# Patient Record
Sex: FEMALE | Race: WHITE | Employment: FULL TIME | URBAN - METROPOLITAN AREA
[De-identification: names, ages, dates, MRNs, and addresses within clinical notes are randomized per-mention and may not be internally consistent; named-entity substitution may affect disease eponyms.]

---

## 2017-02-07 ENCOUNTER — OFFICE VISIT (OUTPATIENT)
Dept: INTERNAL MEDICINE CLINIC | Age: 47
End: 2017-02-07

## 2017-02-07 VITALS
OXYGEN SATURATION: 98 % | DIASTOLIC BLOOD PRESSURE: 90 MMHG | SYSTOLIC BLOOD PRESSURE: 124 MMHG | RESPIRATION RATE: 16 BRPM | HEIGHT: 70 IN | HEART RATE: 76 BPM | WEIGHT: 252.6 LBS | BODY MASS INDEX: 36.16 KG/M2 | TEMPERATURE: 99.4 F

## 2017-02-07 DIAGNOSIS — J01.11 ACUTE RECURRENT FRONTAL SINUSITIS: Primary | ICD-10-CM

## 2017-02-07 PROCEDURE — 99214 OFFICE O/P EST MOD 30 MIN: CPT | Performed by: NURSE PRACTITIONER

## 2017-02-07 RX ORDER — AMOXICILLIN AND CLAVULANATE POTASSIUM 875; 125 MG/1; MG/1
1 TABLET, FILM COATED ORAL 2 TIMES DAILY
Qty: 20 TABLET | Refills: 0 | Status: SHIPPED | OUTPATIENT
Start: 2017-02-07 | End: 2017-02-17

## 2017-02-07 ASSESSMENT — ENCOUNTER SYMPTOMS
SHORTNESS OF BREATH: 0
COUGH: 1
SORE THROAT: 0
SINUS PRESSURE: 1

## 2017-02-10 ENCOUNTER — TELEPHONE (OUTPATIENT)
Dept: INTERNAL MEDICINE CLINIC | Age: 47
End: 2017-02-10

## 2017-02-10 RX ORDER — AMOXICILLIN AND CLAVULANATE POTASSIUM 875; 125 MG/1; MG/1
1 TABLET, FILM COATED ORAL 2 TIMES DAILY
Qty: 20 TABLET | Refills: 0 | Status: CANCELLED | OUTPATIENT
Start: 2017-02-10 | End: 2017-02-20

## 2017-02-10 RX ORDER — BENZONATATE 100 MG/1
100 CAPSULE ORAL 3 TIMES DAILY PRN
Qty: 30 CAPSULE | Refills: 1 | Status: SHIPPED | OUTPATIENT
Start: 2017-02-10 | End: 2017-02-17

## 2017-09-19 ENCOUNTER — OFFICE VISIT (OUTPATIENT)
Dept: INTERNAL MEDICINE CLINIC | Age: 47
End: 2017-09-19

## 2017-09-19 VITALS
HEIGHT: 70 IN | OXYGEN SATURATION: 97 % | TEMPERATURE: 99.5 F | WEIGHT: 249.2 LBS | SYSTOLIC BLOOD PRESSURE: 112 MMHG | HEART RATE: 86 BPM | DIASTOLIC BLOOD PRESSURE: 82 MMHG | BODY MASS INDEX: 35.68 KG/M2

## 2017-09-19 DIAGNOSIS — M79.604 RIGHT LEG PAIN: Primary | ICD-10-CM

## 2017-09-19 LAB — D DIMER: 265 NG/ML DDU (ref 0–229)

## 2017-09-19 PROCEDURE — 99213 OFFICE O/P EST LOW 20 MIN: CPT | Performed by: INTERNAL MEDICINE

## 2017-09-20 ENCOUNTER — HOSPITAL ENCOUNTER (OUTPATIENT)
Dept: VASCULAR LAB | Age: 47
Discharge: HOME | End: 2017-09-20
Attending: INTERNAL MEDICINE | Admitting: INTERNAL MEDICINE

## 2017-09-20 DIAGNOSIS — M79.604 PAIN OF RIGHT LEG: ICD-10-CM

## 2017-09-22 ENCOUNTER — TELEPHONE (OUTPATIENT)
Dept: INTERNAL MEDICINE CLINIC | Age: 47
End: 2017-09-22

## 2017-12-18 ENCOUNTER — OFFICE VISIT (OUTPATIENT)
Dept: INTERNAL MEDICINE CLINIC | Age: 47
End: 2017-12-18

## 2017-12-18 VITALS
BODY MASS INDEX: 36.22 KG/M2 | DIASTOLIC BLOOD PRESSURE: 82 MMHG | OXYGEN SATURATION: 98 % | SYSTOLIC BLOOD PRESSURE: 132 MMHG | HEIGHT: 70 IN | WEIGHT: 253 LBS | TEMPERATURE: 98 F | HEART RATE: 89 BPM

## 2017-12-18 DIAGNOSIS — M17.11 ARTHRITIS OF KNEE, RIGHT: ICD-10-CM

## 2017-12-18 DIAGNOSIS — Z00.00 ANNUAL PHYSICAL EXAM: Primary | ICD-10-CM

## 2017-12-18 DIAGNOSIS — Z23 NEED FOR PNEUMOCOCCAL VACCINATION: ICD-10-CM

## 2017-12-18 DIAGNOSIS — M17.12 ARTHRITIS OF KNEE, LEFT: ICD-10-CM

## 2017-12-18 PROCEDURE — 90732 PPSV23 VACC 2 YRS+ SUBQ/IM: CPT | Performed by: INTERNAL MEDICINE

## 2017-12-18 PROCEDURE — 99396 PREV VISIT EST AGE 40-64: CPT | Performed by: INTERNAL MEDICINE

## 2017-12-18 PROCEDURE — 90471 IMMUNIZATION ADMIN: CPT | Performed by: INTERNAL MEDICINE

## 2017-12-18 NOTE — PROGRESS NOTES
Subjective:      Patient ID: Suzanna Tafoya is a 50 y.o. female. HPIwith a history of OA, HTN and breast ca who is here for an annual visit. She walks a few times per week. She also has to get up and down 4 flights of steps several x per day. Has gained a little. No Known Allergies  Current Outpatient Prescriptions   Medication Sig Dispense Refill    lisinopril-hydrochlorothiazide (PRINZIDE;ZESTORETIC) 10-12.5 MG per tablet TAKE 1 TABLET BY MOUTH DAILY 90 tablet 0    atorvastatin (LIPITOR) 20 MG tablet Take 1 tablet by mouth daily Take at bedtime 30 tablet 3    levonorgestrel (MIRENA) 20 MCG/24HR IUD 1 each by Intrauterine route once. No current facility-administered medications for this visit. Past Medical History:   Diagnosis Date    Arthritis of knee, left     followed by Magali Lebron    Arthritis of knee, right     Benign essential HTN     Combined hyperlipidemia     History of left breast cancer 2010    radiation and tamoxifen : [facility]      Past Surgical History:   Procedure Laterality Date    BREAST LUMPECTOMY Right Nov 09    KNEE SURGERY      reconstruction and 1 knee acl tear    SHANNAN AND BSO  07/07/2017    for fibroids      Social History   Substance Use Topics    Smoking status: Current Every Day Smoker     Packs/day: 0.30    Smokeless tobacco: Never Used    Alcohol use Yes      Comment: socially     Family History   Problem Relation Age of Onset    Diabetes Other     Hypertension Other           Review of Systems   Musculoskeletal: Positive for joint swelling. All other systems reviewed and are negative. Objective:   Physical Exam   Constitutional: She is oriented to person, place, and time. She appears well-developed and well-nourished. No distress. HENT:   Head: Normocephalic and atraumatic.    Right Ear: External ear normal.   Left Ear: External ear normal.   Nose: Nose normal.   Mouth/Throat: Oropharynx is clear and moist.   Eyes: EOM are normal. Pupils are equal, round, and reactive to light. Right eye exhibits no discharge. Left eye exhibits no discharge. Neck: Normal range of motion. Neck supple. No JVD present. No tracheal deviation present. No thyromegaly present. Cardiovascular: Normal rate, regular rhythm and normal heart sounds. Exam reveals no gallop. No murmur heard. Pulmonary/Chest: Effort normal and breath sounds normal. No stridor. No respiratory distress. Abdominal: Soft. Bowel sounds are normal. She exhibits no distension. There is no tenderness. Musculoskeletal: Normal range of motion. She exhibits no edema. Lymphadenopathy:     She has no cervical adenopathy. Neurological: She is alert and oriented to person, place, and time. She has normal reflexes. Skin: Skin is warm and dry. She is not diaphoretic. Psychiatric: She has a normal mood and affect. Her behavior is normal. Judgment and thought content normal.   Nursing note and vitals reviewed. Assessment:      1. HTN controlled,  Controlled cont same  2. MO encourage exercise, [employer]  3. Breast cancer in remission  4.  Smoker discussed hoping for resolution      Plan:

## 2018-01-29 RX ORDER — NALTREXONE HYDROCHLORIDE AND BUPROPION HYDROCHLORIDE 8; 90 MG/1; MG/1
TABLET, EXTENDED RELEASE ORAL
Qty: 70 TABLET | Refills: 0 | Status: SHIPPED | OUTPATIENT
Start: 2018-01-29 | End: 2018-03-22 | Stop reason: SDUPTHER

## 2018-02-08 RX ORDER — LISINOPRIL AND HYDROCHLOROTHIAZIDE 12.5; 1 MG/1; MG/1
TABLET ORAL
Qty: 90 TABLET | Refills: 0 | Status: SHIPPED | OUTPATIENT
Start: 2018-02-08 | End: 2018-12-13 | Stop reason: SDUPTHER

## 2018-03-22 RX ORDER — NALTREXONE HYDROCHLORIDE AND BUPROPION HYDROCHLORIDE 8; 90 MG/1; MG/1
TABLET, EXTENDED RELEASE ORAL
Qty: 70 TABLET | Refills: 0 | Status: SHIPPED | OUTPATIENT
Start: 2018-03-22 | End: 2018-04-03 | Stop reason: SDUPTHER

## 2018-04-02 ENCOUNTER — TELEPHONE (OUTPATIENT)
Dept: INTERNAL MEDICINE CLINIC | Age: 48
End: 2018-04-02

## 2018-04-03 ENCOUNTER — TELEPHONE (OUTPATIENT)
Dept: INTERNAL MEDICINE CLINIC | Age: 48
End: 2018-04-03

## 2019-09-17 PROBLEM — Z87.891 FORMER SMOKER: Status: ACTIVE | Noted: 2019-03-01

## 2019-10-25 ENCOUNTER — HOSPITAL ENCOUNTER (OUTPATIENT)
Dept: WOMENS IMAGING | Age: 49
Discharge: HOME | End: 2019-10-25
Payer: COMMERCIAL

## 2019-10-25 DIAGNOSIS — Z12.31 VISIT FOR SCREENING MAMMOGRAM: ICD-10-CM

## 2019-10-25 PROCEDURE — 77063 BREAST TOMOSYNTHESIS BI: CPT

## 2020-09-21 ENCOUNTER — OFFICE VISIT (OUTPATIENT)
Dept: ORTHOPEDIC SURGERY | Age: 50
End: 2020-09-21
Payer: COMMERCIAL

## 2020-09-21 VITALS — TEMPERATURE: 98 F | BODY MASS INDEX: 37.03 KG/M2 | HEIGHT: 69 IN | WEIGHT: 250 LBS | RESPIRATION RATE: 16 BRPM

## 2020-09-21 PROBLEM — G56.22 CUBITAL TUNNEL SYNDROME, LEFT: Status: ACTIVE | Noted: 2020-09-21

## 2020-09-21 PROCEDURE — 99203 OFFICE O/P NEW LOW 30 MIN: CPT | Performed by: ORTHOPAEDIC SURGERY

## 2020-09-21 NOTE — PROGRESS NOTES
This 44 y.o. right hand dominant  is seen today with a chief complaint of numbness and tingling of the ring and little fingers of the left hand. Symptoms have been present for 2-3 weeks and will frequently radiate proximally to the ulnar aspect of the hand, but not to the forearm or elbow. The patient will occasionally notice weakness and loss of dexterity. Symptoms have persisted but not becoming worse   There is no history of significant injury or history of cervical disc disease. There is not a history of similar symptoms in the opposite upper extremity. There is a positive family history of diabetes(father) and carpal tunnel syndrome(mother)The pain assessment has been reviewed and is correct as stated. The patient's social history, past medical history, family history, medications, allergies and review of systems, entered 9/21/20, have been reviewed, and dated and are recorded in the chart. On physical examination the patient is Height: 5' 9\" (175.3 cm) tall and weighs Weight: 250 lb (113.4 kg). Respirations are 18 per minute. The patient is well nourished, is oriented to time and place, demonstrates appropriate mood and affect as well as normal gait and station. There is absent bilateral intrinsic muscle atrophy . There is no swelling or discoloration. There is no clawing of the little finger. Tinel's sign is positive over the ulnar nerve at the left elbow. The elbow flexion test is positive. There is decreased sensation in the ulnar nerve distribution of the left hand. There is moderate weakness of the abductor digiti minimi and 1st dorsal interosseous muscles on the left only. Range of motion of the fingers, thumbs, wrists, forearms and elbows is full and painless. Skin is intact as is distal circulation bilaterally. All joints are stable. Deep tendon reflexes are present bilaterally. There are no enlarged epitrochlear lymph nodes.     Xrays:  AP and lateral Xrays of the left elbow done in the office today, demonstrate no significant underlying bone or joint disease. Impression: Compression neuropathy ulnar nerve left elbow. The nature of this medical problem is fully discussed with the patient, including all treatment options. All questions are answered. It is too soon to order an EMG. She is instructed to avoid all postures and activities that might increase pressure on the nerve. The patient is given a return appointment for 3 weeks for follow up examination and is instructed to call sooner if there are any questions or problems. That appointment may be cancelled only if the patient has regained full resolution of symptoms and return of normal function of their hand by that time.

## 2020-10-12 ENCOUNTER — TELEPHONE (OUTPATIENT)
Dept: ORTHOPEDIC SURGERY | Age: 50
End: 2020-10-12

## 2020-10-12 ENCOUNTER — OFFICE VISIT (OUTPATIENT)
Dept: ORTHOPEDIC SURGERY | Age: 50
End: 2020-10-12
Payer: COMMERCIAL

## 2020-10-12 VITALS — WEIGHT: 250 LBS | TEMPERATURE: 97.8 F | HEIGHT: 69 IN | BODY MASS INDEX: 37.03 KG/M2

## 2020-10-12 PROCEDURE — 99212 OFFICE O/P EST SF 10 MIN: CPT | Performed by: ORTHOPAEDIC SURGERY

## 2020-10-12 NOTE — PROGRESS NOTES
Patient returns to the office for evaluation of   Chief Complaint   Patient presents with    Follow-up     Compression neuropathy ulnar nerve left elbow. The patient relates no improvement in her symptoms. The patient's social history, past medical history, family history, medications, allergies and review of systems have been reviewed, dated 9/21/20 and are recorded in the chart. On physical examination the patient is Height: 5' 9\" (175.3 cm) tall and weighs Weight: 250 lb (113.4 kg). Respirations are 18 per minute. The patient is well nourished, is oriented to time and place, demonstrates appropriate mood and affect as well as normal gait and station. There is mild left intrinsic muscle atrophy . There is no swelling or discoloration. There is no clawing of the little finger. Tinel's sign is positive over the ulnar nerve at the left elbow. The elbow flexion test is positive. There is decreased sensation and dysesthesia in the ulnar nerve distribution of the left hand. There is moderate weakness of the abductor digiti minimi and 1st dorsal interosseous muscles. Range of motion of the fingers, thumbs, wrists, forearms and elbows is full and painless. Skin is intact as is distal circulation bilaterally. All joints are stable. Deep tendon reflexes are present bilaterally. There are no enlarged epitrochlear lymph nodes. Impression: Compression neuropathy ulnar nerve left elbow, without improvement. .    An EMG/NCS will be ordered and I will call the patient to discuss the results, when available.

## 2020-10-22 ENCOUNTER — TELEPHONE (OUTPATIENT)
Dept: ORTHOPEDIC SURGERY | Age: 50
End: 2020-10-22

## 2020-10-22 NOTE — LETTER
[facility]  Surgery Scheduling Form:      DEMOGRAPHICS:                                                                                                                Patient Name:  Ilene Olivares  Patient :  1970   Patient SS#:  [id number]    Patient Phone:  [phone] (home) [phone] (work)   Patient Address:  [address] [place] [address]    PCP:  Maylin Chilel MD  Insurance:    Payor/Plan Subscr  Sex Relation Sub. Ins. ID Effective Group Num   1. [id number] [facility] 1970 Female  [id number]                                   [address]     DIAGNOSIS & PROCEDURE:                                                                                              Diagnosis:   left Cubital Tunnel Syndrome / Ulnar Nerve Entrapment at Elbow (354.2) and  left Carpal Tunnel Syndrome    G56.02  Operation:  left  Ulnar Nerve Decompression  (at Elbow) and  left Carpal Tunnel Release  63004 and [id number]  Location:  [facility] [place]  Surgeon:  Juni Mcqueen    SCHEDULING INFORMATION:                                                                                         .  Surgeon's Scheduling Instruction:  elective    Requested Date:    OR Time:   11:45 Patient Arrival Time:  10:15    OR Time Required:  30  Minutes  Anesthesia:  General  Equipment:  None                                       Covid:   11-13   Mini C-Arm:  No   Standard C-Arm:  No  Status:  outpatient  PAT Required:  Yes  Comments:                      Geoff Foster.  Tara Mancuso MD  10/22/20     BILLING INFORMATION:                                                                                                     Procedure:       CPT Code Modifier

## 2020-10-22 NOTE — TELEPHONE ENCOUNTER
The patient is advised regarding the nature of their medical problem and the results of the EMG test.  All treatment options are fully discussed, including surgery, risks, complications, prognosis and postoperative care. All questions are answered. Surgery will be scheduled at a time convenient to the patient. They are asked to call me if they have any additional questions. The patient understands that the surgery will be done by Dr. Lynder Mcburney.

## 2020-10-28 ENCOUNTER — TELEPHONE (OUTPATIENT)
Dept: ORTHOPEDIC SURGERY | Age: 50
End: 2020-10-28

## 2020-11-13 ENCOUNTER — OFFICE VISIT (OUTPATIENT)
Dept: PRIMARY CARE CLINIC | Age: 50
End: 2020-11-13
Payer: COMMERCIAL

## 2020-11-13 PROCEDURE — 99211 OFF/OP EST MAY X REQ PHY/QHP: CPT | Performed by: NURSE PRACTITIONER

## 2020-11-16 LAB — SARS-COV-2: NOT DETECTED

## 2020-11-17 RX ORDER — NAPROXEN 250 MG/1
250 TABLET ORAL DAILY PRN
COMMUNITY

## 2020-11-17 NOTE — PROGRESS NOTES

## 2020-11-17 NOTE — PROGRESS NOTES
Phone message left to call [facility] dept at [phone]  for history review and surgery instructions on 11/17/20 @ 5676

## 2020-11-17 NOTE — PROGRESS NOTES
C-Difficile admission screening and protocol:     * Admitted with diarrhea? YES____    NO_X____     *Prior history of C-Diff. In last 3 months? YES____   NO_X____     *Antibiotic use in the past 6-8 weeks? NO___X___YES______                 If yes which  ANTIBIOTIC AND REASON______     *Prior hospitalization or nursing home in the last month?  YES____   NO__X__

## 2020-11-18 ENCOUNTER — HOSPITAL ENCOUNTER (OUTPATIENT)
Dept: WOMENS IMAGING | Age: 50
Discharge: HOME | End: 2020-11-18
Payer: COMMERCIAL

## 2020-11-18 ENCOUNTER — ANESTHESIA EVENT (OUTPATIENT)
Dept: OPERATING ROOM | Age: 50
End: 2020-11-18
Payer: COMMERCIAL

## 2020-11-18 PROCEDURE — 77063 BREAST TOMOSYNTHESIS BI: CPT

## 2020-11-19 ENCOUNTER — ANESTHESIA (OUTPATIENT)
Dept: OPERATING ROOM | Age: 50
End: 2020-11-19
Payer: COMMERCIAL

## 2020-11-19 ENCOUNTER — HOSPITAL ENCOUNTER (OUTPATIENT)
Age: 50
Setting detail: OUTPATIENT SURGERY
Discharge: HOME | End: 2020-11-19
Attending: ORTHOPAEDIC SURGERY | Admitting: ORTHOPAEDIC SURGERY
Payer: COMMERCIAL

## 2020-11-19 VITALS
TEMPERATURE: 96.9 F | HEART RATE: 81 BPM | HEIGHT: 70 IN | WEIGHT: 284 LBS | SYSTOLIC BLOOD PRESSURE: 127 MMHG | OXYGEN SATURATION: 95 % | BODY MASS INDEX: 40.66 KG/M2 | RESPIRATION RATE: 16 BRPM | DIASTOLIC BLOOD PRESSURE: 91 MMHG

## 2020-11-19 VITALS
TEMPERATURE: 96.8 F | SYSTOLIC BLOOD PRESSURE: 107 MMHG | OXYGEN SATURATION: 94 % | DIASTOLIC BLOOD PRESSURE: 61 MMHG | RESPIRATION RATE: 13 BRPM

## 2020-11-19 PROCEDURE — 2500000003 HC RX 250 WO HCPCS: Performed by: NURSE ANESTHETIST, CERTIFIED REGISTERED

## 2020-11-19 PROCEDURE — 6360000002 HC RX W HCPCS: Performed by: NURSE ANESTHETIST, CERTIFIED REGISTERED

## 2020-11-19 PROCEDURE — 2580000003 HC RX 258: Performed by: ANESTHESIOLOGY

## 2020-11-19 PROCEDURE — 2500000003 HC RX 250 WO HCPCS: Performed by: ORTHOPAEDIC SURGERY

## 2020-11-19 PROCEDURE — 7100000000 HC PACU RECOVERY - FIRST 15 MIN: Performed by: ORTHOPAEDIC SURGERY

## 2020-11-19 PROCEDURE — 2709999900 HC NON-CHARGEABLE SUPPLY: Performed by: ORTHOPAEDIC SURGERY

## 2020-11-19 PROCEDURE — 3700000000 HC ANESTHESIA ATTENDED CARE: Performed by: ORTHOPAEDIC SURGERY

## 2020-11-19 PROCEDURE — 7100000010 HC PHASE II RECOVERY - FIRST 15 MIN: Performed by: ORTHOPAEDIC SURGERY

## 2020-11-19 PROCEDURE — 7100000011 HC PHASE II RECOVERY - ADDTL 15 MIN: Performed by: ORTHOPAEDIC SURGERY

## 2020-11-19 PROCEDURE — 3600000005 HC SURGERY LEVEL 5 BASE: Performed by: ORTHOPAEDIC SURGERY

## 2020-11-19 PROCEDURE — 3700000001 HC ADD 15 MINUTES (ANESTHESIA): Performed by: ORTHOPAEDIC SURGERY

## 2020-11-19 PROCEDURE — 7100000001 HC PACU RECOVERY - ADDTL 15 MIN: Performed by: ORTHOPAEDIC SURGERY

## 2020-11-19 PROCEDURE — 3600000015 HC SURGERY LEVEL 5 ADDTL 15MIN: Performed by: ORTHOPAEDIC SURGERY

## 2020-11-19 RX ORDER — SODIUM CHLORIDE 0.9 % (FLUSH) 0.9 %
10 SYRINGE (ML) INJECTION EVERY 12 HOURS SCHEDULED
Status: DISCONTINUED | OUTPATIENT
Start: 2020-11-19 | End: 2020-11-19 | Stop reason: HOSPADM

## 2020-11-19 RX ORDER — FENTANYL CITRATE 50 UG/ML
25 INJECTION, SOLUTION INTRAMUSCULAR; INTRAVENOUS EVERY 5 MIN PRN
Status: DISCONTINUED | OUTPATIENT
Start: 2020-11-19 | End: 2020-11-19 | Stop reason: HOSPADM

## 2020-11-19 RX ORDER — SODIUM CHLORIDE 0.9 % (FLUSH) 0.9 %
10 SYRINGE (ML) INJECTION PRN
Status: DISCONTINUED | OUTPATIENT
Start: 2020-11-19 | End: 2020-11-19 | Stop reason: HOSPADM

## 2020-11-19 RX ORDER — PROMETHAZINE HYDROCHLORIDE 25 MG/ML
6.25 INJECTION, SOLUTION INTRAMUSCULAR; INTRAVENOUS
Status: DISCONTINUED | OUTPATIENT
Start: 2020-11-19 | End: 2020-11-19 | Stop reason: HOSPADM

## 2020-11-19 RX ORDER — FENTANYL CITRATE 50 UG/ML
INJECTION, SOLUTION INTRAMUSCULAR; INTRAVENOUS PRN
Status: DISCONTINUED | OUTPATIENT
Start: 2020-11-19 | End: 2020-11-19 | Stop reason: SDUPTHER

## 2020-11-19 RX ORDER — LIDOCAINE HYDROCHLORIDE 20 MG/ML
INJECTION, SOLUTION EPIDURAL; INFILTRATION; INTRACAUDAL; PERINEURAL PRN
Status: DISCONTINUED | OUTPATIENT
Start: 2020-11-19 | End: 2020-11-19 | Stop reason: SDUPTHER

## 2020-11-19 RX ORDER — PROPOFOL 10 MG/ML
INJECTION, EMULSION INTRAVENOUS PRN
Status: DISCONTINUED | OUTPATIENT
Start: 2020-11-19 | End: 2020-11-19 | Stop reason: SDUPTHER

## 2020-11-19 RX ORDER — SODIUM CHLORIDE 9 MG/ML
INJECTION, SOLUTION INTRAVENOUS CONTINUOUS
Status: DISCONTINUED | OUTPATIENT
Start: 2020-11-19 | End: 2020-11-19 | Stop reason: HOSPADM

## 2020-11-19 RX ORDER — LABETALOL HYDROCHLORIDE 5 MG/ML
5 INJECTION, SOLUTION INTRAVENOUS EVERY 10 MIN PRN
Status: DISCONTINUED | OUTPATIENT
Start: 2020-11-19 | End: 2020-11-19 | Stop reason: HOSPADM

## 2020-11-19 RX ORDER — MIDAZOLAM HYDROCHLORIDE 1 MG/ML
INJECTION INTRAMUSCULAR; INTRAVENOUS PRN
Status: DISCONTINUED | OUTPATIENT
Start: 2020-11-19 | End: 2020-11-19 | Stop reason: SDUPTHER

## 2020-11-19 RX ORDER — BUPIVACAINE HYDROCHLORIDE 5 MG/ML
INJECTION, SOLUTION EPIDURAL; INTRACAUDAL
Status: COMPLETED | OUTPATIENT
Start: 2020-11-19 | End: 2020-11-19

## 2020-11-19 RX ORDER — DEXAMETHASONE SODIUM PHOSPHATE 4 MG/ML
INJECTION, SOLUTION INTRA-ARTICULAR; INTRALESIONAL; INTRAMUSCULAR; INTRAVENOUS; SOFT TISSUE PRN
Status: DISCONTINUED | OUTPATIENT
Start: 2020-11-19 | End: 2020-11-19 | Stop reason: SDUPTHER

## 2020-11-19 RX ORDER — ONDANSETRON 2 MG/ML
INJECTION INTRAMUSCULAR; INTRAVENOUS PRN
Status: DISCONTINUED | OUTPATIENT
Start: 2020-11-19 | End: 2020-11-19 | Stop reason: SDUPTHER

## 2020-11-19 RX ADMIN — ONDANSETRON 4 MG: 2 INJECTION INTRAMUSCULAR; INTRAVENOUS at 11:09

## 2020-11-19 RX ADMIN — SODIUM CHLORIDE: 9 INJECTION, SOLUTION INTRAVENOUS at 10:12

## 2020-11-19 RX ADMIN — MIDAZOLAM 2 MG: 1 INJECTION INTRAMUSCULAR; INTRAVENOUS at 11:00

## 2020-11-19 RX ADMIN — DEXAMETHASONE SODIUM PHOSPHATE 8 MG: 4 INJECTION, SOLUTION INTRAMUSCULAR; INTRAVENOUS at 11:09

## 2020-11-19 RX ADMIN — FENTANYL CITRATE 50 MCG: 50 INJECTION INTRAMUSCULAR; INTRAVENOUS at 11:03

## 2020-11-19 RX ADMIN — PROPOFOL 250 MG: 10 INJECTION, EMULSION INTRAVENOUS at 11:03

## 2020-11-19 RX ADMIN — LIDOCAINE HYDROCHLORIDE 80 MG: 20 INJECTION, SOLUTION EPIDURAL; INFILTRATION; INTRACAUDAL; PERINEURAL at 11:03

## 2020-11-19 RX ADMIN — FENTANYL CITRATE 50 MCG: 50 INJECTION INTRAMUSCULAR; INTRAVENOUS at 11:08

## 2020-11-19 RX ADMIN — FENTANYL CITRATE 50 MCG: 50 INJECTION INTRAMUSCULAR; INTRAVENOUS at 11:15

## 2020-11-19 ASSESSMENT — PULMONARY FUNCTION TESTS
PIF_VALUE: 5
PIF_VALUE: 15
PIF_VALUE: 1
PIF_VALUE: 6
PIF_VALUE: 0
PIF_VALUE: 2
PIF_VALUE: 13
PIF_VALUE: 16
PIF_VALUE: 14
PIF_VALUE: 3
PIF_VALUE: 8
PIF_VALUE: 5
PIF_VALUE: 6
PIF_VALUE: 5
PIF_VALUE: 4
PIF_VALUE: 0
PIF_VALUE: 8
PIF_VALUE: 18
PIF_VALUE: 4
PIF_VALUE: 5
PIF_VALUE: 5
PIF_VALUE: 6
PIF_VALUE: 5
PIF_VALUE: 16
PIF_VALUE: 15

## 2020-11-19 ASSESSMENT — PAIN DESCRIPTION - LOCATION
LOCATION: HAND

## 2020-11-19 ASSESSMENT — PAIN DESCRIPTION - PAIN TYPE
TYPE: SURGICAL PAIN

## 2020-11-19 ASSESSMENT — PAIN DESCRIPTION - ONSET
ONSET: ON-GOING

## 2020-11-19 ASSESSMENT — PAIN DESCRIPTION - FREQUENCY
FREQUENCY: CONTINUOUS

## 2020-11-19 ASSESSMENT — PAIN DESCRIPTION - ORIENTATION
ORIENTATION: LEFT

## 2020-11-19 ASSESSMENT — PAIN DESCRIPTION - PROGRESSION
CLINICAL_PROGRESSION: NOT CHANGED

## 2020-11-19 ASSESSMENT — PAIN SCALES - GENERAL
PAINLEVEL_OUTOF10: 5
PAINLEVEL_OUTOF10: 4
PAINLEVEL_OUTOF10: 5

## 2020-11-19 ASSESSMENT — PAIN - FUNCTIONAL ASSESSMENT
PAIN_FUNCTIONAL_ASSESSMENT: PREVENTS OR INTERFERES SOME ACTIVE ACTIVITIES AND ADLS
PAIN_FUNCTIONAL_ASSESSMENT: PREVENTS OR INTERFERES WITH ALL ACTIVE AND SOME PASSIVE ACTIVITIES
PAIN_FUNCTIONAL_ASSESSMENT: PREVENTS OR INTERFERES SOME ACTIVE ACTIVITIES AND ADLS
PAIN_FUNCTIONAL_ASSESSMENT: 0-10

## 2020-11-19 ASSESSMENT — PAIN DESCRIPTION - DESCRIPTORS
DESCRIPTORS: DISCOMFORT

## 2020-11-19 NOTE — ANESTHESIA PRE PROCEDURE
[facility] Department of Anesthesiology  Pre-Anesthesia Evaluation/Consultation       Name:  Luz Cadte  : 1970  Age:  44 y.o. MRN:  [id number]  Date: 2020           Surgeon: Surgeon(s):  Maynor Flores MD    Procedure: Procedure(s):  LEFT ULNAR NERVE DECOMPRESSION (AT ELBOW) AND LEFT CARPAL TUNNEL RELEASE     No Known Allergies  Patient Active Problem List   Diagnosis    Benign essential HTN    Combined hyperlipidemia    Arthritis of knee, right    Former smoker    Morbid obesity with BMI of 40.0-44.9, adult ([address]    Cubital tunnel syndrome on left     Past Medical History:   Diagnosis Date    Arthritis of knee, right     Benign essential HTN     [facility] CA [address]     right--breast---radiation and tamoxifen    Combined hyperlipidemia     Morbid obesity with BMI of 40.0-44.9, adult ([facility]     Wears glasses      Past Surgical History:   Procedure Laterality Date    BREAST LUMPECTOMY Right     HYSTERECTOMY  2017    SHANNAN AND BSO (CERVIX REMOVED)    JOINT REPLACEMENT Left 2019    total knee replacement    KNEE SURGERY Left     reconstruction and 1 knee acl tear x 6     Social History     Tobacco Use    Smoking status: Former Smoker     Packs/day: 0.30     Years: 30.00     Pack years: 9.00     Types: Cigarettes     Start date: 1982     Last attempt to quit: 3/27/2019     Years since quittin.6    Smokeless tobacco: Never Used    Tobacco comment: was up to 2ppd for 4 years slowly down   Substance Use Topics    Alcohol use: Yes     Comment: socially    Drug use: Never     Medications  No current facility-administered medications on file prior to encounter.       Current Outpatient Medications on File Prior to Encounter   Medication Sig Dispense Refill    naproxen (NAPROSYN) 250 MG tablet Take 250 mg by mouth daily as needed for Pain      atorvastatin (LIPITOR) 20 MG tablet Take 1 tablet by mouth daily 90 tablet 1 Current Facility-Administered Medications   Medication Dose Route Frequency Provider Last Rate Last Dose    0.9 % sodium chloride infusion   Intravenous Continuous Nallely Randall  mL/hr at 20 1012      sodium chloride flush 0.9 % injection 10 mL  10 mL Intravenous 2 times per day Nallely Randall MD        sodium chloride flush 0.9 % injection 10 mL  10 mL Intravenous PRN Nallely Randall MD         Vital Signs (Current)   Vitals:    20 1001   BP: (!) 142/85   Pulse: 93   Resp: 17   Temp: 96.9 °F (36.1 °C)   SpO2: 96%     Vital Signs Statistics (for past 48 hrs)     Temp  Av.9 °F (36.1 °C)  Min: 96.9 °F (36.1 °C)   Min taken time: 20 1001  Max: 96.9 °F (36.1 °C)   Max taken time: 20 1001  Pulse  Av  Min: 93   Min taken time: 20 1001  Max: 80   Max taken time: 20 1001  Resp  Av  Min: 16   Min taken time: 20 1001  Max: 16   Max taken time: 20 1001  BP  Min: 142/85   Min taken time: 20 1001  Max: 142/85   Max taken time: 20 1001  SpO2  Av %  Min: 96 %   Min taken time: 20 1001  Max: 96 %   Max taken time: 20 1001    BP Readings from Last 3 Encounters:   20 (!) 142/85   20 114/80   20 124/84     BMI  Body mass index is 41.34 kg/m². Estimated body mass index is 41.34 kg/m² as calculated from the following:    Height as of this encounter: 5' 9.5\" (1.765 m). Weight as of this encounter: 284 lb (128.8 kg).     CBC   Lab Results   Component Value Date    WBC 9.3 2020    RBC 4.91 2020    HGB 14.4 2020    HCT 43.6 2020    MCV 88.6 2020    RDW 14.7 2020     2020     CMP    Lab Results   Component Value Date     2020    K 4.6 2020    CL 96 2020    CO2 25 2020    BUN 10 2020    CREATININE 0.7 2020    GFRAA >60 2020    GFRAA >60 2010    AGRATIO 1.7 2020    LABGLOM >60 2020    GLUCOSE 98 2020 PROT 6.9 07/28/2020    PROT 6.9 09/08/2010    CALCIUM 9.6 07/28/2020    BILITOT 0.5 07/28/2020    ALKPHOS 98 07/28/2020    AST 21 07/28/2020    ALT 26 07/28/2020     BMP    Lab Results   Component Value Date     07/28/2020    K 4.6 07/28/2020    CL 96 07/28/2020    CO2 25 07/28/2020    BUN 10 07/28/2020    CREATININE 0.7 07/28/2020    CALCIUM 9.6 07/28/2020    GFRAA >60 07/28/2020    GFRAA >60 11/12/2010    LABGLOM >60 07/28/2020    GLUCOSE 98 07/28/2020     POCGlucose  No results for input(s): GLUCOSE in the last 72 hours. Coags    Lab Results   Component Value Date    PROTIME 12.7 03/14/2019    INR 0.9 43/09/5402     HCG (If Applicable) No results found for: PREGTESTUR, PREGSERUM, HCG, HCGQUANT   ABGs No results found for: PHART, PO2ART, DBV5NFR, CVL7VVI, BEART, H8EBRBUX   Type & Screen (If Applicable)  Lab Results   Component Value Date    LABABO B  POSITIVE   03/14/2019                            BMI: Wt Readings from Last 3 Encounters:       NPO Status:   Date of last liquid consumption: 11/18/20   Time of last liquid consumption: 2300   Date of last solid food consumption: 11/18/20      Time of last solid consumption: 2300       Anesthesia Evaluation  Patient summary reviewed no history of anesthetic complications:   Airway: Mallampati: III  TM distance: >3 FB   Neck ROM: full   Dental:          Pulmonary:Negative Pulmonary ROS and normal exam                               Cardiovascular:  Exercise tolerance: good (>4 METS),   (+) hypertension:,         Rhythm: regular  Rate: normal           Beta Blocker:  Not on Beta Blocker         Neuro/Psych:   (+) neuromuscular disease:,             GI/Hepatic/Renal:   (+) morbid obesity          Endo/Other:    (+) malignancy/cancer (Brst CA). Abdominal:   (+) obese,         Vascular: negative vascular ROS. Anesthesia Plan      general     ASA 3       Induction: intravenous.     MIPS: Postoperative opioids intended and Prophylactic antiemetics administered. Anesthetic plan and risks discussed with spouse and patient. Plan discussed with CRNA. This pre-anesthesia assessment may be used as a history and physical.    DOS STAFF ADDENDUM:    Pt seen and examined, chart reviewed (including anesthesia, drug and allergy history). No interval changes to history and physical examination. Anesthetic plan, risks, benefits, alternatives, and personnel involved discussed with patient. Questions and concerns addressed. Patient(family) verbalized an understanding and agrees to proceed.       Gavi Burrows MD  November 19, 2020  10:37 AM

## 2020-11-19 NOTE — PROGRESS NOTES
Pt arrived to PACU from OR. Pt sleeping on 2l/nc. Left arm with ace wrap C/D/I. Fingers warm. Cap refill WNL.

## 2020-11-19 NOTE — OP NOTE
allowed exposure of the ulnar nerve. The nerve was traced proximally and circumferential control of the nerve was obtained. It was dissected proximally to the level of the connection between the biceps and triceps muscles, approximately 3 cm proximal to the medial epicondyle. It was carefully mobilized from the medial intermuscular septum. It was traced distally, being completely freed along the course of the cubital tunnel, and was dissected distally to the level of the second motor branch as it split the heads of the FCU muscle. There was a tight fibrous band at the confluence of the heads of the FCU which was carefully divided. Digital palpation revealed that there were no further proximal or distal constriction about the nerve. The Ulnar Nerve was found to be stable in it's groove without tendency toward subluxation or snapping. Attention was turned to the palm. A 2 cm longitudinal incision was fashioned at the base of the palm, paralleling the longitudinal thenar crease. Dissection was carried carefully through the subcutaneous tissue identifying and protecting the neurovascular structures. The palmar fascia was incised longitudinally, exposing the transverse carpal ligament. The transverse carpal ligament was incised from its proximal to distal most extent, under direct visualization. The terminal 2 cm of antebrachial fascia was similarly incised under direct visualization. The contents of the carpal tunnel were inspected and found to be free of mass, lesion or other abnormality. Digital palpation revealed no further constriction about the median nerve. The incisions were all irrigated copiously with sterile saline for irrigation. The pneumo-tourniquet was deflated after a period of 6 minutes elevation & the fingers were immediately pink and well perfused. Hemostasis was easily obtained with direct pressure and electrocautery. The incisions were closed in layers with interrupted sutures.   The wounds were dressed with Adaptic & dry sterile dressings after local anesthetic was instilled for postoperative analgesia. Ms. Ilene Olivares was awakened from anesthesia having tolerated the procedure without apparent complication. She was returned to the recovery room in stable condition. At the conclusion of the procedure, all needle, instrument and sponge counts were correct. Dre Mancuso MD   11/19/2020, 11:21 AM

## 2020-11-19 NOTE — ANESTHESIA POSTPROCEDURE EVALUATION
[facility] Department of Anesthesiology  Post-Anesthesia Note       Name:  Mukul Tapia                                  Age:  44 y.o. MRN:  [id number]     Last Vitals & Oxygen Saturation: BP (!) 127/91   Pulse 81   Temp 96.9 °F (36.1 °C) (Oral)   Resp 16   Ht 5' 9.5\" (1.765 m)   Wt 284 lb (128.8 kg)   SpO2 95%   BMI 41.34 kg/m²   Patient Vitals for the past 4 hrs:   BP Temp Temp src Pulse Resp SpO2 Height Weight   11/19/20 1213 (!) 127/91 -- -- 81 16 95 % -- --   11/19/20 1154 123/76 96.9 °F (36.1 °C) Oral 84 12 94 % -- --   11/19/20 1146 123/88 -- -- 87 9 94 % -- --   11/19/20 1138 -- -- -- 91 13 95 % -- --   11/19/20 1136 119/86 -- -- 90 13 94 % -- --   11/19/20 1134 -- -- -- 91 12 96 % -- --   11/19/20 1129 (!) 137/90 99 °F (37.2 °C) Temporal 93 13 98 % -- --   11/19/20 1001 (!) 142/85 96.9 °F (36.1 °C) Temporal 93 17 96 % 5' 9.5\" (1.765 m) 284 lb (128.8 kg)       Level of consciousness:  Awake, alert    Respiratory: Respirations easy, no distress. Stable. Cardiovascular: Hemodynamically stable. Hydration: Adequate. PONV: Adequately managed. Post-op pain: Adequately controlled. Post-op assessment: Tolerated anesthetic well without complication. Complications:  None.     Yordy Alcaraz MD  November 19, 2020   12:18 PM

## 2020-11-23 ENCOUNTER — TELEPHONE (OUTPATIENT)
Dept: ORTHOPEDIC SURGERY | Age: 50
End: 2020-11-23

## 2020-11-23 NOTE — TELEPHONE ENCOUNTER
General Question     Subject: Patient requested a call advising she Is still having a lot of swelling  Patient : Polo Russo  Contact Number: [phone]

## 2020-11-30 ENCOUNTER — OFFICE VISIT (OUTPATIENT)
Dept: ORTHOPEDIC SURGERY | Age: 50
End: 2020-11-30

## 2020-11-30 VITALS — WEIGHT: 284 LBS | BODY MASS INDEX: 40.66 KG/M2 | RESPIRATION RATE: 16 BRPM | HEIGHT: 70 IN | TEMPERATURE: 97.4 F

## 2020-11-30 PROBLEM — G56.22 CUBITAL TUNNEL SYNDROME ON LEFT: Status: RESOLVED | Noted: 2020-09-21 | Resolved: 2020-11-30

## 2020-11-30 PROCEDURE — 99024 POSTOP FOLLOW-UP VISIT: CPT | Performed by: ORTHOPAEDIC SURGERY

## 2020-11-30 NOTE — Clinical Note
Dear  Loree Purcell MD,    Thank you very much for your referral or Ms. Gaines to me for evaluation and treatment of her Hand & Wrist condition. I appreciate your confidence in me and thank you for allowing me the opportunity to care for your patients. If I can be of any further assistance to you on this or any other patient, please do not hesitate to contact me. Sincerely,    Lalito Almonte.  Jeffy Benitez MD

## 2020-11-30 NOTE — PATIENT INSTRUCTIONS
Postoperative Instructions After Ulnar Nerve Decompression and Carpal Tunnel Release    Dr. Gita Rodriguez. Kiko        1. After bandages are removed one week from surgery, you may chose to wear a small bandage over the incision if you wish, though you do not need to. 2. Keep incision dry until sutures have fully dissolved  or it has been 14 days since your surgery. Thereafter, you may wash with mild soap and water and shower normally. 3. Once your stiches have fully disappeared & skin appears normal, you should begin gently massaging the incision with Vitamin E (may use Vitamin E lotion or contents of Vitamin E capsule). 4. Work hard on motion of the fingers and wrist, straightening each finger fully and bending each finger fully, bending wrist forward and bending wrist backwards. Do not be concerned if you experience discomfort. This will not damage the surgery. 5. You may begin using the hand as it feels comfortable beginning 12-14 days from the day of surgery. You may not feel entirely comfortable gripping or lifting heavy objects for several weeks. 6. You may expect to see some skin peel off around the incision. You may be left with a small area of pink baby skin. This is quite normal.    Thank you for choosing [facility]) Physicians for your Hand and Upper Extremity needs. If we can be of any further assistance to you, please do not hesitate to contact us.     Office Phone Number:  [phone]  or  [phone]

## 2020-12-09 PROBLEM — U07.1 COVID-19 VIRUS INFECTION: Status: ACTIVE | Noted: 2020-12-09

## 2021-03-05 ENCOUNTER — OFFICE VISIT (OUTPATIENT)
Dept: BARIATRICS/WEIGHT MGMT | Age: 51
End: 2021-03-05

## 2021-03-05 VITALS
DIASTOLIC BLOOD PRESSURE: 75 MMHG | HEIGHT: 69 IN | TEMPERATURE: 97.7 F | SYSTOLIC BLOOD PRESSURE: 130 MMHG | HEART RATE: 80 BPM | BODY MASS INDEX: 43.4 KG/M2 | WEIGHT: 293 LBS

## 2021-03-05 DIAGNOSIS — E66.01 MORBID OBESITY WITH BMI OF 40.0-44.9, ADULT (HCC): Primary | ICD-10-CM

## 2021-03-05 PROCEDURE — 99999 PR OFFICE/OUTPT VISIT,PROCEDURE ONLY: CPT

## 2021-03-05 NOTE — PROGRESS NOTES
Tru Link is a 44 y.o. female with a date of birth of 1970. Vitals:    03/05/21 0931   BP: 130/75   Pulse: 80   Temp: 97.7 °F (36.5 °C)   Weight: 296 lb (134.3 kg)   Height: 5' 8.5\" (1.74 m)    BMI: Body mass index is 44.35 kg/m². Obesity Classification: Class III    Weight History: Wt Readings from Last 3 Encounters:   01/04/21 292 lb 12.8 oz (132.8 kg)   11/30/20 284 lb (128.8 kg)   11/19/20 284 lb (128.8 kg)       Patient's lowest adult weight was 150 lb at age 22. Patient's highest adult weight was 296 lb at age 44. Patient has participated in the following weight loss programs: Atkins Diet, Weight Watcher Anonymous, and [place] [facility]. Patient has not participated in meal replacement/liquid diets. Patient has participated in weight loss medications - Adipex in summer of 2020 reports wt loss of 6# over 3 months and discontinued. Patient is not lactose intolerant. Patient does not have Baptist/cultural food concerns. Patient does not have food allergies. Pt reports regular sleep routine. Typically eating 3-4x day. 24 hour recall/food frequency chart:  Breakfast: rarely - Goetta with 1 over easy eggs  Snack: sometimes - mixed fresh fruit  Lunch: yes. - Thomas beef soup / salad with soup  Snack: sometimes - Small bag cheetos  Dinner: yes. - Beef bowls with ro rice (Every Plate Meal box 3 nights a week) / Dick Robe with meatballs / meatloaf with veggies / grilled protein with veggies and noodles or potatoes  Snack: occasionally - 1 scoop Graeter's cherry chip or something sweet  Drinks throughout the day: - coffee with creamer, water   Do you drink alcohol? yes. How often/how much alcohol do you drink: 3 beers a couple times a month. Exercise: weights to strengthen knee 2x week    Patient does not meet the criteria for binge eating disorder. Patient does have grazing. Patient does not have night eating.  Patient does have a history of emotional eating or eating out of boredom. Goals  Weight: 190-200#  Health Improvement: increased mobility and increased energy, decreased knee pain, improve HTN and HLD    Assessment  Nutritional Needs: RMR=(9.99 x 134.3) + (6.25 x 174) - (4.92 x 50 y.o.) -161  = 2012 kcal x 1.4 (light/sedentary activity factor)= 2817 kcal - 1000 (for 2 lb weight loss/week)= 1817 kcal.    Plan  Plan/Recommendations: General weight loss/lifestyle modification strategies discussed (elicit support from others; identify saboteurs; non-food rewards, etc). Optifast:  Is interested  Diet Medications:  Is interested    Handouts: 1500kcal LCMP, 9\" Plate, Protein Bars & Shakes    PES Statement:  Overweight/Obesity related to increased caloric intake and decreased physical activity as evidenced by BMI. Body mass index is 44.35 kg/m². Will follow up as necessary.     [address]

## 2021-03-13 ENCOUNTER — TELEMEDICINE (OUTPATIENT)
Dept: BARIATRICS/WEIGHT MGMT | Age: 51
End: 2021-03-13
Payer: COMMERCIAL

## 2021-03-13 DIAGNOSIS — E66.01 MORBID OBESITY WITH BMI OF 40.0-44.9, ADULT (HCC): Primary | ICD-10-CM

## 2021-03-13 DIAGNOSIS — Z71.3 DIETARY COUNSELING AND SURVEILLANCE: ICD-10-CM

## 2021-03-13 PROCEDURE — 99214 OFFICE O/P EST MOD 30 MIN: CPT | Performed by: FAMILY MEDICINE

## 2021-03-13 ASSESSMENT — ENCOUNTER SYMPTOMS
SHORTNESS OF BREATH: 0
BLOOD IN STOOL: 0
PHOTOPHOBIA: 0
VOMITING: 0
NAUSEA: 0
EYE PAIN: 0
ABDOMINAL DISTENTION: 0
COUGH: 0
CHEST TIGHTNESS: 0
DIARRHEA: 0
WHEEZING: 0
APNEA: 0
CHOKING: 0
CONSTIPATION: 0
ABDOMINAL PAIN: 0

## 2021-03-13 NOTE — PROGRESS NOTES
Patient: Andres Dry     Encounter Date: 3/13/2021    YOB: 1970               Age: 46 y.o. Patient identification was verified at the start of the visit. Patient-Reported Vitals 12/9/2020   Patient-Reported Weight 284.0lbs   Patient-Reported Height 5 9.5   Patient-Reported Temperature 97.8         BP Readings from Last 1 Encounters:   03/05/21 130/75       BMI Readings from Last 1 Encounters:   03/05/21 44.35 kg/m²       Pulse Readings from Last 1 Encounters:   03/05/21 80                                               Chief Complaint   Patient presents with    Bariatric, Initial Visit     [facility]       HPI:    46 y.o. female presents to [facility] care via video visit. The patient's medical history is significant for class III obesity. The patient has a long-standing history of obesity which started gradually. The problem is severe. The patient has been gaining weight. Risk factors include annual weight gain of >2 lbs (1 kg)/ year and sedentary lifestyle. Aggravating factors include poor diet and lack of physical activity. The patient has tried various diet/exercise plans which have been ineffective in the long-run. She is motivated to start losing weight to help imrpve her overall health. When did you become overweight? [] Childhood   [] Teens   [x] Adulthood   [] Pregnancy   [] Menopause    Highest adult weight: Current     Triggers for weight gain? [] Stress   [] Illness   [] Medications   [] Travel  []Injury     [] Nightshift work   [] Insomnia  [x] No specific triggers   [] Other    Food triggers:   [x] Stress   [x] Boredom   [x] Fast food   [x] Eating out   [] Seeking reward   [] Social     Have you ever taken medications to help you lose weight? [x] Yes- Adipex (poor response)   [] No    Have you ever been on a meal replacement program?  [] Yes  [x] No    The patient denies any significant cardiac or psychiatric disease. The patient denies a history thyroid disease.   The patient denies a history of glaucoma. The patient denies a history of seizure disorders/epiliepsy. Dietitian's assessment reviewed and addressed with patient. Reviewed:  [x] Nutrition and the importance of regular protein intake  [x] Hidden CHO/carbohydrate sources  [x] Alcohol use  [x] Tobacco use  [x] Drug use- Denies   [x] Importance of exercise and reducing sedentary time        Controlled Substance Monitoring:     No flowsheet data found.      No Known Allergies      Current Outpatient Medications:     naproxen (NAPROSYN) 250 MG tablet, Take 250 mg by mouth daily as needed for Pain, Disp: , Rfl:     lisinopril-hydroCHLOROthiazide (PRINZIDE;ZESTORETIC) 20-25 MG per tablet, TAKE 1 TABLET BY MOUTH ONE TIME A DAY, Disp: 90 tablet, Rfl: 1    Patient Active Problem List   Diagnosis    Benign essential HTN    Combined hyperlipidemia    Arthritis of knee, right    Former smoker    Morbid obesity with BMI of 40.0-44.9, adult ([facility]    COVID-19 virus infection       Past Medical History:   Diagnosis Date    Arthritis of knee, right     Benign essential HTN     Breast CA ([facility] 2009    right--breast---radiation and tamoxifen    Combined hyperlipidemia     Morbid obesity with BMI of 40.0-44.9, adult ([facility]     Morbid obesity with BMI of 40.0-44.9, adult ([facility]     Wears glasses        Past Surgical History:   Procedure Laterality Date    ARM SURGERY Left 11/19/2020    LEFT ULNAR NERVE DECOMPRESSION (AT ELBOW) AND LEFT CARPAL TUNNEL RELEASE performed by Qing Loyd MD at [place] [id number] LUMPECTOMY Right Nov 09    HYSTERECTOMY  07/07/2017    SHANNAN AND BSO (CERVIX REMOVED)    JOINT REPLACEMENT Left 03/2019    total knee replacement    KNEE SURGERY Left     reconstruction and 1 knee acl tear x 6    ULNAR TUNNEL RELEASE         Family History   Problem Relation Age of Onset    Diabetes Sister     Hypertension Sister     Arthritis Sister     Pacemaker Mother     High Blood Pressure Mother  Hypertension Mother     Elevated Lipids Mother     Arthritis Mother     Heart Disease Father     High Blood Pressure Father     Diabetes Father     Hypertension Father     Elevated Lipids Father     Coronary Art Dis Father     Arthritis Father     Hypertension Brother        Review of Systems   Constitutional: Negative for fatigue. Eyes: Negative for photophobia, pain and visual disturbance. Respiratory: Negative for apnea, cough, choking, chest tightness, shortness of breath and wheezing. Cardiovascular: Negative for chest pain, palpitations and leg swelling. Gastrointestinal: Negative for abdominal distention, abdominal pain, blood in stool, constipation, diarrhea, nausea and vomiting. Endocrine: Negative for cold intolerance and heat intolerance. Musculoskeletal: Negative for arthralgias and myalgias. Skin: Negative for rash. Neurological: Negative for dizziness, tremors, syncope, weakness, numbness and headaches. Psychiatric/Behavioral: Negative for agitation, confusion, decreased concentration, dysphoric mood, hallucinations, sleep disturbance and suicidal ideas. The patient is not nervous/anxious and is not hyperactive. Physical Exam  Constitutional:       Appearance: She is well-developed. HENT:      Head: Normocephalic. Eyes:      Conjunctiva/sclera: Conjunctivae normal.   Abdominal:      General: Abdomen is protuberant. Musculoskeletal:         General: No swelling. Neurological:      Mental Status: She is alert and oriented to person, place, and time. Psychiatric:         Mood and Affect: Mood normal.         Behavior: Behavior normal.         Thought Content: Thought content normal.         Judgment: Judgment normal.         Office Visit on 11/13/2020   Component Date Value Ref Range Status    SARS-CoV-2 11/13/2020 Not Detected  Not detected Final    Comment: This test has been authorized by FDA under an  Emergency Use Authorization (EUA).   This test is only authorized for the duration of the  time of declaration that circumstances exist justifying the  authorization of the emergency use of in vitro diagnostic  testing for detection of the SARS-CoV-2 virus  and/or diagnosis of COVID-19 infection under  section 564 (b)(1) of the Act, 21 U. S.C. 181OVU-2 (b) (1),  unless the authorization is terminated or revoked sooner. Patient Fact [employer]  Provider Fact [employer]    METHODOLOGY: RT PCR           Assessment and Plan:    1. Morbid obesity with BMI of 40.0-44.9, adult ([facility]    Heavily counseled on the importance of therapeutic lifestyle changes through diet and exercise. The patient understands that the goal of treatment is to reach and stay at a healthy weight. The initial treatment goal is to lose at least 5-10% of her body weight in 12 weeks. This will require changes in eating habits, increased physical activity, and behavior changes. Counseled on low carb/vernell diet. Patient handouts and education material  reviewed in detail with the patient. All questions answered. Encouraged patient to keep a food journal and to bring it to her next visit. Discussed available treatment options in addition to lifestyle changes including medications or OPTIFAST. She is interested in either option. May be a good candidate for Contrave or OPTIFAST depending on labs/EKG. she understands that medications/meal replacements are most effective as part of a comprehensive treatment plan that includes nutrition, physical activity, and behavior modification. The patient also understands that she will need close follow-ups every 2-4 weeks if she starts treatment. Depending on the patient's success with these changes, she may also be a good candidate for bariatric surgery down the line. Follow-up in 2 weeks to discuss lab results and treatment plan.  Patient advised that it is her responsibility to follow up on any ordered tests/lab results. Patient understands and agrees with the plan. - TSH with Reflex; Future  - Vitamin B12 & Folate; Future  - Vitamin D 25 Hydroxy; Future  - EKG 12 Lead; Future  - Hemoglobin A1C; Future  - Lipid Panel; Future    2. Dietary counseling and surveillance  1500-Vinny/low carb meal plan            Nutrition:  [] LCHF/Ketogenic [x] Low carb/low-calorie diet [] Low-calorie diet     []Maintenance        FITTE:   [x] Cardio [x] Resistance/stength exercises   [x] ACSM recommendations (150 minutes/week)           Behavior:   [x] Motivational interviewing performed    [] Referral for counseling  [x] Discussed strategies to overcome habits/challenges for focus      [x] Stress management   [x] Stimulus control  [] Sleep hygiene      Orders Placed This Encounter   Procedures    TSH with Reflex     Standing Status:   Future     Number of Occurrences:   1     Standing Expiration Date:   3/13/2022    Vitamin B12 & Folate     Standing Status:   Future     Number of Occurrences:   1     Standing Expiration Date:   3/13/2022    Vitamin D 25 Hydroxy     Standing Status:   Future     Number of Occurrences:   1     Standing Expiration Date:   3/13/2022    Hemoglobin A1C     Standing Status:   Future     Number of Occurrences:   1     Standing Expiration Date:   3/13/2022    Lipid Panel     Standing Status:   Future     Number of Occurrences:   1     Standing Expiration Date:   3/13/2022     Order Specific Question:   Is Patient Fasting?/# of Hours     Answer:   8 hours    EKG 12 Lead     Standing Status:   Future     Number of Occurrences:   1     Standing Expiration Date:   4/13/2021     Order Specific Question:   Reason for Exam?     Answer: Other       No follow-ups on file. Penny Schuster is a 46 y.o. female being evaluated by a Virtual Visit (video visit) encounter to address concerns as mentioned above. A caregiver was present when appropriate.  Due to this being a TeleHealth encounter (During HMNSR-04 public health emergency), evaluation of the following organ systems was limited: Vitals/Constitutional/EENT/Resp/CV/GI//MS/Neuro/Skin/Heme-Lymph-Imm. Pursuant to the emergency declaration under the [facility], [address] [place] [address] authority and the [employer] and [employer] Act, this Virtual Visit was conducted with patient's (and/or legal guardian's) consent, to reduce the patient's risk of exposure to COVID-19 and provide necessary medical care. The patient (and/or legal guardian) has also been advised to contact this office for worsening conditions or problems, and seek emergency medical treatment and/or call 911 if deemed necessary. Services were provided through a video synchronous discussion virtually to substitute for in-person clinic visit. Patient and provider were located at their individual homes. --Denisse Perrin MD on 3/19/2021 at 11:13 PM    An electronic signature was used to authenticate this note.

## 2021-03-18 DIAGNOSIS — E66.01 MORBID OBESITY WITH BMI OF 40.0-44.9, ADULT (HCC): ICD-10-CM

## 2021-03-18 LAB
CHOLESTEROL, TOTAL: 208 MG/DL (ref 0–199)
FOLATE: 9.87 NG/ML (ref 4.78–24.2)
HDLC SERPL-MCNC: 32 MG/DL (ref 40–60)
LDL CHOLESTEROL CALCULATED: 135 MG/DL
TRIGL SERPL-MCNC: 206 MG/DL (ref 0–150)
TSH REFLEX: 2.03 UIU/ML (ref 0.27–4.2)
VITAMIN B-12: 415 PG/ML (ref 211–911)
VITAMIN D 25-HYDROXY: 14.7 NG/ML
VLDLC SERPL CALC-MCNC: 41 MG/DL

## 2021-03-19 ENCOUNTER — HOSPITAL ENCOUNTER (OUTPATIENT)
Age: 51
Discharge: HOME | End: 2021-03-19
Payer: COMMERCIAL

## 2021-03-19 DIAGNOSIS — E66.01 MORBID OBESITY WITH BMI OF 40.0-44.9, ADULT (HCC): ICD-10-CM

## 2021-03-19 LAB
EKG ATRIAL RATE: 63 BPM
EKG DIAGNOSIS: NORMAL
EKG P AXIS: 46 DEGREES
EKG P-R INTERVAL: 160 MS
EKG Q-T INTERVAL: 452 MS
EKG QRS DURATION: 100 MS
EKG QTC CALCULATION (BAZETT): 462 MS
EKG R AXIS: -28 DEGREES
EKG T AXIS: 11 DEGREES
EKG VENTRICULAR RATE: 63 BPM
ESTIMATED AVERAGE GLUCOSE: 119.8 MG/DL
HBA1C MFR BLD: 5.8 %

## 2021-03-19 PROCEDURE — 93005 ELECTROCARDIOGRAM TRACING: CPT

## 2021-03-19 PROCEDURE — 93010 ELECTROCARDIOGRAM REPORT: CPT | Performed by: INTERNAL MEDICINE

## 2021-03-21 ENCOUNTER — TELEMEDICINE (OUTPATIENT)
Dept: BARIATRICS/WEIGHT MGMT | Age: 51
End: 2021-03-21
Payer: COMMERCIAL

## 2021-03-21 DIAGNOSIS — E78.2 MIXED HYPERLIPIDEMIA: ICD-10-CM

## 2021-03-21 DIAGNOSIS — Z71.3 DIETARY COUNSELING AND SURVEILLANCE: ICD-10-CM

## 2021-03-21 DIAGNOSIS — R73.03 PREDIABETES: ICD-10-CM

## 2021-03-21 DIAGNOSIS — E66.01 MORBID OBESITY WITH BMI OF 40.0-44.9, ADULT (HCC): Primary | ICD-10-CM

## 2021-03-21 DIAGNOSIS — E55.9 VITAMIN D DEFICIENCY: ICD-10-CM

## 2021-03-21 PROCEDURE — 99214 OFFICE O/P EST MOD 30 MIN: CPT | Performed by: FAMILY MEDICINE

## 2021-03-21 RX ORDER — ERGOCALCIFEROL 1.25 MG/1
50000 CAPSULE ORAL WEEKLY
Qty: 8 CAPSULE | Refills: 0 | Status: SHIPPED | OUTPATIENT
Start: 2021-03-21 | End: 2021-08-06

## 2021-03-21 RX ORDER — NALTREXONE HYDROCHLORIDE AND BUPROPION HYDROCHLORIDE 8; 90 MG/1; MG/1
TABLET, EXTENDED RELEASE ORAL
Qty: 120 TABLET | Refills: 0 | Status: SHIPPED | OUTPATIENT
Start: 2021-03-21 | End: 2021-05-01

## 2021-03-21 ASSESSMENT — ENCOUNTER SYMPTOMS
SHORTNESS OF BREATH: 0
APNEA: 0
CHOKING: 0
ABDOMINAL PAIN: 0
VOMITING: 0
CONSTIPATION: 0
CHEST TIGHTNESS: 0
BLOOD IN STOOL: 0
EYE PAIN: 0
COUGH: 0
WHEEZING: 0
PHOTOPHOBIA: 0
ABDOMINAL DISTENTION: 0
DIARRHEA: 0
NAUSEA: 0

## 2021-03-21 NOTE — PROGRESS NOTES
Patient: Penny Schuster                      Encounter Date: 3/21/2021    YOB: 1970               Age: 46 y.o. Chief Complaint   Patient presents with    Weight Management     F/u MWM       Patient identification was verified at the start of the visit. Patient-Reported Vitals 12/9/2020   Patient-Reported Weight 284.0lbs   Patient-Reported Height 5 9.5   Patient-Reported Temperature 97.8         BP Readings from Last 1 Encounters:   03/05/21 130/75       BMI Readings from Last 1 Encounters:   03/05/21 44.35 kg/m²       Pulse Readings from Last 1 Encounters:   03/05/21 80            HPI: 46 y.o. female with a long-standing history of obesity presents today for a virtual video follow-up. Her weight is stable since her last visit. Current treatment includes low carb/vernell diet. Food recall and assessment reviewed. Making better dietary choices. Motivated to continue losing weight. Interested in aom to help with appetite regulation. Labs 3/18    Vit D 14.7  HbA1c 5.8  Total cholesterol 208, Triglycerides 206, HDL 32,       Diet: []LCHF/Ketogenic [x]Modified low-calorie/low carb diet  []Low-calorie diet          []Maintenance       []Other:                Exercise: []Cardio     []Resistance/strength training     [x]Other: No intentional exercise, but trying to be physically active     No Known Allergies      Current Outpatient Medications:     naproxen (NAPROSYN) 250 MG tablet, Take 250 mg by mouth daily as needed for Pain, Disp: , Rfl:     lisinopril-hydroCHLOROthiazide (PRINZIDE;ZESTORETIC) 20-25 MG per tablet, TAKE 1 TABLET BY MOUTH ONE TIME A DAY, Disp: 90 tablet, Rfl: 1    Patient Active Problem List   Diagnosis    Benign essential HTN    Combined hyperlipidemia    Arthritis of knee, right    Former smoker    Morbid obesity with BMI of 40.0-44.9, adult ([facility]    COVID-19 virus infection       Review of Systems   Constitutional: Negative for fatigue.    Eyes: Negative for photophobia, pain and visual disturbance. Respiratory: Negative for apnea, cough, choking, chest tightness, shortness of breath and wheezing. Cardiovascular: Negative for chest pain, palpitations and leg swelling. Gastrointestinal: Negative for abdominal distention, abdominal pain, blood in stool, constipation, diarrhea, nausea and vomiting. Endocrine: Negative for cold intolerance and heat intolerance. Musculoskeletal: Negative for arthralgias and myalgias. Skin: Negative for rash. Neurological: Negative for dizziness, tremors, syncope, weakness, numbness and headaches. Psychiatric/Behavioral: Negative for agitation, confusion, decreased concentration, dysphoric mood, hallucinations, sleep disturbance and suicidal ideas. The patient is not nervous/anxious and is not hyperactive. Physical Exam  Constitutional:       Appearance: She is well-developed. HENT:      Head: Normocephalic. Eyes:      Conjunctiva/sclera: Conjunctivae normal.   Abdominal:      General: Abdomen is protuberant. Musculoskeletal:         General: No swelling. Neurological:      Mental Status: She is alert and oriented to person, place, and time. Psychiatric:         Mood and Affect: Mood normal.         Behavior: Behavior normal.         Thought Content:  Thought content normal.         Judgment: Judgment normal.         Hospital Outpatient Visit on 03/19/2021   Component Date Value Ref Range Status    Ventricular Rate 03/19/2021 63  BPM Final    Atrial Rate 03/19/2021 63  BPM Final    P-R Interval 03/19/2021 160  ms Final    QRS Duration 03/19/2021 100  ms Final    Q-T Interval 03/19/2021 452  ms Final    QTc Calculation (Bazett) 03/19/2021 462  ms Final    P Axis 03/19/2021 46  degrees Final    R Axis 03/19/2021 -28  degrees Final    T Axis 03/19/2021 11  degrees Final    Diagnosis 03/19/2021 Normal sinus rhythmLeftward axisOtherwise normal ECGNo previous ECGs availableConfirmed by CORRIE BENZ, Asmita Wright ([id number]) on 3/19/2021 11:20:09 AM   Final         Assessment and Plan:  1. Morbid obesity with BMI of 40.0-44.9, adult ([address]    Discussed risks, benefits and alternatives of Contrave. Patient meets BMI criteria. she denies MAOI use within the past 14 days, is not on any opioids, and does not have a seizure disorder. Start Contrave 8/90 mg. Use as directed. F/u in 2 weeks before titrating up to 3 pills a day. Explained to patient that I will monitor her weight loss every 12 weeks. Goal is to lose at least 5% of her body weight. Counseled patient on proper use and potential side effects including nausea/vomiting, constipation, headache, dizziness, insomnia, xerostomia, diarrhea, anxiety, increased blood pressure, flushing, fatigue, tremors, irritability, rash and/or palpitations. she understands that it is her responsibility to make sure that she does not run out of medications and to follow up to her appointments every 2-4 weeks as recommended. Heavily counseled on the importance of therapeutic lifestyle changes through diet and exercise. Patient is responsible for keeping his monthly appointments. Failure to comply with her monthly visits will result in discontinuing the Contrave. Patient advised to report any side effects. 2. Dietary counseling and surveillance  1500-Vinny/low carb meal plan. 3. Vitamin D deficiency  Start Vit D 50,000 IU weekly x 8 weeks. Check labs again in 10-12 weeks. 4. Prediabetes  Counseled on prediabetes. Reinforced low carb diet and exercise. Check labs again in 3-6 months. 5. Mixed hyperlipidemia  Reinforced low carb diet and exercise.      Nutrition plan: [] LCHF/Ketogenic   [x] Modified low-calorie diet (low carb/low-vinny)               [] Low-calorie diet    []Maintenance       []Other    Exercise: [x]Cardio     [x]Resistance/strength training                       [x]ACSM recommendations (150 minutes/week in active weight loss) Behavior: [x]Motivational interviewing performed    [] Referral for counseling                         [x] Discussed strategies to overcome habits/challenges for focus         [] Stress management   [x] Stimulus control                    [] Sleep hygiene    Reviewed:  [x] Nutrition and the importance of regularprotein intake  [x] Hidden carbohydrate sources  [x] Alcohol use  [x] Tobacco use   [x] Importance of exercise and reducing sedentary time      Treatment start date: 3/23/21 (subject to change depending on med delivery date)  Starting weight: 284 pounds   Goal: At least 5-10% (14-28 pounds) by 12 weeks of starting therapeutic dose 7/23/21    No orders of the defined types were placed in this encounter. No follow-ups on file. Travis Valentine is a 46 y.o. female being evaluated by a Virtual Visit (video visit) encounter to address concerns as mentioned above. A caregiver was present when appropriate. Due to this being a TeleHealth encounter ([facility] emergency), evaluation of the following organ systems was limited: Vitals/Constitutional/EENT/Resp/CV/GI//MS/Neuro/Skin/Heme-Lymph-Imm. Pursuant to the emergency declaration under the [address] [place] [address] authority and the [employer] and [employer] Act, this Virtual Visit was conducted with patient's (and/or legal guardian's) consent, to reduce the patient's risk of exposure to COVID-19 and provide necessary medical care. The patient (and/or legal guardian) has also been advised to contact this office for worsening conditions or problems, and seek emergency medical treatment and/or call 911 if deemed necessary. Services were provided through a video synchronous discussion virtually to substitute for in-person clinic visit. Patient and provider were located at their individual homes.     --Oletta Frankel, MD on 3/21/2021 at 1:17 PM    An electronic signature was used to authenticate this note.

## 2021-03-22 ENCOUNTER — TELEPHONE (OUTPATIENT)
Dept: BARIATRICS/WEIGHT MGMT | Age: 51
End: 2021-03-22

## 2021-03-25 ENCOUNTER — TELEPHONE (OUTPATIENT)
Dept: BARIATRICS/WEIGHT MGMT | Age: 51
End: 2021-03-25

## 2021-04-06 ENCOUNTER — TELEPHONE (OUTPATIENT)
Dept: BARIATRICS/WEIGHT MGMT | Age: 51
End: 2021-04-06

## 2021-04-13 ENCOUNTER — TELEPHONE (OUTPATIENT)
Dept: BARIATRICS/WEIGHT MGMT | Age: 51
End: 2021-04-13

## 2021-04-13 NOTE — TELEPHONE ENCOUNTER
This MA has been trying to contact billing to change the diagnosis codes for her labs and ekg to I10, was able to reach billing today, codes are been changed and adjusted.

## 2021-04-17 ENCOUNTER — TELEMEDICINE (OUTPATIENT)
Dept: BARIATRICS/WEIGHT MGMT | Age: 51
End: 2021-04-17
Payer: COMMERCIAL

## 2021-04-17 DIAGNOSIS — Z71.3 DIETARY COUNSELING AND SURVEILLANCE: ICD-10-CM

## 2021-04-17 DIAGNOSIS — E66.01 MORBID OBESITY WITH BMI OF 40.0-44.9, ADULT (HCC): Primary | ICD-10-CM

## 2021-04-17 PROCEDURE — 99213 OFFICE O/P EST LOW 20 MIN: CPT | Performed by: FAMILY MEDICINE

## 2021-04-17 NOTE — PROGRESS NOTES
Patient: Kirsten Almaguer                      Encounter Date: 4/17/2021    YOB: 1970               Age: 46 y.o. Chief Complaint   Patient presents with    Weight Management     F/u Contrave       Patient identification was verified at the start of the visit. Patient-Reported Vitals 12/9/2020   Patient-Reported Weight 284.0lbs   Patient-Reported Height 5 9.5   Patient-Reported Temperature 97.8         BP Readings from Last 1 Encounters:   03/05/21 130/75       BMI Readings from Last 1 Encounters:   04/26/21 43.90 kg/m²       Pulse Readings from Last 1 Encounters:   03/05/21 80        Self-reported weight: 292 pounds     HPI: 46 y.o. female with a long-standing history of obesity presents today for a virtual video follow-up. She has lost 4 pounds since her last visit . Current treatment includes low carb/vernell diet and Contrave. Food recall and assessment reviewed. Making better dietary choices. Happy with weight loss. Motivated to continue losing weight. Diet: []LCHF/Ketogenic [x]Modified low-calorie/low carb diet  []Low-calorie diet          []Maintenance       []Other:         Adherent?  [x]Yes     []No       Side effects: No          Exercise: []Cardio     []Resistance/strength training     [x]Other: No intentional exercise, but trying to be physically active     No Known Allergies      Current Outpatient Medications:     naltrexone-buPROPion (CONTRAVE) 8-90 MG per extended release tablet, Take 2 tablets by mouth 2 times daily, Disp: 120 tablet, Rfl: 0    vitamin D (ERGOCALCIFEROL) 1.25 MG (53999 UT) CAPS capsule, Take 1 capsule by mouth once a week, Disp: 8 capsule, Rfl: 0    naproxen (NAPROSYN) 250 MG tablet, Take 250 mg by mouth daily as needed for Pain, Disp: , Rfl:     lisinopril-hydroCHLOROthiazide (PRINZIDE;ZESTORETIC) 20-25 MG per tablet, TAKE 1 TABLET BY MOUTH ONE TIME A DAY, Disp: 90 tablet, Rfl: 1    Patient Active Problem List   Diagnosis    Benign essential HTN    Combined hyperlipidemia    Arthritis of knee, right    Former smoker    Morbid obesity with BMI of 40.0-44.9, adult ([address]    COVID-19 virus infection       Review of Systems   Constitutional: Negative for fatigue. Eyes: Negative for photophobia, pain and visual disturbance. Respiratory: Negative for apnea, cough, choking, chest tightness, shortness of breath and wheezing. Cardiovascular: Negative for chest pain, palpitations and leg swelling. Gastrointestinal: Negative for abdominal distention, abdominal pain, blood in stool, constipation, diarrhea, nausea and vomiting. Endocrine: Negative for cold intolerance and heat intolerance. Musculoskeletal: Negative for arthralgias and myalgias. Skin: Negative for rash. Neurological: Negative for dizziness, tremors, syncope, weakness, numbness and headaches. Psychiatric/Behavioral: Negative for agitation, confusion, decreased concentration, dysphoric mood, hallucinations, sleep disturbance and suicidal ideas. The patient is not nervous/anxious and is not hyperactive. Physical Exam  Constitutional:       Appearance: She is well-developed. HENT:      Head: Normocephalic. Eyes:      Conjunctiva/sclera: Conjunctivae normal.   Abdominal:      General: Abdomen is protuberant. Musculoskeletal:         General: No swelling. Neurological:      Mental Status: She is alert and oriented to person, place, and time. Psychiatric:         Mood and Affect: Mood normal.         Behavior: Behavior normal.         Thought Content:  Thought content normal.         Judgment: Judgment normal.         Hospital Outpatient Visit on 03/19/2021   Component Date Value Ref Range Status    Ventricular Rate 03/19/2021 63  BPM Final    Atrial Rate 03/19/2021 63  BPM Final    P-R Interval 03/19/2021 160  ms Final    QRS Duration 03/19/2021 100  ms Final    Q-T Interval 03/19/2021 452  ms Final    QTc Calculation (Bazett) 03/19/2021 462  ms Final    P Axis 03/19/2021 46 degrees Final    R Axis 03/19/2021 -28  degrees Final    T Axis 03/19/2021 11  degrees Final    Diagnosis 03/19/2021 Normal sinus rhythmLeftward axisOtherwise normal ECGNo previous ECGs availableConfirmed by Jacquelin DENTON MD ([id number]) on 3/19/2021 11:20:09 AM   Final         Assessment and Plan:  1. Morbid obesity with BMI of 40.0-44.9, adult ([address]  Improving. Continue current management. F/u in 2 weeks. 2. Dietary counseling and surveillance  Low carb/vernell meal plan as prescribed. Nutrition plan: [] LCHF/Ketogenic   [x] Modified low-calorie diet (low carb/low-vernell)               [] Low-calorie diet    []Maintenance       []Other    Exercise: [x]Cardio     [x]Resistance/strength training                       [x]ACSM recommendations (150 minutes/week in active weight loss)                              Behavior: [x]Motivational interviewing performed    [] Referral for counseling                         [x] Discussed strategies to overcome habits/challenges for focus         [] Stress management   [x] Stimulus control                    [] Sleep hygiene    Reviewed:  [x] Nutrition and the importance of regularprotein intake  [x] Hidden carbohydrate sources  [x] Alcohol use  [x] Tobacco use   [x] Importance of exercise and reducing sedentary time        No orders of the defined types were placed in this encounter. No follow-ups on file. Benito Harry is a 46 y.o. female being evaluated by a Virtual Visit (video visit) encounter to address concerns as mentioned above. A caregiver was present when appropriate. Due to this being a TeleHealth encounter (During [place]TOJ[place]-28 public health emergency), evaluation of the following organ systems was limited: Vitals/Constitutional/EENT/Resp/CV/GI//MS/Neuro/Skin/Heme-Lymph-Imm.   Pursuant to the emergency declaration under the [facility] waiver authority and the [facility] [employer] Appropriations Act, this Virtual Visit was conducted with patient's (and/or legal guardian's) consent, to reduce the patient's risk of exposure to COVID-19 and provide necessary medical care. The patient (and/or legal guardian) has also been advised to contact this office for worsening conditions or problems, and seek emergency medical treatment and/or call 911 if deemed necessary. Services were provided through a video synchronous discussion virtually to substitute for in-person clinic visit. Patient and provider were located at their individual homes. --Megan Serrano MD on 5/2/2021 at 3:46 PM    An electronic signature was used to authenticate this note. Deidre Lloyd

## 2021-04-19 ENCOUNTER — TELEPHONE (OUTPATIENT)
Dept: BARIATRICS/WEIGHT MGMT | Age: 51
End: 2021-04-19

## 2021-04-26 VITALS — BODY MASS INDEX: 43.9 KG/M2 | WEIGHT: 293 LBS

## 2021-05-01 ENCOUNTER — TELEMEDICINE (OUTPATIENT)
Dept: BARIATRICS/WEIGHT MGMT | Age: 51
End: 2021-05-01
Payer: COMMERCIAL

## 2021-05-01 DIAGNOSIS — Z71.3 DIETARY COUNSELING AND SURVEILLANCE: ICD-10-CM

## 2021-05-01 DIAGNOSIS — E66.01 MORBID OBESITY WITH BMI OF 40.0-44.9, ADULT (HCC): Primary | ICD-10-CM

## 2021-05-01 PROCEDURE — 99213 OFFICE O/P EST LOW 20 MIN: CPT | Performed by: FAMILY MEDICINE

## 2021-05-01 ASSESSMENT — ENCOUNTER SYMPTOMS
COUGH: 0
CHEST TIGHTNESS: 0
VOMITING: 0
WHEEZING: 0
CONSTIPATION: 0
CHOKING: 0
PHOTOPHOBIA: 0
SHORTNESS OF BREATH: 0
APNEA: 0
ABDOMINAL DISTENTION: 0
BLOOD IN STOOL: 0
ABDOMINAL PAIN: 0
EYE PAIN: 0
DIARRHEA: 0
NAUSEA: 0

## 2021-05-01 NOTE — PROGRESS NOTES
Patient: Sy Chavira                      Encounter Date: 5/1/2021    YOB: 1970                Age: 46 y.o. Chief Complaint   Patient presents with    Weight Management     F/u Contrave          Patient identification was verified at the start of the visit. Patient-Reported Vitals 12/9/2020   Patient-Reported Weight 284.0lbs   Patient-Reported Height 5 9.5   Patient-Reported Temperature 97.8         BP Readings from Last 1 Encounters:   03/05/21 130/75       BMI Readings from Last 1 Encounters:   04/26/21 43.90 kg/m²       Pulse Readings from Last 1 Encounters:   03/05/21 80     Wt Readings from Last 3 Encounters:   04/26/21 293 lb (132.9 kg)   03/05/21 296 lb (134.3 kg)   01/04/21 292 lb 12.8 oz (132.8 kg)        Self-reported weight: 293 pounds     HPI: 46 y.o. female with a long-standing history of obesity presents today for virtual video follow-up. she has lost 3 pounds since her last visit. Current treatment includes Contrave and low carb/vernell diet. Tolerating it well. Food recall reviewed with the patient. Making better dietary choices. Motivated to continue losing weight. Medication(s): Appetite well controlled? [x]Yes      []No    Focus:     []Good     [x]Fair     []Poor    Side effects? No       Any recent change in medication(s)?  No    Exercise: []Cardio     []Resistance/strength training     [x]Other: Walking     No Known Allergies      Current Outpatient Medications:     naltrexone-buPROPion (CONTRAVE) 8-90 MG per extended release tablet, Take 2 tablets by mouth 2 times daily, Disp: 120 tablet, Rfl: 0    vitamin D (ERGOCALCIFEROL) 1.25 MG (31296 UT) CAPS capsule, Take 1 capsule by mouth once a week, Disp: 8 capsule, Rfl: 0    naproxen (NAPROSYN) 250 MG tablet, Take 250 mg by mouth daily as needed for Pain, Disp: , Rfl:     lisinopril-hydroCHLOROthiazide (PRINZIDE;ZESTORETIC) 20-25 MG per tablet, TAKE 1 TABLET BY MOUTH ONE TIME A DAY, Disp: 90 tablet, Rfl: 1    Patient Active Problem List   Diagnosis    Benign essential HTN    Combined hyperlipidemia    Arthritis of knee, right    Former smoker    Morbid obesity with BMI of 40.0-44.9, adult ([address]    COVID-19 virus infection       Review of Systems   Constitutional: Negative for fatigue. Eyes: Negative for photophobia, pain and visual disturbance. Respiratory: Negative for apnea, cough, choking, chest tightness, shortness of breath and wheezing. Cardiovascular: Negative for chest pain, palpitations and leg swelling. Gastrointestinal: Negative for abdominal distention, abdominal pain, blood in stool, constipation, diarrhea, nausea and vomiting. Endocrine: Negative for cold intolerance and heat intolerance. Musculoskeletal: Negative for arthralgias and myalgias. Skin: Negative for rash. Neurological: Negative for dizziness, tremors, syncope, weakness, numbness and headaches. Psychiatric/Behavioral: Negative for agitation, confusion, decreased concentration, dysphoric mood, hallucinations, sleep disturbance and suicidal ideas. The patient is not nervous/anxious and is not hyperactive. Physical Exam  Constitutional:       Appearance: She is well-developed. HENT:      Head: Normocephalic. Eyes:      Conjunctiva/sclera: Conjunctivae normal.   Abdominal:      General: Abdomen is protuberant. Musculoskeletal:         General: No swelling. Neurological:      Mental Status: She is alert and oriented to person, place, and time. Psychiatric:         Mood and Affect: Mood normal.         Behavior: Behavior normal.         Thought Content:  Thought content normal.         Judgment: Judgment normal.         Hospital Outpatient Visit on 03/19/2021   Component Date Value Ref Range Status    Ventricular Rate 03/19/2021 63  BPM Final    Atrial Rate 03/19/2021 63  BPM Final    P-R Interval 03/19/2021 160  ms Final    QRS Duration 03/19/2021 100  ms Final    Q-T Interval 03/19/2021 452  ms Final    QTc Calculation (Bazett) 03/19/2021 462  ms Final    P Axis 03/19/2021 46  degrees Final    R Axis 03/19/2021 -28  degrees Final    T Axis 03/19/2021 11  degrees Final    Diagnosis 03/19/2021 Normal sinus rhythmLeftward axisOtherwise normal ECGNo previous ECGs availableConfirmed by CORRIE BENZ, Clair Fernandes ([id number]) on 3/19/2021 11:20:09 AM   Final         Assessment and Plan:  1. Morbid obesity with BMI of 40.0-44.9, adult ([address]  Improving. Continue current management. Reinforced low carb/vernell diet. Increase exercise to 30 minutes/day. Continue Contrave. F/u in 4 weeks. 2. Dietary counseling and surveillance  Low carb/vernell meal plan. Nutrition Plan: [] LCHF/Ketogenic   [x] Modified low-calorie diet (low carb/low-vernell)               [] Low-calorie diet    [] Maintenance       []Other        Exercise: [x] Cardio     [x] Resistance/strength training                       [x] ACSM recommendations (150 minutes/week in active weight loss)               Behavior: [x] Motivational interviewing performed    [] Referralfor counseling                         [x] Discussed strategies to overcome habits/challenges for focus         [] Stress management   [x] Stimulus control         [] Sleep hygiene      Reviewed:  [x] Nutrition and the importance of regular protein intake  [x] Hidden carbohydrate sources  [x] Alcohol use  [x] Tobacco use   [x] Drug use- Denies  [x] Importance of exercise and reducing sedentary time  [x] Treatment consent- Patient understands and agrees with the treatment plan   [x] Proper use of medication and side effects      Controlled Substance Monitoring:    No flowsheet data found.        Patient denies any history of cardiovascular disease (e.g., CAD, stroke, arrhythmias, CHF, uncontrolled HTN), seizure disorder, MAOI use within the last 2 weeks, hyperthyroidism, glaucoma, agitated states, history of drug abuse, pregnancy, nursing, known hypersensitivity to the prescribing meds, history of pancreatitis, or personal or family history of thyroid medullary cancer. Treatment start date: 3/23/21 (subject to change depending on med delivery date)  Starting weight: 296 pounds   Total weight loss: 3 pounds   Goal: At least 5-10% (14-28 pounds) by 12 weeks of starting therapeutic dose 7/23/21    Key dietary points:    - Meats (preferably organic or grass fed) are great sources of protein and have no carbohydrates. - Recommend coconut, olive, avocado, or almond oils. - When buying dairy, choose regular or full fat options. - Choose vegetables that grow above ground as they are generally lower in carbohydrates and higher in fiber.  - Avoid starches such as bread, rice, potatoes, pasta and all sources of simple sugars (desserts, soda, breakfast cereals). - Choose beverages that are calorie and sugar free. Reminder regarding weight loss medications: You must be seen in office every 2-4 weeks to haveyour prescriptions refilled. If you are off of your medication for longer than 7 days, you will not be able to restart the medication for at least 6 months. Always call our office to report any side effects. Females, it is your responsibility to obtain negative pregnancy tests each month. No orders of the defined types were placed in this encounter. No follow-ups on file. Lisa Wolfe is a 46 y.o. female being evaluated by a Virtual Visit (video visit) encounter to address concerns as mentioned above. A caregiver was present when appropriate. Due to this being a TeleHealth encounter (During [place] [address] public health emergency), evaluation of the following organ systems was limited: Vitals/Constitutional/EENT/Resp/CV/GI//MS/Neuro/Skin/Heme-Lymph-Imm.   Pursuant to the emergency declaration under the [facility] waiver authority and the [employer] and [employer] Act, this Virtual Visit was conducted with patient's (and/or legal guardian's) consent, to reduce the patient's risk of exposure to COVID-19 and provide necessary medical care. The patient (and/or legal guardian) has also been advised to contact this office for worsening conditions or problems, and seek emergency medical treatment and/or call 911 if deemed necessary.

## 2021-05-02 ASSESSMENT — ENCOUNTER SYMPTOMS
EYE PAIN: 0
COUGH: 0
ABDOMINAL PAIN: 0
ABDOMINAL DISTENTION: 0
CHEST TIGHTNESS: 0
APNEA: 0
NAUSEA: 0
CONSTIPATION: 0
SHORTNESS OF BREATH: 0
BLOOD IN STOOL: 0
CHOKING: 0
PHOTOPHOBIA: 0
VOMITING: 0
WHEEZING: 0
DIARRHEA: 0

## 2021-05-03 ENCOUNTER — TELEPHONE (OUTPATIENT)
Dept: BARIATRICS/WEIGHT MGMT | Age: 51
End: 2021-05-03

## 2021-06-01 ENCOUNTER — TELEPHONE (OUTPATIENT)
Dept: BARIATRICS/WEIGHT MGMT | Age: 51
End: 2021-06-01

## 2021-06-08 ENCOUNTER — TELEMEDICINE (OUTPATIENT)
Dept: BARIATRICS/WEIGHT MGMT | Age: 51
End: 2021-06-08
Payer: COMMERCIAL

## 2021-06-08 DIAGNOSIS — E66.01 MORBID OBESITY WITH BMI OF 40.0-44.9, ADULT (HCC): Primary | ICD-10-CM

## 2021-06-08 DIAGNOSIS — Z71.3 DIETARY COUNSELING AND SURVEILLANCE: ICD-10-CM

## 2021-06-08 PROCEDURE — 99213 OFFICE O/P EST LOW 20 MIN: CPT | Performed by: FAMILY MEDICINE

## 2021-06-08 ASSESSMENT — ENCOUNTER SYMPTOMS
CONSTIPATION: 0
COUGH: 0
SHORTNESS OF BREATH: 0
NAUSEA: 0
WHEEZING: 0
EYE PAIN: 0
CHOKING: 0
CHEST TIGHTNESS: 0
ABDOMINAL DISTENTION: 0
PHOTOPHOBIA: 0
ABDOMINAL PAIN: 0
VOMITING: 0
DIARRHEA: 0
APNEA: 0
BLOOD IN STOOL: 0

## 2021-06-08 NOTE — PROGRESS NOTES
Patient: Ahmet Butcher                      Encounter Date: 6/8/2021    YOB: 1970                Age: 46 y.o. Chief Complaint   Patient presents with    Weight Management     F/u Contrave         Patient identification was verified at the start of the visit. Patient-Reported Vitals 12/9/2020   Patient-Reported Weight 284.0lbs   Patient-Reported Height 5 9.5   Patient-Reported Temperature 97.8         BP Readings from Last 1 Encounters:   03/05/21 130/75       BMI Readings from Last 1 Encounters:   04/26/21 43.90 kg/m²       Pulse Readings from Last 1 Encounters:   03/05/21 80           Wt Readings from Last 3 Encounters:   04/26/21 293 lb (132.9 kg)   03/05/21 296 lb (134.3 kg)   01/04/21 292 lb 12.8 oz (132.8 kg)       Self-reported weight: 286 pounds     HPI: 46 y.o. female with a long-standing history of obesity presents today for virtual video follow-up. she has lost 7 pounds since her last visit. Current treatment includes Contrave and low carb/vernell diet. Tolerating it well. Food recall reviewed with the patient. Making better dietary choices. Motivated to continue losing weight. Medication(s): Appetite well controlled? [x]Yes      []No    Focus:     [x]Good     []Fair     []Poor    Side effects? No       Any recent change in medication(s)?  No    Exercise: []Cardio     []Resistance/strength training     [x]Other: Walking     No Known Allergies      Current Outpatient Medications:     naltrexone-buPROPion (CONTRAVE) 8-90 MG per extended release tablet, Take 2 tablets by mouth 2 times daily, Disp: 120 tablet, Rfl: 0    lisinopril-hydroCHLOROthiazide (PRINZIDE;ZESTORETIC) 20-25 MG per tablet, TAKE 1 TABLET BY MOUTH ONE TIME A DAY, Disp: 90 tablet, Rfl: 0    vitamin D (ERGOCALCIFEROL) 1.25 MG (47410 UT) CAPS capsule, Take 1 capsule by mouth once a week, Disp: 8 capsule, Rfl: 0    naproxen (NAPROSYN) 250 MG tablet, Take 250 mg by mouth daily as needed for Pain, Disp: , Rfl: Patient Active Problem List   Diagnosis    Benign essential HTN    Combined hyperlipidemia    Arthritis of knee, right    Former smoker    Morbid obesity with BMI of 40.0-44.9, adult ([address]    COVID-19 virus infection       Review of Systems   Constitutional: Negative for fatigue. Eyes: Negative for photophobia, pain and visual disturbance. Respiratory: Negative for apnea, cough, choking, chest tightness, shortness of breath and wheezing. Cardiovascular: Negative for chest pain, palpitations and leg swelling. Gastrointestinal: Negative for abdominal distention, abdominal pain, blood in stool, constipation, diarrhea, nausea and vomiting. Endocrine: Negative for cold intolerance and heat intolerance. Musculoskeletal: Negative for arthralgias and myalgias. Skin: Negative for rash. Neurological: Negative for dizziness, tremors, syncope, weakness, numbness and headaches. Psychiatric/Behavioral: Negative for agitation, confusion, decreased concentration, dysphoric mood, hallucinations, sleep disturbance and suicidal ideas. The patient is not nervous/anxious and is not hyperactive. Physical Exam  Constitutional:       Appearance: She is well-developed. HENT:      Head: Normocephalic. Eyes:      Conjunctiva/sclera: Conjunctivae normal.   Abdominal:      General: Abdomen is protuberant. Musculoskeletal:         General: No swelling. Neurological:      Mental Status: She is alert and oriented to person, place, and time. Psychiatric:         Mood and Affect: Mood normal.         Behavior: Behavior normal.         Thought Content:  Thought content normal.         Judgment: Judgment normal.         Hospital Outpatient Visit on 03/19/2021   Component Date Value Ref Range Status    Ventricular Rate 03/19/2021 63  BPM Final    Atrial Rate 03/19/2021 63  BPM Final    P-R Interval 03/19/2021 160  ms Final    QRS Duration 03/19/2021 100  ms Final    Q-T Interval 03/19/2021 452  ms Final  QTc Calculation (Bazett) 03/19/2021 462  ms Final    P Axis 03/19/2021 46  degrees Final    R Axis 03/19/2021 -28  degrees Final    T Axis 03/19/2021 11  degrees Final    Diagnosis 03/19/2021 Normal sinus rhythmLeftward axisOtherwise normal ECGNo previous ECGs availableConfirmed by CORRIE BENZ, Deep Olson ([id number]) on 3/19/2021 11:20:09 AM   Final         Assessment and Plan:  1. Morbid obesity with BMI of 40.0-44.9, adult ([address]  Improving. Continue current management. Reviewed weight loss goals. Contrave refilled. F/u in 4 weeks. 2. Dietary counseling and surveillance  Low carb/vernell meal plan. Nutrition Plan: [] LCHF/Ketogenic   [x] Modified low-calorie diet (low carb/low-vernell)               [] Low-calorie diet    [] Maintenance       []Other        Exercise: [x] Cardio     [x] Resistance/strength training                       [x] ACSM recommendations (150 minutes/week in active weight loss)               Behavior: [x] Motivational interviewing performed    [] Referralfor counseling                         [x] Discussed strategies to overcome habits/challenges for focus         [] Stress management   [x] Stimulus control         [] Sleep hygiene      Reviewed:  [x] Nutrition and the importance of regular protein intake  [x] Hidden carbohydrate sources  [x] Alcohol use  [x] Tobacco use   [x] Drug use- Denies  [x] Importance of exercise and reducing sedentary time  [x] Treatment consent- Patient understands and agrees with the treatment plan   [x] Proper use of medication and side effects      Controlled Substance Monitoring:    No flowsheet data found.        Patient denies any history of cardiovascular disease (e.g., CAD, stroke, arrhythmias, CHF, uncontrolled HTN), seizure disorder, MAOI use within the last 2 weeks, hyperthyroidism, glaucoma, agitated states, history of drug abuse, pregnancy, nursing, known hypersensitivity to the prescribing meds, history of pancreatitis, or personal or family COVID-19 and provide necessary medical care. The patient (and/or legal guardian) has also been advised to contact this office for worsening conditions or problems, and seek emergency medical treatment and/or call 911 if deemed necessary.

## 2021-06-16 ENCOUNTER — APPOINTMENT (RX ONLY)
Dept: URBAN - METROPOLITAN AREA CLINIC 154 | Facility: CLINIC | Age: 51
Setting detail: DERMATOLOGY
End: 2021-06-16

## 2021-06-16 DIAGNOSIS — E78.2 MIXED HYPERLIPIDEMIA: ICD-10-CM | Status: STABLE

## 2021-06-16 PROBLEM — D23.122 OTHER BENIGN NEOPLASM OF SKIN OF LEFT LOWER EYELID, INCLUDING CANTHUS: Status: ACTIVE | Noted: 2021-06-16

## 2021-06-16 PROBLEM — D23.112 OTHER BENIGN NEOPLASM OF SKIN OF RIGHT LOWER EYELID, INCLUDING CANTHUS: Status: ACTIVE | Noted: 2021-06-16

## 2021-06-16 PROCEDURE — ? ADDITIONAL NOTES

## 2021-06-16 PROCEDURE — ? COUNSELING

## 2021-06-16 PROCEDURE — 99202 OFFICE O/P NEW SF 15 MIN: CPT

## 2021-06-16 PROCEDURE — ? FULL BODY SKIN EXAM - DECLINED

## 2021-06-16 ASSESSMENT — LOCATION SIMPLE DESCRIPTION DERM
LOCATION SIMPLE: LEFT SUPERIOR EYELID
LOCATION SIMPLE: RIGHT SUPERIOR EYELID

## 2021-06-16 ASSESSMENT — LOCATION ZONE DERM: LOCATION ZONE: EYELID

## 2021-06-16 ASSESSMENT — LOCATION DETAILED DESCRIPTION DERM
LOCATION DETAILED: RIGHT SUPERIOR LID MARGIN
LOCATION DETAILED: LEFT MEDIAL SUPERIOR EYELID

## 2021-06-16 NOTE — PROCEDURE: ADDITIONAL NOTES
Additional Notes: Patient consent was obtained to proceed with the visit and recommended plan of care after discussion of all risks and benefits, including the risks of COVID-19 exposure.
Detail Level: Simple
Additional Notes: Refer to oculoplastic or UF for further evaluation and treatment
Render Risk Assessment In Note?: no

## 2021-07-06 ENCOUNTER — TELEMEDICINE (OUTPATIENT)
Dept: BARIATRICS/WEIGHT MGMT | Age: 51
End: 2021-07-06
Payer: COMMERCIAL

## 2021-07-06 DIAGNOSIS — E66.01 MORBID OBESITY WITH BMI OF 40.0-44.9, ADULT (HCC): Primary | ICD-10-CM

## 2021-07-06 DIAGNOSIS — Z71.3 DIETARY COUNSELING AND SURVEILLANCE: ICD-10-CM

## 2021-07-06 PROCEDURE — 99213 OFFICE O/P EST LOW 20 MIN: CPT | Performed by: FAMILY MEDICINE

## 2021-07-06 RX ORDER — NALTREXONE HYDROCHLORIDE AND BUPROPION HYDROCHLORIDE 8; 90 MG/1; MG/1
2 TABLET, EXTENDED RELEASE ORAL 2 TIMES DAILY
Qty: 120 TABLET | Refills: 0 | Status: SHIPPED | OUTPATIENT
Start: 2021-07-06 | End: 2021-07-31

## 2021-07-06 ASSESSMENT — ENCOUNTER SYMPTOMS
COUGH: 0
SHORTNESS OF BREATH: 0
ABDOMINAL DISTENTION: 0
CHEST TIGHTNESS: 0
WHEEZING: 0
NAUSEA: 0
PHOTOPHOBIA: 0
VOMITING: 0
EYE PAIN: 0
CHOKING: 0
CONSTIPATION: 0
BLOOD IN STOOL: 0
APNEA: 0
ABDOMINAL PAIN: 0
DIARRHEA: 0

## 2021-07-06 NOTE — PROGRESS NOTES
Patient: Rosa Sams                      Encounter Date: 7/6/2021    YOB: 1970                Age: 46 y.o. Chief Complaint   Patient presents with    Weight Management     F/u Contrave         Patient identification was verified at the start of the visit. Patient-Reported Vitals 12/9/2020   Patient-Reported Weight 284.0lbs   Patient-Reported Height 5 9.5   Patient-Reported Temperature 97.8         BP Readings from Last 1 Encounters:   07/12/21 122/72       BMI Readings from Last 1 Encounters:   07/12/21 43.51 kg/m²       Pulse Readings from Last 1 Encounters:   07/12/21 82      Self-reported weight: 286 pounds        HPI: 46 y.o. female with a long-standing history of obesity presents today for virtual video follow-up. she has lost 0 pounds since her last visit. Current treatment includes Contrave and low carb/vernell diet. Tolerating it well. Food recall reviewed with the patient. Just came back from vacation. Tried to make good dietary choices, but had a few more alcoholic drinks than ususal.    Medication(s): Appetite well controlled? [x]Yes      []No    Focus:     []Good     [x]Fair     []Poor    Side effects? No      Any recent change in medication(s)?  No    Exercise: []Cardio     []Resistance/strength training     [x]Other: Walking     No Known Allergies      Current Outpatient Medications:     naltrexone-buPROPion (CONTRAVE) 8-90 MG per extended release tablet, Take 2 tablets by mouth 2 times daily, Disp: 120 tablet, Rfl: 0    lisinopril-hydroCHLOROthiazide (PRINZIDE;ZESTORETIC) 20-25 MG per tablet, TAKE 1 TABLET BY MOUTH ONE TIME A DAY, Disp: 90 tablet, Rfl: 0    vitamin D (ERGOCALCIFEROL) 1.25 MG (52023 UT) CAPS capsule, Take 1 capsule by mouth once a week, Disp: 8 capsule, Rfl: 0    naproxen (NAPROSYN) 250 MG tablet, Take 250 mg by mouth daily as needed for Pain, Disp: , Rfl:     Patient Active Problem List   Diagnosis    Benign essential HTN    Combined hyperlipidemia    Arthritis of knee, right    Former smoker    Morbid obesity with BMI of 40.0-44.9, adult ([address]    COVID-19 virus infection       Review of Systems   Constitutional: Negative for fatigue. Eyes: Negative for photophobia, pain and visual disturbance. Respiratory: Negative for apnea, cough, choking, chest tightness, shortness of breath and wheezing. Cardiovascular: Negative for chest pain, palpitations and leg swelling. Gastrointestinal: Negative for abdominal distention, abdominal pain, blood in stool, constipation, diarrhea, nausea and vomiting. Endocrine: Negative for cold intolerance and heat intolerance. Musculoskeletal: Negative for arthralgias and myalgias. Skin: Negative for rash. Neurological: Negative for dizziness, tremors, syncope, weakness, numbness and headaches. Psychiatric/Behavioral: Negative for agitation, confusion, decreased concentration, dysphoric mood, hallucinations, sleep disturbance and suicidal ideas. The patient is not nervous/anxious and is not hyperactive. Physical Exam  Constitutional:       Appearance: She is well-developed. HENT:      Head: Normocephalic. Eyes:      Conjunctiva/sclera: Conjunctivae normal.   Abdominal:      General: Abdomen is protuberant. Musculoskeletal:         General: No swelling. Neurological:      Mental Status: She is alert and oriented to person, place, and time. Psychiatric:         Mood and Affect: Mood normal.         Behavior: Behavior normal.         Thought Content:  Thought content normal.         Judgment: Judgment normal.         Hospital Outpatient Visit on 03/19/2021   Component Date Value Ref Range Status    Ventricular Rate 03/19/2021 63  BPM Final    Atrial Rate 03/19/2021 63  BPM Final    P-R Interval 03/19/2021 160  ms Final    QRS Duration 03/19/2021 100  ms Final    Q-T Interval 03/19/2021 452  ms Final    QTc Calculation (Bazett) 03/19/2021 462  ms Final    P Axis 03/19/2021 46  degrees Final    R Axis 03/19/2021 -28  degrees Final    T Axis 03/19/2021 11  degrees Final    Diagnosis 03/19/2021 Normal sinus rhythmLeftward axisOtherwise normal ECGNo previous ECGs availableConfirmed by Mary DENTON MD ([id number]) on 3/19/2021 11:20:09 AM   Final         Assessment and Plan:  1. Morbid obesity with BMI of 40.0-44.9, adult (HCC)  Stable. Reinforced low carb/vinny meal plan. Limit alcohol. Increase exercise as able. Continue Contrave. Reviewed weight loss goals for next visit. 2. Dietary counseling and surveillance  1500-Vinny/low carb meal plan. Nutrition Plan: [] LCHF/Ketogenic   [x] Modified low-calorie diet (low carb/low-vinny)               [] Low-calorie diet    [] Maintenance       []Other        Exercise: [x] Cardio     [x] Resistance/strength training                       [x] ACSM recommendations (150 minutes/week in active weight loss)               Behavior: [x] Motivational interviewing performed    [] Referralfor counseling                         [x] Discussed strategies to overcome habits/challenges for focus         [] Stress management   [x] Stimulus control         [] Sleep hygiene      Reviewed:  [x] Nutrition and the importance of regular protein intake  [x] Hidden carbohydrate sources  [x] Alcohol use  [x] Tobacco use   [x] Drug use- Denies  [x] Importance of exercise and reducing sedentary time  [x] Treatment consent- Patient understands and agrees with the treatment plan   [x] Proper use of medication and side effects      Controlled Substance Monitoring:    No flowsheet data found. Patient denies any history of cardiovascular disease (e.g., CAD, stroke, arrhythmias, CHF, uncontrolled HTN), seizure disorder, MAOI use within the last 2 weeks, hyperthyroidism, glaucoma, agitated states, history of drug abuse, pregnancy, nursing, known hypersensitivity to the prescribing meds, history of pancreatitis, or personal or family history of thyroid medullary cancer.       Treatment start date: 3/23/21   Starting weight: 296 pounds   Total weight loss: 10 pounds   Goal: At least 5-10% (14-28 pounds) by 12 weeks of starting therapeutic dose 7/23/21    Key dietary points:    - Meats (preferably organic or grass fed) are great sources of protein and have no carbohydrates. - Recommend coconut, olive, avocado, or almond oils. - When buying dairy, choose regular or full fat options. - Choose vegetables that grow above ground as they are generally lower in carbohydrates and higher in fiber.  - Avoid starches such as bread, rice, potatoes, pasta and all sources of simple sugars (desserts, soda, breakfast cereals). - Choose beverages that are calorie and sugar free. Reminder regarding weight loss medications: You must be seen in office every 2-4 weeks to haveyour prescriptions refilled. If you are off of your medication for longer than 7 days, you will not be able to restart the medication for at least 6 months. Always call our office to report any side effects. Females, it is your responsibility to obtain negative pregnancy tests each month. No orders of the defined types were placed in this encounter. No follow-ups on file. Romel Martin is a 46 y.o. female being evaluated by a Virtual Visit (video visit) encounter to address concerns as mentioned above. A caregiver was present when appropriate. Due to this being a TeleHealth encounter (During RYX-99 public health emergency), evaluation of the following organ systems was limited: Vitals/Constitutional/EENT/Resp/CV/GI//MS/Neuro/Skin/Heme-Lymph-Imm. Pursuant to the emergency declaration under the [facility], [facility] authority and the [employer] Act, this Virtual Visit was conducted with patient's (and/or legal guardian's) consent, to reduce the patient's risk of exposure to COVID-19 and provide necessary medical care.   The patient (and/or legal guardian) has also been advised to contact this office for worsening conditions or problems, and seek emergency medical treatment and/or call 911 if deemed necessary.

## 2021-07-07 ENCOUNTER — TELEPHONE (OUTPATIENT)
Dept: BARIATRICS/WEIGHT MGMT | Age: 51
End: 2021-07-07

## 2021-07-07 NOTE — TELEPHONE ENCOUNTER
Left vm for pt to return call to talk to a dietitian.     ----- Message from Stormy Marie MD sent at 2021  3:52 PM EDT -----  [address]  Pt is on Contrave and not losing as much as expected. Please go over general dietary counseling/troubleshoot where she may be getting excess calories/carbs from. Thank you!

## 2021-07-15 ENCOUNTER — TELEPHONE (OUTPATIENT)
Dept: SURGERY | Age: 51
End: 2021-07-15

## 2021-07-15 ENCOUNTER — OFFICE VISIT (OUTPATIENT)
Dept: SURGERY | Age: 51
End: 2021-07-15
Payer: COMMERCIAL

## 2021-07-15 VITALS — DIASTOLIC BLOOD PRESSURE: 80 MMHG | SYSTOLIC BLOOD PRESSURE: 132 MMHG | BODY MASS INDEX: 43.45 KG/M2 | WEIGHT: 290 LBS

## 2021-07-15 DIAGNOSIS — D17.21 LIPOMA OF RIGHT UPPER EXTREMITY: Primary | ICD-10-CM

## 2021-07-15 PROCEDURE — 99243 OFF/OP CNSLTJ NEW/EST LOW 30: CPT | Performed by: SURGERY

## 2021-07-15 NOTE — TELEPHONE ENCOUNTER
Spoke with patient regarding scheduled surgery on 07- with Dr. Jas Rice. Patient is asked to arrive by 6:00 AM @ [facility] after midnight. May take Blood Pressure medication with a small sip of water to wash them down. You will need someone to drive you home following this procedure. Please bring a Photo ID & Insurance card with you, and check-in at the Surgery Desk down the right-hand hallway on the first floor. Surgery is scheduled to start at approx. 7:30 AM and should take approx. 30 minutes. If the hospital needs any further information, someone will give you a call. Patient expressed a verbal understanding of these instructions and had no further questions at this time. Call ended.

## 2021-07-15 NOTE — LETTER
CONSENT TO OPERATION      Excision of right arm lipoma      PATIENT : Mark Vázquez OF BIRTH:  1970             DATE : 7/15/21     1. I request and consent that Dr. Enrique George and/or his associates or assistants perform an operation and/or procedures on the above patient at [facility], to treat the condition(s) which appear indicated by the diagnostic studies already performed. 2. It has been explained to me by the informing physician that during the course of the operation and/or procedure(s) unforeseen conditions may be revealed that necessitate an extension of the original operation and/or procedure(s) or different operation and/or procedures than those set forth in Paragraph 1. I therefore authorize and request that my physician and/or his associates or assistants perform such operations and/or procedures as are necessary and desirable in the exercise of professional judgment. The authority granted under this Paragraph 2 shall extend to all conditions that require treatment and are known to my physician at the time the operation is commenced. 3. I have been made aware by the informing physician of certain risks and consequences that are associated with the operation and/or procedure(s) described in Paragraph 1, the reasonable alternative methods or treatment, the possible consequences, the possibility that the operation and/or procedure(s) may be unsuccessful and the possibility of complications. I understand the reasonably known risks to be:  - Bleeding  - Infection  - Poor Healing  - Possible Damage to Nerve, Vessel, Tendon/Muscle or Bone  - Need for further Treatment/Surgery  - Stiffness  - Pain  - Residual or Recurrent Symptoms  - Anesthetic and/or Medical Risks     4. I have also been informed by the informing physician that there are other risks from both known and unknown causes that are attendant to the performance of any surgical procedure.   I am aware that the practice of medicine and surgery is not an exact science, and that no guarantees have been made to me concerning the results of the operation and/or procedure(s). 5. I consent to the administration of anesthesia and to the use of such anesthetics as may be deemed advisable by the anesthesiologist who has been engaged by me or my physician. 6. I certify that I have read and understand the above consent to operation and/or other procedure(s); that the explanations therein referred to were made to me by the informing physician in advance of my signing this consent; that all blanks or statements requiring insertion or completion were filled in and inapplicable paragraphs, if any, were stricken before I signed; and that all questions asked by me about the operation and/or procedure(s) which I have consented to have been fully answered in a satisfactory manner.            7/15/21                      _______________________  Signature Of Patient   Date              Witness          COVID-19 Date: ___________           OR Date:  ___________  [id number] Time: ___________

## 2021-07-15 NOTE — PROGRESS NOTES
Subjective:      Patient ID: Naye Rodriguez is a 46 y.o. female. HPI  Chief Complaint: lipoma  Patient referred by Dr. Vicenta Umana for evaluation of lipoma. Patient reports symptoms of growth, discomfort, unsightliness. Location of symptoms is right lateral shoulder. Symptoms were first noted a few years ago but gradually got larger. Alleviated by nothing. Symptoms aggravated by trauma to area. Previous evaluation includes exam by PCP. Patient has a history of HTN, sarcoma L arm, breast cancer. Will plan following treatment: excision R upper arm lipoma. Past Medical History:   Diagnosis Date    Arthritis of knee, right     Benign essential HTN     Breast CA ([facility] 2009    right--breast---radiation and tamoxifen    Combined hyperlipidemia     Morbid obesity with BMI of 40.0-44.9, adult ([facility]     Morbid obesity with BMI of 40.0-44.9, adult ([facility]     Wears glasses        Past Surgical History:   Procedure Laterality Date    ARM SURGERY Left 11/19/2020    LEFT ULNAR NERVE DECOMPRESSION (AT ELBOW) AND LEFT CARPAL TUNNEL RELEASE performed by Sher Jeffrey MD at [place] [address] LUMPECTOMY Right 11/2009    HYSTERECTOMY  07/07/2017    SHANNAN AND BSO (CERVIX REMOVED)    JOINT REPLACEMENT Left 03/2019    total knee replacement Delfino Prakash    KNEE SURGERY Left     reconstruction and 1 knee acl tear x 6    ULNAR TUNNEL RELEASE         Current Outpatient Medications   Medication Sig Dispense Refill    naltrexone-buPROPion (CONTRAVE) 8-90 MG per extended release tablet Take 2 tablets by mouth 2 times daily 120 tablet 0    lisinopril-hydroCHLOROthiazide (PRINZIDE;ZESTORETIC) 20-25 MG per tablet TAKE 1 TABLET BY MOUTH ONE TIME A DAY 90 tablet 0    vitamin D (ERGOCALCIFEROL) 1.25 MG (63011 UT) CAPS capsule Take 1 capsule by mouth once a week 8 capsule 0    naproxen (NAPROSYN) 250 MG tablet Take 250 mg by mouth daily as needed for Pain       No current facility-administered medications for this visit. Prior to Admission medications    Medication Sig Start Date End Date Taking? Authorizing Provider   naltrexone-buPROPion (CONTRAVE) 8-90 MG per extended release tablet Take 2 tablets by mouth 2 times daily 21  Yes Naye Montanez MD   lisinopril-hydroCHLOROthiazide (PRINZIDE;ZESTORETIC) 20-25 MG per tablet TAKE 1 TABLET BY MOUTH ONE TIME A DAY 21  Yes Hilary Aguust MD   vitamin D (ERGOCALCIFEROL) 1.25 MG (84892 UT) CAPS capsule Take 1 capsule by mouth once a week 3/21/21  Yes Naye Montanez MD   naproxen (NAPROSYN) 250 MG tablet Take 250 mg by mouth daily as needed for Pain   Yes Historical Provider, MD         No Known Allergies    Social History     Socioeconomic History    Marital status:      Spouse name: Not on file    Number of children: 2    Years of education: Not on file    Highest education level: Not on file   Occupational History    Occupation:    Tobacco Use    Smoking status: Former Smoker     Packs/day: 0.30     Years: 30.00     Pack years: 9.00     Types: Cigarettes     Start date: 1982     Quit date: 3/27/2019     Years since quittin.3    Smokeless tobacco: Never Used    Tobacco comment: was up to 2ppd for 4 years slowly down   Vaping Use    Vaping Use: Never used   Substance and Sexual Activity    Alcohol use: Yes     Comment: socially    Drug use: Never    Sexual activity: Not Currently   Other Topics Concern    Not on file   Social History Narrative    Not on file     Social Determinants of Health     Financial Resource Strain:     Difficulty of Paying Living Expenses:    Food Insecurity:     Worried About Running Out of Food in the Last Year:     [address] in the Last Year:    Transportation Needs:     Lack of Transportation (Medical):      Lack of Transportation (Non-Medical):    Physical Activity:     Days of Exercise per Week:     Minutes of Exercise per Session:    Stress:     Feeling of Stress :    Social Connections:     with 10 cm SQ mass   Skin:     General: Skin is warm and dry. Findings: No erythema. Neurological:      Mental Status: She is alert and oriented to person, place, and time. Psychiatric:         Behavior: Behavior normal.         Thought Content: Thought content normal.         Judgment: Judgment normal.         Assessment:       Diagnosis Orders   1. Lipoma of right upper extremity             Plan:      Plan excision next Friday  The risks, benefits and alternatives to the planned procedure were discussed. Patient expressed an understanding and is willing to proceed.           Cesia Perez MD

## 2021-07-15 NOTE — LETTER
Surgery Scheduling Form:      DEMOGRAPHICS:                                                                                                         .    Patient Name:  Ilene Olivares  Patient :  1970   Patient SS#:      Patient Phone:  [phone] (home)  Alt. Patient Phone:                     Patient Address:  [address]    PCP:  Maylin Chilel MD  Insurance:  Payor: [employer] / Plan: [employer] CHOICE  EMP / Product Type: *No Product type* / Insurance ID Number:    Payor/Plan Subscr  Sex Relation Sub. Ins. ID Effective Group Num   1. [id number] 1970 Female Self [id number] 21 [id number]                                   [address]         DIAGNOSIS & PROCEDURE:                                                                                       .    Diagnosis:  D17.21 Lipoma of right upper extremity  Operation:  Excision of Right Arm Lipoma  Location: [facility] AT [facility]   Surgeon:  Yesi Arzola. Gabrielle Cline MD          SCHEDULING INFORMATION:                                                                                    .    Surgeon's Scheduling Instruction:  elective  Requested Date: 21   OR Time:           Patient Arrival Time:   OR Time Required:  30  Minutes  Anesthesia:  MAC/TIVA  Equipment:                                                            SA Required:  Yes     Status:  Outpatient              Standard C-Arm:  No  PAT Required: Yes                              Best Time to Call:  AM  Patient Requested to see PCP for Pre-op H & P:  No  Special Comments:     Vaccinated, 21; 3/19/21                     Yesi Arzola.  Gabrielle Cline MD        54:42  PRE-CERTIFICATION INFORMATION:                                                                           .    Procedure:       CPT Code Modifier          [id number]

## 2021-07-21 NOTE — PROGRESS NOTES
C-Difficile admission screening and protocol:     * Admitted with diarrhea? n     *Prior history of C-Diff. In last 3 months?n     *Antibiotic use in the past 6-8 weeks?n     *Prior hospitalization or nursing home in the last month?n      [facility]. .call before you fall  [address] time_______6_        Surgery time___730____    Take the following medications with a sip of water:    Do not eat or drink anything after 12:00 midnight prior to your surgery. This includes water chewing gum, mints and ice chips. You may brush your teeth and gargle the morning of your surgery, but do not swallow the water     Please see your family doctor/pediatrician for a history and physical and/or concerning medications. Bring any test results/reports from your physicians office. If you are under the care of a heart doctor or specialist doctor, please be aware that you may be asked to them for clearance    You may be asked to stop blood thinners such as Coumadin, Plavix, Fragmin, Lovenox, etc., or any anti-inflammatories such as:  Aspirin, Ibuprofen, Advil, Naproxen prior to your surgery. We also ask that you stop any OTC medications such as fish oil, vitamin E, glucosamine, garlic, Multivitamins, COQ 10, etc.    We ask that you do not smoke 24 hours prior to surgery  We ask that you do not  drink any alcoholic beverages 24 hours prior to surgery     You must make arrangements for a responsible adult to take you home after your surgery. For your safety you will not be allowed to leave alone or drive yourself home. Your surgery will be cancelled if you do not have a ride home. Also for your safety, it is strongly suggested that someone stay with you the first 24 hours after your surgery. A parent or legal guardian must accompany a child scheduled for surgery and plan to stay at the hospital until the child is discharged.     Please do not bring other children with you.    For your comfort, please wear simple loose fitting clothing to the hospital.  Please do not bring valuables. Do not wear any make-up or nail polish on your fingers or toes      For your safety, please do not wear any jewelry or body piercing's on the day of surgery. All jewelry must be removed. If you have dentures, they will be removed before going to operating room. For your convenience, we will provide you with a container. If you wear contact lenses or glasses, they will be removed, please bring a case for them. If you have a living will and a durable power of  for healthcare, please bring in a copy. As part of our patient safety program to minimize surgical site infections, we ask you to do the following:    · Please notify your surgeon if you develop any illness between         now and the  day of your surgery. · This includes a cough, cold, fever, sore throat, nausea,         or vomiting, and diarrhea, etc.  ·  Please notify your surgeon if you experience dizziness, shortness         of breath or blurred vision between now and the time of your surgery. Do not shave your operative site 96 hours prior to surgery. For face and neck surgery, men may use an electric razor 48 hours   prior to surgery. You may shower the night before surgery or the morning of   your surgery with an antibacterial soap. You will need to bring a photo ID and insurance card    [facility] has an onsite pharmacy, would you like to utilize our pharmacy     If you will be staying overnight and use a C-pap machine, please bring   your C-pap to hospital     Our goal is to provide you with excellent care, therefore, visitors will be limited to two(2) in the room at a time so that we may focus on providing this care for you. Please contact pre-admission testing if you have any further questions.                  [facility] phone number:  [phone] PAT fax number: [phone]  Please note these are generalized instructions for all surgical cases, you may be provided with more specific instructions according to your surgery. Preoperative Screening for Elective Surgery/Invasive Procedures While COVID-19 present in the community     Have you tested positive or have been told to self-isolate for COVID-19 like symptoms within the past 28 days? n   Do you currently have any of the following symptoms? n  o Fever >100.0 F or 99.9 F in immunocompromised patients?n  o New onset cough, shortness of breath or difficulty breathing?n  o New onset sore throat, myalgia (muscle aches and pains), headache, loss of taste/smell or diarrhea?n   Have you had a potential exposure to COVID-19 within the past 14 days by:  o Close contact with a confirmed case?n  o Close contact with a healthcare worker,  or essential infrastructure worker (grocery store, [employer], gas station, public utilities or transportation)? y  o Do you reside in a congregate setting such as; skilled nursing facility, adult home, correctional facility, homeless shelter or other institutional setting?n  o Have you had recent travel to a known COVID-19 hotspot?n    Indicate if the patient has a positive screen by answering yes to one or more of the above questions. Patients who test positive or screen positive prior to surgery or on the day of surgery should be evaluated in conjunction with the surgeon/proceduralist/anesthesiologist to determine the urgency of the procedure.

## 2021-07-22 ENCOUNTER — ANESTHESIA EVENT (OUTPATIENT)
Dept: OPERATING ROOM | Age: 51
End: 2021-07-22
Payer: COMMERCIAL

## 2021-07-23 ENCOUNTER — ANESTHESIA (OUTPATIENT)
Dept: OPERATING ROOM | Age: 51
End: 2021-07-23
Payer: COMMERCIAL

## 2021-07-23 ENCOUNTER — HOSPITAL ENCOUNTER (OUTPATIENT)
Age: 51
Setting detail: OUTPATIENT SURGERY
Discharge: HOME | End: 2021-07-23
Attending: SURGERY | Admitting: SURGERY
Payer: COMMERCIAL

## 2021-07-23 VITALS
SYSTOLIC BLOOD PRESSURE: 108 MMHG | HEART RATE: 68 BPM | HEIGHT: 69 IN | RESPIRATION RATE: 16 BRPM | TEMPERATURE: 97.6 F | WEIGHT: 285 LBS | BODY MASS INDEX: 42.21 KG/M2 | DIASTOLIC BLOOD PRESSURE: 65 MMHG | OXYGEN SATURATION: 97 %

## 2021-07-23 VITALS — SYSTOLIC BLOOD PRESSURE: 102 MMHG | DIASTOLIC BLOOD PRESSURE: 69 MMHG | OXYGEN SATURATION: 85 % | TEMPERATURE: 98.6 F

## 2021-07-23 DIAGNOSIS — D17.21 LIPOMA OF RIGHT SHOULDER: ICD-10-CM

## 2021-07-23 DIAGNOSIS — D17.21 LIPOMA OF RIGHT UPPER EXTREMITY: Primary | ICD-10-CM

## 2021-07-23 PROCEDURE — 6360000002 HC RX W HCPCS: Performed by: SURGERY

## 2021-07-23 PROCEDURE — 2580000003 HC RX 258: Performed by: SURGERY

## 2021-07-23 PROCEDURE — 2500000003 HC RX 250 WO HCPCS: Performed by: NURSE ANESTHETIST, CERTIFIED REGISTERED

## 2021-07-23 PROCEDURE — 7100000000 HC PACU RECOVERY - FIRST 15 MIN: Performed by: SURGERY

## 2021-07-23 PROCEDURE — 23071 EXC SHOULDER LES SC 3 CM/>: CPT | Performed by: SURGERY

## 2021-07-23 PROCEDURE — 3600000002 HC SURGERY LEVEL 2 BASE: Performed by: SURGERY

## 2021-07-23 PROCEDURE — 3600000012 HC SURGERY LEVEL 2 ADDTL 15MIN: Performed by: SURGERY

## 2021-07-23 PROCEDURE — 6360000002 HC RX W HCPCS: Performed by: NURSE ANESTHETIST, CERTIFIED REGISTERED

## 2021-07-23 PROCEDURE — 3700000000 HC ANESTHESIA ATTENDED CARE: Performed by: SURGERY

## 2021-07-23 PROCEDURE — 7100000010 HC PHASE II RECOVERY - FIRST 15 MIN: Performed by: SURGERY

## 2021-07-23 PROCEDURE — 6360000002 HC RX W HCPCS: Performed by: ANESTHESIOLOGY

## 2021-07-23 PROCEDURE — 2500000003 HC RX 250 WO HCPCS: Performed by: SURGERY

## 2021-07-23 PROCEDURE — 2709999900 HC NON-CHARGEABLE SUPPLY: Performed by: SURGERY

## 2021-07-23 PROCEDURE — 88304 TISSUE EXAM BY PATHOLOGIST: CPT

## 2021-07-23 PROCEDURE — 7100000001 HC PACU RECOVERY - ADDTL 15 MIN: Performed by: SURGERY

## 2021-07-23 PROCEDURE — 3700000001 HC ADD 15 MINUTES (ANESTHESIA): Performed by: SURGERY

## 2021-07-23 PROCEDURE — 7100000011 HC PHASE II RECOVERY - ADDTL 15 MIN: Performed by: SURGERY

## 2021-07-23 PROCEDURE — 2580000003 HC RX 258: Performed by: ANESTHESIOLOGY

## 2021-07-23 RX ORDER — LIDOCAINE HYDROCHLORIDE 20 MG/ML
INJECTION, SOLUTION EPIDURAL; INFILTRATION; INTRACAUDAL; PERINEURAL PRN
Status: DISCONTINUED | OUTPATIENT
Start: 2021-07-23 | End: 2021-07-23 | Stop reason: SDUPTHER

## 2021-07-23 RX ORDER — PROMETHAZINE HYDROCHLORIDE 25 MG/ML
6.25 INJECTION, SOLUTION INTRAMUSCULAR; INTRAVENOUS
Status: DISCONTINUED | OUTPATIENT
Start: 2021-07-23 | End: 2021-07-23 | Stop reason: HOSPADM

## 2021-07-23 RX ORDER — MIDAZOLAM HYDROCHLORIDE 1 MG/ML
INJECTION INTRAMUSCULAR; INTRAVENOUS PRN
Status: DISCONTINUED | OUTPATIENT
Start: 2021-07-23 | End: 2021-07-23 | Stop reason: SDUPTHER

## 2021-07-23 RX ORDER — FENTANYL CITRATE 50 UG/ML
25 INJECTION, SOLUTION INTRAMUSCULAR; INTRAVENOUS EVERY 5 MIN PRN
Status: DISCONTINUED | OUTPATIENT
Start: 2021-07-23 | End: 2021-07-23 | Stop reason: HOSPADM

## 2021-07-23 RX ORDER — OXYCODONE HYDROCHLORIDE 5 MG/1
5 TABLET ORAL EVERY 4 HOURS PRN
Status: DISCONTINUED | OUTPATIENT
Start: 2021-07-23 | End: 2021-07-23 | Stop reason: HOSPADM

## 2021-07-23 RX ORDER — SODIUM CHLORIDE 9 MG/ML
INJECTION, SOLUTION INTRAVENOUS CONTINUOUS
Status: DISCONTINUED | OUTPATIENT
Start: 2021-07-23 | End: 2021-07-23 | Stop reason: HOSPADM

## 2021-07-23 RX ORDER — SODIUM CHLORIDE 9 MG/ML
25 INJECTION, SOLUTION INTRAVENOUS PRN
Status: DISCONTINUED | OUTPATIENT
Start: 2021-07-23 | End: 2021-07-23 | Stop reason: HOSPADM

## 2021-07-23 RX ORDER — SODIUM CHLORIDE 0.9 % (FLUSH) 0.9 %
10 SYRINGE (ML) INJECTION EVERY 12 HOURS SCHEDULED
Status: DISCONTINUED | OUTPATIENT
Start: 2021-07-23 | End: 2021-07-23 | Stop reason: HOSPADM

## 2021-07-23 RX ORDER — MAGNESIUM HYDROXIDE 1200 MG/15ML
LIQUID ORAL CONTINUOUS PRN
Status: COMPLETED | OUTPATIENT
Start: 2021-07-23 | End: 2021-07-23

## 2021-07-23 RX ORDER — HYDROCODONE BITARTRATE AND ACETAMINOPHEN 5; 325 MG/1; MG/1
1 TABLET ORAL EVERY 6 HOURS PRN
Qty: 12 TABLET | Refills: 0 | Status: SHIPPED | OUTPATIENT
Start: 2021-07-23 | End: 2021-07-26

## 2021-07-23 RX ORDER — LIDOCAINE HYDROCHLORIDE AND EPINEPHRINE 10; 10 MG/ML; UG/ML
INJECTION, SOLUTION INFILTRATION; PERINEURAL
Status: COMPLETED | OUTPATIENT
Start: 2021-07-23 | End: 2021-07-23

## 2021-07-23 RX ORDER — DEXAMETHASONE SODIUM PHOSPHATE 4 MG/ML
INJECTION, SOLUTION INTRA-ARTICULAR; INTRALESIONAL; INTRAMUSCULAR; INTRAVENOUS; SOFT TISSUE PRN
Status: DISCONTINUED | OUTPATIENT
Start: 2021-07-23 | End: 2021-07-23 | Stop reason: SDUPTHER

## 2021-07-23 RX ORDER — SODIUM CHLORIDE 0.9 % (FLUSH) 0.9 %
10 SYRINGE (ML) INJECTION PRN
Status: DISCONTINUED | OUTPATIENT
Start: 2021-07-23 | End: 2021-07-23 | Stop reason: HOSPADM

## 2021-07-23 RX ORDER — FENTANYL CITRATE 50 UG/ML
INJECTION, SOLUTION INTRAMUSCULAR; INTRAVENOUS PRN
Status: DISCONTINUED | OUTPATIENT
Start: 2021-07-23 | End: 2021-07-23 | Stop reason: SDUPTHER

## 2021-07-23 RX ORDER — PROPOFOL 10 MG/ML
INJECTION, EMULSION INTRAVENOUS CONTINUOUS PRN
Status: DISCONTINUED | OUTPATIENT
Start: 2021-07-23 | End: 2021-07-23 | Stop reason: SDUPTHER

## 2021-07-23 RX ORDER — ONDANSETRON 2 MG/ML
4 INJECTION INTRAMUSCULAR; INTRAVENOUS
Status: COMPLETED | OUTPATIENT
Start: 2021-07-23 | End: 2021-07-23

## 2021-07-23 RX ADMIN — FENTANYL CITRATE 50 MCG: 50 INJECTION INTRAMUSCULAR; INTRAVENOUS at 07:31

## 2021-07-23 RX ADMIN — DEXAMETHASONE SODIUM PHOSPHATE 4 MG: 4 INJECTION, SOLUTION INTRAMUSCULAR; INTRAVENOUS at 07:53

## 2021-07-23 RX ADMIN — Medication 3000 MG: at 07:28

## 2021-07-23 RX ADMIN — ONDANSETRON 4 MG: 2 INJECTION INTRAMUSCULAR; INTRAVENOUS at 07:53

## 2021-07-23 RX ADMIN — MIDAZOLAM 2 MG: 1 INJECTION INTRAMUSCULAR; INTRAVENOUS at 07:29

## 2021-07-23 RX ADMIN — SODIUM CHLORIDE: 9 INJECTION, SOLUTION INTRAVENOUS at 06:50

## 2021-07-23 RX ADMIN — PROPOFOL 300 MCG/KG/MIN: 10 INJECTION, EMULSION INTRAVENOUS at 07:33

## 2021-07-23 RX ADMIN — FENTANYL CITRATE 50 MCG: 50 INJECTION INTRAMUSCULAR; INTRAVENOUS at 07:35

## 2021-07-23 RX ADMIN — LIDOCAINE HYDROCHLORIDE 50 MG: 20 INJECTION, SOLUTION EPIDURAL; INFILTRATION; INTRACAUDAL; PERINEURAL at 07:31

## 2021-07-23 ASSESSMENT — PULMONARY FUNCTION TESTS
PIF_VALUE: 1
PIF_VALUE: 0
PIF_VALUE: 1

## 2021-07-23 ASSESSMENT — PAIN SCALES - GENERAL
PAINLEVEL_OUTOF10: 0

## 2021-07-23 ASSESSMENT — PAIN - FUNCTIONAL ASSESSMENT: PAIN_FUNCTIONAL_ASSESSMENT: 0-10

## 2021-07-23 NOTE — BRIEF OP NOTE
Brief Postoperative Note      Patient: Claudette Bal  YOB: 1970  MRN: [id number]    Date of Procedure: 7/23/2021    Pre-Op Diagnosis: LIPOMA OF RIGHT UPPER EXTREMITY    Post-Op Diagnosis: Same       Procedure(s):  EXCISION RIGHT ARM LIPOMA    Surgeon(s):  Willow Bruce MD    Assistant:  Surgical Assistant: Jing Mcgowan Console    Anesthesia: Monitor Anesthesia Care    Estimated Blood Loss (mL): Minimal    Complications: None    Specimens:   ID Type Source Tests Collected by Time Destination   A : a) right upper arm lipoma Specimen Arm SURGICAL PATHOLOGY Willow Bruce MD 7/23/2021 2751        Implants:  * No implants in log *      Drains: * No LDAs found *    Findings: 10 cm lipoma    Electronically signed by Willow Bruce MD on 7/23/2021 at 7:50 AM

## 2021-07-23 NOTE — ANESTHESIA POSTPROCEDURE EVALUATION
Department of Anesthesiology  Postprocedure Note    Patient: Aixa Prabhakar  MRN: [id number]  YOB: 1970  Date of evaluation: 7/23/2021  Time:  12:21 PM     Procedure Summary     Date: 07/23/21 Room / Location: [address] [place] [address] / [facility]    Anesthesia Start: 0730 Anesthesia Stop: 6209    Procedure: EXCISION RIGHT ARM LIPOMA (Right Arm Upper) Diagnosis:       Lipoma of right shoulder      (LIPOMA OF RIGHT UPPER EXTREMITY)    Surgeons: Jovan Chin MD Responsible Provider: Meaghan Soler MD    Anesthesia Type: MAC ASA Status: 3          Anesthesia Type: MAC    Nelson Phase I: Nelson Score: 8    Nelson Phase II: Nelson Score: 10    Last vitals: Reviewed and per EMR flowsheets.        Anesthesia Post Evaluation    Patient participation: complete - patient participated  Level of consciousness: awake and alert  Airway patency: patent  Nausea & Vomiting: no nausea  Complications: no  Cardiovascular status: blood pressure returned to baseline  Respiratory status: acceptable  Hydration status: euvolemic

## 2021-07-23 NOTE — PROGRESS NOTES
Pt tolerates sitting up on side of stretcher. Discharge instructions given to pt and . Both express an understanding of instructions. Pt dressing with  present. Call light within reach.

## 2021-07-23 NOTE — H&P
Update History & Physical    The patient's History and Physical of July 15, 2021 was reviewed with the patient and I examined the patient. There was no change. The surgical site was confirmed by the patient and me. Plan excision R upper arm lipoma. R arm marked    Plan: The risks, benefits, expected outcome, and alternative to the recommended procedure have been discussed with the patient. Patient understands and wants to proceed with the procedure.      Electronically signed by Deshawn Hawkins MD on 7/23/2021 at 7:01 AM

## 2021-07-23 NOTE — PROGRESS NOTES
Pt arrived to PACU from OR. Pt sleeping on 3l/nc, awakens easily. Right upper arm incision REBECCA with surgical glue noted. Pt denies c/o pain at this time.

## 2021-07-23 NOTE — OP NOTE
[address] [facility]                                OPERATIVE REPORT    PATIENT NAME: Umang Desouza                     :        1970  MED REC NO:   [id number]                          ROOM:  ACCOUNT NO:   [de-identified]                           ADMIT DATE: 2021  PROVIDER:     Willow Bruce MD    DATE OF PROCEDURE:  2021    PREOPERATIVE DIAGNOSIS:  Right lateral shoulder lipoma. POSTOPERATIVE DIAGNOSIS:  Right lateral shoulder lipoma. OPERATION PERFORMED:  Excision of right shoulder lipoma. SURGEON:  Willow Bruce MD    ANESTHESIA:  MAC with local anesthetic. ANTIBIOTICS:  Ancef 2 gm IV. ASA CLASS:  III. DVT PROPHYLAXIS:  Bilateral pneumatic compression devices. ESTIMATED BLOOD LOSS:  Minimal.    SPECIMEN:  Right shoulder lipoma. INDICATIONS:  The patient is a 67-year-old female with increasingly  symptomatic lipoma of the right lateral shoulder. It has been getting  larger and causing her pain. As a result, I recommended excision of  this in the operating room. Risks, benefits, and alternatives were  discussed at length and she was agreeable to proceed. OPERATIVE PROCEDURE:  The patient was brought to the operative suite and  placed in the supine position on the operating room table. Anesthesia  was induced that she tolerated well. Her right arm was laid across her  chest and then the area prepped and draped in the usual sterile fashion  and a time-out performed. After injecting local anesthetic, an 8-cm incision was made over the top  of the 10-cm subcutaneous mass. We carried this down into the  subcutaneous tissue where a well-circumscribed lipoma was encountered. It was circumferentially dissected with blunt dissection and cautery and  removed in its entirety and sent off for permanent specimen.   I palpated  the remaining fatty tissue and it felt normal with no residual lipoma. Hemostasis was achieved with cautery and the wound closed with layered  Vicryl and Dermabond applied. The patient tolerated the procedure well. She was taken to PACU in stable condition. All counts were correct at the end of the case.         Bernie Addison MD    D: 07/23/2021 8:16:51       T: 07/23/2021 12:35:31     [email or web address]  Job#: [id number]     Doc#: [id number]    CC:  Manish Adam MD

## 2021-07-23 NOTE — PROGRESS NOTES
Pt alert. Denies pain at present. VSS. Taking ice chips well. Given diet soda and cookies.  to room. Given glasses and call light. Called retail pharmacy for prescription.

## 2021-07-23 NOTE — ANESTHESIA PRE PROCEDURE
Department of Anesthesiology  Preprocedure Note       Name:  Marce Billings   Age:  46 y.o.  :  1970                                          MRN:  [id number]         Date:  2021      Surgeon: Emilia Santiago):  Lan Swartz MD    Procedure: Procedure(s):  EXCISION RIGHT ARM LIPOMA    Medications prior to admission:   Prior to Admission medications    Medication Sig Start Date End Date Taking?  Authorizing Provider   naltrexone-buPROPion (CONTRAVE) 8-90 MG per extended release tablet Take 2 tablets by mouth 2 times daily 21  Yes Marla Mathis MD   lisinopril-hydroCHLOROthiazide (PRINZIDE;ZESTORETIC) 20-25 MG per tablet TAKE 1 TABLET BY MOUTH ONE TIME A DAY 21  Yes Joseph Smyth MD   vitamin D (ERGOCALCIFEROL) 1.25 MG (39222 UT) CAPS capsule Take 1 capsule by mouth once a week 3/21/21  Yes Marla Mathis MD   naproxen (NAPROSYN) 250 MG tablet Take 250 mg by mouth daily as needed for Pain   Yes Historical Provider, MD       Current medications:    Current Facility-Administered Medications   Medication Dose Route Frequency Provider Last Rate Last Admin    0.9 % sodium chloride infusion   Intravenous Continuous Naveen Ivy MD        sodium chloride flush 0.9 % injection 10 mL  10 mL Intravenous 2 times per day Naveen Ivy MD        sodium chloride flush 0.9 % injection 10 mL  10 mL Intravenous PRN Naveen Ivy MD        0.9 % sodium chloride infusion  25 mL Intravenous PRN Naveen Ivy MD        ceFAZolin (ANCEF) 3000 mg in dextrose 5 % 100 mL IVPB  3,000 mg Intravenous Once Lan Swartz MD           Allergies:  No Known Allergies    Problem List:    Patient Active Problem List   Diagnosis Code    Benign essential HTN I10    Combined hyperlipidemia E78.2    Arthritis of knee, right M17.11    Former smoker Z87.891    Morbid obesity with BMI of 40.0-44.9, adult ([facility] [address] E66.01, Z68.41    COVID-19 virus infection U07.1    Lipoma of right upper extremity D17.21       Past Medical History: Diagnosis Date    Arthritis of knee, right     Benign essential HTN     Breast CA ([facility] [address]     right--breast---radiation and tamoxifen    Combined hyperlipidemia     Morbid obesity with BMI of 40.0-44.9, adult ([facility] [address]     Morbid obesity with BMI of 40.0-44.9, adult ([facility] [address]     Wears glasses        Past Surgical History:        Procedure Laterality Date    ARM SURGERY Left 2020    LEFT ULNAR NERVE DECOMPRESSION (AT ELBOW) AND LEFT CARPAL TUNNEL RELEASE performed by Aminata Guerrero MD at [facility] Right 2009    HYSTERECTOMY  2017    SHANNAN AND BSO (CERVIX REMOVED)    JOINT REPLACEMENT Left 2019    total knee replacement [facility]    KNEE SURGERY Left     reconstruction and 1 knee acl tear x 6    ULNAR TUNNEL RELEASE         Social History:    Social History     Tobacco Use    Smoking status: Former Smoker     Packs/day: 1.50     Years: 30.00     Pack years: 45.00     Types: Cigarettes     Start date: 1982     Quit date: 3/27/2019     Years since quittin.3    Smokeless tobacco: Never Used    Tobacco comment: was up to 2ppd for 4 years slowly down   Substance Use Topics    Alcohol use: Yes     Comment: socially                                Counseling given: Not Answered  Comment: was up to 2ppd for 4 years slowly down      Vital Signs (Current):   Vitals:    21 0952 21 0633   Weight: 285 lb (129.3 kg) 285 lb (129.3 kg)   Height: 5' 8.5\" (1.74 m)                                               BP Readings from Last 3 Encounters:   07/15/21 132/80   21 122/72   21 130/75       NPO Status: Time of last liquid consumption:                         Time of last solid consumption:                         Date of last liquid consumption: 21                        Date of last solid food consumption: 21    BMI:   Wt Readings from Last 3 Encounters:   21 285 lb (129.3 kg)   07/15/21 290 lb (131.5 kg)   21 290 lb 6.4 oz (131.7 kg)     Body mass index is 42.7 kg/m². CBC:   Lab Results   Component Value Date    WBC 9.3 07/28/2020    RBC 4.91 07/28/2020    HGB 14.4 07/28/2020    HCT 43.6 07/28/2020    MCV 88.6 07/28/2020    RDW 14.7 07/28/2020     07/28/2020       CMP:   Lab Results   Component Value Date     07/28/2020    K 4.6 07/28/2020    CL 96 07/28/2020    CO2 25 07/28/2020    BUN 10 07/28/2020    CREATININE 0.7 07/28/2020    GFRAA >60 07/28/2020    GFRAA >60 11/12/2010    AGRATIO 1.7 07/28/2020    LABGLOM >60 07/28/2020    GLUCOSE 98 07/28/2020    PROT 6.9 07/28/2020    PROT 6.9 09/08/2010    CALCIUM 9.6 07/28/2020    BILITOT 0.5 07/28/2020    ALKPHOS 98 07/28/2020    AST 21 07/28/2020    ALT 26 07/28/2020       POC Tests: No results for input(s): POCGLU, POCNA, POCK, POCCL, POCBUN, POCHEMO, POCHCT in the last 72 hours.     Coags:   Lab Results   Component Value Date    PROTIME 12.7 03/14/2019    INR 0.9 03/14/2019       HCG (If Applicable): No results found for: PREGTESTUR, PREGSERUM, HCG, HCGQUANT     ABGs: No results found for: PHART, PO2ART, XLG7JWP, KLD0UWQ, BEART, C1BDRPWA     Type & Screen (If Applicable):  Lab Results   Component Value Date    LABABO B  POSITIVE   03/14/2019       Drug/Infectious Status (If Applicable):  No results found for: HIV, HEPCAB    COVID-19 Screening (If Applicable):   Lab Results   Component Value Date    COVID19 Not Detected 11/13/2020           Anesthesia Evaluation  Patient summary reviewed and Nursing notes reviewed  Airway: Mallampati: II  TM distance: <3 FB   Neck ROM: full  Mouth opening: > = 3 FB Dental: normal exam         Pulmonary: breath sounds clear to auscultation                             Cardiovascular:  Exercise tolerance: good (>4 METS),   (+) hypertension:,       ECG reviewed  Rhythm: regular  Rate: normal                    Neuro/Psych:               GI/Hepatic/Renal:   (+) morbid obesity          Endo/Other:                     Abdominal: Vascular: Other Findings:           Anesthesia Plan      MAC     ASA 3       Induction: intravenous. Plan discussed with CRNA.     Attending anesthesiologist reviewed and agrees with Preprocedure content              Obie Bauman MD   7/23/2021

## 2021-07-23 NOTE — PROGRESS NOTES
CLINICAL PHARMACY NOTE: MEDS TO BEDS    Total # of Prescriptions Filled: 1   The following medications were delivered to the patient:  Discharge Medication List as of 7/23/2021  8:33 AM      START taking these medications    Details   HYDROcodone-acetaminophen (NORCO) 5-325 MG per tablet Take 1 tablet by mouth every 6 hours as needed for Pain for up to 3 days. Intended supply: 3 days.  Take lowest dose possible to manage pain, Disp-12 tablet, R-0Print         ·   ·     Additional Documentation:

## 2021-07-31 ENCOUNTER — TELEMEDICINE (OUTPATIENT)
Dept: BARIATRICS/WEIGHT MGMT | Age: 51
End: 2021-07-31
Payer: COMMERCIAL

## 2021-07-31 DIAGNOSIS — Z71.3 DIETARY COUNSELING AND SURVEILLANCE: ICD-10-CM

## 2021-07-31 DIAGNOSIS — E66.01 MORBID OBESITY WITH BMI OF 40.0-44.9, ADULT (HCC): Primary | ICD-10-CM

## 2021-07-31 PROCEDURE — 99213 OFFICE O/P EST LOW 20 MIN: CPT | Performed by: FAMILY MEDICINE

## 2021-07-31 RX ORDER — NALTREXONE HYDROCHLORIDE AND BUPROPION HYDROCHLORIDE 8; 90 MG/1; MG/1
2 TABLET, EXTENDED RELEASE ORAL 2 TIMES DAILY
Qty: 120 TABLET | Refills: 0 | Status: SHIPPED | OUTPATIENT
Start: 2021-07-31 | End: 2021-08-27

## 2021-07-31 ASSESSMENT — ENCOUNTER SYMPTOMS
VOMITING: 0
BLOOD IN STOOL: 0
NAUSEA: 0
CHOKING: 0
CONSTIPATION: 0
DIARRHEA: 0
ABDOMINAL PAIN: 0
SHORTNESS OF BREATH: 0
COUGH: 0
APNEA: 0
WHEEZING: 0
CHEST TIGHTNESS: 0
ABDOMINAL DISTENTION: 0
EYE PAIN: 0
PHOTOPHOBIA: 0

## 2021-07-31 NOTE — PROGRESS NOTES
Patient: Balta Leigh                      Encounter Date: 7/31/2021    YOB: 1970                Age: 46 y.o. Chief Complaint   Patient presents with    Weight Management     F/u Contrave          Patient identification was verified at the start of the visit. Patient-Reported Vitals 12/9/2020   Patient-Reported Weight 284.0lbs   Patient-Reported Height 5 9.5   Patient-Reported Temperature 97.8         BP Readings from Last 1 Encounters:   07/23/21 102/69       BMI Readings from Last 1 Encounters:   07/23/21 42.70 kg/m²       Pulse Readings from Last 1 Encounters:   07/23/21 68     Wt Readings from Last 3 Encounters:   07/23/21 285 lb (129.3 kg)   07/15/21 290 lb (131.5 kg)   07/12/21 290 lb 6.4 oz (131.7 kg)       Self-reported weight: 282 pounds     HPI: 46 y.o. female with a long-standing history of obesity presents today for virtual video follow-up. she has lost 4 pounds since her last visit. Current treatment includes Contrave and low carb/vernell diet. Tolerating it well. Food recall reviewed with the patient. Making better dietary choices. Motivated to continue losing weight. Medication(s): Appetite well controlled? [x]Yes      []No    Focus:     [x]Good     []Fair     []Poor    Side effects? No        Any recent change in medication(s)?  No    Exercise: []Cardio     []Resistance/strength training     [x]Other: Walking track    No Known Allergies      Current Outpatient Medications:     naltrexone-buPROPion (CONTRAVE) 8-90 MG per extended release tablet, Take 2 tablets by mouth 2 times daily, Disp: 120 tablet, Rfl: 0    lisinopril-hydroCHLOROthiazide (PRINZIDE;ZESTORETIC) 20-25 MG per tablet, TAKE 1 TABLET BY MOUTH ONE TIME A DAY, Disp: 90 tablet, Rfl: 0    vitamin D (ERGOCALCIFEROL) 1.25 MG (54014 UT) CAPS capsule, Take 1 capsule by mouth once a week, Disp: 8 capsule, Rfl: 0    naproxen (NAPROSYN) 250 MG tablet, Take 250 mg by mouth daily as needed for Pain, Disp: , Rfl: Patient Active Problem List   Diagnosis    Benign essential HTN    Combined hyperlipidemia    Arthritis of knee, right    Former smoker    Morbid obesity with BMI of 40.0-44.9, adult ([address]    COVID-19 virus infection    Lipoma of right upper extremity       Review of Systems   Constitutional: Negative for fatigue. Eyes: Negative for photophobia, pain and visual disturbance. Respiratory: Negative for apnea, cough, choking, chest tightness, shortness of breath and wheezing. Cardiovascular: Negative for chest pain, palpitations and leg swelling. Gastrointestinal: Negative for abdominal distention, abdominal pain, blood in stool, constipation, diarrhea, nausea and vomiting. Endocrine: Negative for cold intolerance and heat intolerance. Musculoskeletal: Negative for arthralgias and myalgias. Skin: Negative for rash. Neurological: Negative for dizziness, tremors, syncope, weakness, numbness and headaches. Psychiatric/Behavioral: Negative for agitation, confusion, decreased concentration, dysphoric mood, hallucinations, sleep disturbance and suicidal ideas. The patient is not nervous/anxious and is not hyperactive. Physical Exam  Constitutional:       Appearance: She is well-developed. HENT:      Head: Normocephalic. Eyes:      Conjunctiva/sclera: Conjunctivae normal.   Abdominal:      General: Abdomen is protuberant. Musculoskeletal:         General: No swelling. Neurological:      Mental Status: She is alert and oriented to person, place, and time. Psychiatric:         Mood and Affect: Mood normal.         Behavior: Behavior normal.         Thought Content:  Thought content normal.         Judgment: Judgment normal.         Hospital Outpatient Visit on 03/19/2021   Component Date Value Ref Range Status    Ventricular Rate 03/19/2021 63  BPM Final    Atrial Rate 03/19/2021 63  BPM Final    P-R Interval 03/19/2021 160  ms Final    QRS Duration 03/19/2021 100  ms Final    Q-T Interval 03/19/2021 452  ms Final    QTc Calculation (Bazett) 03/19/2021 462  ms Final    P Axis 03/19/2021 46  degrees Final    R Axis 03/19/2021 -28  degrees Final    T Axis 03/19/2021 11  degrees Final    Diagnosis 03/19/2021 Normal sinus rhythmLeftward axisOtherwise normal ECGNo previous ECGs availableConfirmed by CORRIE BENZ, Constantine Javier ([id number]) on 3/19/2021 11:20:09 AM   Final         Assessment and Plan:  1. Morbid obesity with BMI of 40.0-44.9, adult ([facility] [address]  Improving. Met the 5% weight loss goal.  New goal set today. Continue current management. Reinforced low carb/vernell diet. Contrave refilled. Weigh-in at office before next visit. F/u in 4 weeks. 2. Dietary counseling and surveillance  Low carb/vernell meal plan. Nutrition Plan: [] LCHF/Ketogenic   [x] Modified low-calorie diet (low carb/low-vernell)               [] Low-calorie diet    [] Maintenance       []Other        Exercise: [x] Cardio     [x] Resistance/strength training                       [x] ACSM recommendations (150 minutes/week in active weight loss)               Behavior: [x] Motivational interviewing performed    [] Referralfor counseling                         [x] Discussed strategies to overcome habits/challenges for focus         [] Stress management   [x] Stimulus control         [] Sleep hygiene      Reviewed:  [x] Nutrition and the importance of regular protein intake  [x] Hidden carbohydrate sources  [x] Alcohol use  [x] Tobacco use   [x] Drug use- Denies  [x] Importance of exercise and reducing sedentary time  [x] Treatment consent- Patient understands and agrees with the treatment plan   [x] Proper use of medication and side effects      Controlled Substance Monitoring:    No flowsheet data found.        Patient denies any history of cardiovascular disease (e.g., CAD, stroke, arrhythmias, CHF, uncontrolled HTN), seizure disorder, MAOI use within the last 2 weeks, hyperthyroidism, glaucoma, agitated states, history of drug abuse, pregnancy, nursing, known hypersensitivity to the prescribing meds, history of pancreatitis, or personal or family history of thyroid medullary cancer. Treatment start date: 8/1/21  12 weeks: 11/1/21  Starting weight: 282 pounds   Goal: At least 5-10% (14-28 pounds)    Treatment start date: 3/23/21   Starting weight: 296 pounds   Total weight loss: 14 pounds   Goal: At least 14-28 pounds by 12 weeks of starting therapeutic dose 7/23/21- Goal Met     Key dietary points:    - Meats (preferably organic or grass fed) are great sources of protein and have no carbohydrates. - Recommend coconut, olive, avocado, or almond oils. - When buying dairy, choose regular or full fat options. - Choose vegetables that grow above ground as they are generally lower in carbohydrates and higher in fiber.  - Avoid starches such as bread, rice, potatoes, pasta and all sources of simple sugars (desserts, soda, breakfast cereals). - Choose beverages that are calorie and sugar free. Reminder regarding weight loss medications: You must be seen in office every 2-4 weeks to haveyour prescriptions refilled. If you are off of your medication for longer than 7 days, you will not be able to restart the medication for at least 6 months. Always call our office to report any side effects. Females, it is your responsibility to obtain negative pregnancy tests each month. No orders of the defined types were placed in this encounter. No follow-ups on file. Milagros Cobb is a 46 y.o. female being evaluated by a Virtual Visit (video visit) encounter to address concerns as mentioned above. A caregiver was present when appropriate. Due to this being a TeleHealth encounter (During WDJUJ-73 public health emergency), evaluation of the following organ systems was limited: Vitals/Constitutional/EENT/Resp/CV/GI//MS/Neuro/Skin/Heme-Lymph-Imm.   Pursuant to the emergency declaration under the [address] Emergencies Act, 1135 waiver authority and the Coronavirus Preparedness and Response Supplemental Appropriations Act, this Virtual Visit was conducted with patient's (and/or legal guardian's) consent, to reduce the patient's risk of exposure to COVID-19 and provide necessary medical care. The patient (and/or legal guardian) has also been advised to contact this office for worsening conditions or problems, and seek emergency medical treatment and/or call 911 if deemed necessary.

## 2021-08-02 ENCOUNTER — TELEPHONE (OUTPATIENT)
Dept: BARIATRICS/WEIGHT MGMT | Age: 51
End: 2021-08-02

## 2021-08-05 ENCOUNTER — OFFICE VISIT (OUTPATIENT)
Dept: ORTHOPEDIC SURGERY | Age: 51
End: 2021-08-05
Payer: COMMERCIAL

## 2021-08-05 VITALS — BODY MASS INDEX: 41.47 KG/M2 | HEIGHT: 69 IN | WEIGHT: 280 LBS

## 2021-08-05 DIAGNOSIS — Z96.652 STATUS POST TOTAL KNEE REPLACEMENT USING CEMENT, LEFT: ICD-10-CM

## 2021-08-05 DIAGNOSIS — M25.562 LEFT KNEE PAIN, UNSPECIFIED CHRONICITY: Primary | ICD-10-CM

## 2021-08-05 PROCEDURE — L1810 KO ELASTIC WITH JOINTS: HCPCS | Performed by: PHYSICIAN ASSISTANT

## 2021-08-05 PROCEDURE — 99214 OFFICE O/P EST MOD 30 MIN: CPT | Performed by: PHYSICIAN ASSISTANT

## 2021-08-06 NOTE — PROGRESS NOTES
Patient Name: Eusebio Panchal  : 1970  DOS: 2021        Chief Complaint:   Chief Complaint   Patient presents with    Knee Pain     LT KNEE-Prev TKA  DR BOO       History of Present Illness:  Eusebio Panchal is a 46 y.o. female who presents with a chief complaint of continued complaints of pain in the knee with irritation with walking, stairs and with overuse. Pain in the knee regularly with swelling and discomfort. Increase in pain over the past several years time. Patient notes she is here for evaluation of left knee pain status post total knee replacement in 2019. She notes no significant pain but significant irritability and discomfort and inability to perform stairs. She notes she has gone through rigorous physical therapy multiple times with no improvement she has been continuously working on her quadriceps strength all with limited improvement. She has not tried braces or assistive walking devices she holds on to railings which she has to take to and she does not give way of the knee and instability of the knee when she does. But she denies any fall trauma or injury denies numbness burning tingling radiating pains in the leg. She notes that the mild pain that she does have is controlled with Aleve. She is hopeful that we can do something to help control this ability and improve her ability to take stairs. Medical History  Current Medications:   Prior to Admission medications    Medication Sig Start Date End Date Taking?  Authorizing Provider   naltrexone-buPROPion (CONTRAVE) 8-90 MG per extended release tablet Take 2 tablets by mouth 2 times daily 21   Naye Montanez MD   lisinopril-hydroCHLOROthiazide (PRINZIDE;ZESTORETIC) 20-25 MG per tablet TAKE 1 TABLET BY MOUTH ONE TIME A DAY 21   Hilary August MD   vitamin D (ERGOCALCIFEROL) 1.25 MG (15883 UT) CAPS capsule Take 1 capsule by mouth once a week  Patient not taking: Reported on 2021 3/21/21   Naye Montanez MD   naproxen (NAPROSYN) 250 MG tablet Take 250 mg by mouth daily as needed for Pain    Historical Provider, MD     Allergies: No Known Allergies    Review of Systems:     Review of Systems ______  Constitutional: Negative for fever and diaphoresis. ____________  Respiratory: Negative for shortness of breath.  ________  Gastrointestinal: Negative for abdominal pain. ________  Musculoskeletal: Positive for joint pain. ____  Skin: Negative for itching. ____  Neurological: Negative for loss of consciousness.  ______________  All other systems reviewed and negative     Past Medical History:   Diagnosis Date    Arthritis of knee, right     Benign essential HTN     Breast CA ([facility]     right--breast---radiation and tamoxifen    Combined hyperlipidemia     Morbid obesity with BMI of 40.0-44.9, adult ([facility]     Morbid obesity with BMI of 40.0-44.9, adult ([facility]     Wears glasses      Family History   Problem Relation Age of Onset    Diabetes Sister     Hypertension Sister     Arthritis Sister     Pacemaker Mother     High Blood Pressure Mother     Hypertension Mother     Elevated Lipids Mother     Arthritis Mother     Heart Disease Father     High Blood Pressure Father     Diabetes Father     Hypertension Father     Elevated Lipids Father     Coronary Art Dis Father     Arthritis Father     Hypertension Brother      Social History     Socioeconomic History    Marital status:      Spouse name: Not on file    Number of children: 2    Years of education: Not on file    Highest education level: Not on file   Occupational History    Occupation:    Tobacco Use    Smoking status: Former Smoker     Packs/day: 1.50     Years: 30.00     Pack years: 45.00     Types: Cigarettes     Start date: 1982     Quit date: 3/27/2019     Years since quittin.3    Smokeless tobacco: Never Used    Tobacco comment: was up to 2ppd for 4 years slowly down   Vaping Use    Vaping Use: Never used Substance and Sexual Activity    Alcohol use: Yes     Comment: socially    Drug use: Never    Sexual activity: Not Currently   Other Topics Concern    Not on file   Social History Narrative    Not on file     Social Determinants of Health     Financial Resource Strain:     Difficulty of Paying Living Expenses:    Food Insecurity:     Worried About Running Out of Food in the Last Year:     [address] in the Last Year:    Transportation Needs:     Lack of Transportation (Medical):  Lack of Transportation (Non-Medical):    Physical Activity:     Days of Exercise per Week:     Minutes of Exercise per Session:    Stress:     Feeling of Stress :    Social Connections:     Frequency of Communication with Friends and Family:     Frequency of Social Gatherings with Friends and Family:     Attends Church Services:     Active Member of Clubs or Organizations:     Attends Club or Organization Meetings:     Marital Status:    Intimate Partner Violence:     Fear of Current or Ex-Partner:     Emotionally Abused:     Physically Abused:     Sexually Abused:          Physical Exam __  Constitutional: She is oriented to person, place, and time and well-developed, well-nourished, and in no distress. No distress. ____  HENT:   Head: Normocephalic and atraumatic. ____  Eyes: Conjunctivae are normal. ________  Cardiovascular: Intact distal pulses. ____  Pulmonary/Chest: Effort normal. ________________________  Neurological: She is alert and oriented to person, place, and time. ____  Skin: Skin is dry. She is not diaphoretic. ____  Psychiatric: Mood, affect and judgment normal. ______          Assessment   Vital Signs: There were no vitals filed for this visit. left Knee shows evidence for DJD with anterior posterior obvious pseudolaxity, pain with weight bearing, none antalgic gait and no palpable osteophytes. Inspection: Minimal anterior swelling. Swelling is present with minimal effusion. The posterior aspect of the knee does not appears to be full with no tenderness. There is no erythema, rash, or ecchymosis. Range of Motion:  Right 0-120 left 0-120     Pain with anterior posterior drawer testing no significant pain noted with varus valgus    There is mild valgus deformity noted    Strength:  Hamstrings rated: 4/5. Quadriceps rated: 4/5    Palpation: There is minimal tenderness along the medial or lateral joint line. Special Tests: Patellar Compression test is positive. Valgus & Varus test is positive. Skin: There are no rashes, ulcerations or lesions. Gait: Gait pattern is antalgic  Skin shows no rashes/ecchymosis to the affected area, no hyperesthesias, no discoloration, no temperature or color discrepancies. NEUROLOGICALLY: There is no evidence for sensory or motor deficits in the extremity. Coordination appears full with no spacticity or rigidity. Reflexes appear to be symmetric. Distal circulation intact. No signs of RSD. Additional Comments: Hip range of motion intact bilaterally    Mid flexion laxity noted on examination fairly significant    Procedure(s): No procedure performed at today's date    Diagnostic Test Findings:   Xray   Have reviewed the xrays above from 08/05/21   Three-view x-ray of the left knee AP, lateral and sunrise views were performed and reviewed today revealing well-placed total knee prosthesis no evidence of loosening or fracture. Assessment and Plan:       Diagnosis Orders   1. Left knee pain, unspecified chronicity  XR KNEE LEFT (3 VIEWS)    Ambulatory referral to Physical Therapy    Breg / DJO Economy Hinged Knee Brace   2.  Status post total knee replacement using cement, left  Ambulatory referral to Physical Therapy    Breg / DJO Economy Hinged Knee Brace              The orders below, if any, were placed during this visit:   Orders Placed This Encounter   Procedures    XR KNEE LEFT (3 VIEWS)     Standing Status:   Future     Number of Occurrences:   1     Standing Expiration Date:   9/5/2021     Order Specific Question:   Reason for exam:     Answer:   Pain    Ambulatory referral to Physical Therapy     Referral Priority:   Routine     Referral Type:   Eval and Treat     Requested Specialty:   Physical Therapy     Number of Visits Requested:   1    Bravo / Hyla Frankel     Patient was prescribed an Economy Hinged Knee Brace. The left knee will require stabilization / immobilization from this semi-rigid / rigid orthosis to improve their function. The orthosis will assist in protecting the affected area, provide functional support and facilitate healing. The patient was educated and fit by a healthcare professional with expert knowledge and specialization in brace application while under the direct supervision of the treating physician. Verbal and written instructions for the use of and application of this item were provided. They were instructed to contact the office immediately should the brace result in increased pain, decreased sensation, increased swelling or worsening of the condition.               Treatment Plan:   -Place patient into djo economy hinged knee brace for stabilization and trialed on stairs patient noted exceptional relief and mid flexion laxity  -Gave patient order for physical therapy for evaluation treatment with BFR therapy with Liane protocol for continued quadriceps strengthening  -Patient is in need of neuromuscular stimulator due to quadriceps weakness and atrophy we will submit order for neuromuscular stimulator  -Advised patient follow-up in 3 to 6 weeks for reevaluation  -Did discuss the potential pursuance of revision knee arthroplasty with polyexchange however would like to pursue conservative management at present time to see if we can obtain better muscular control prior to moving forward with big surgical intervention.  -Spent 45 minutes with patient discussing current condition and future treatment management options.          Odella Snellen, PA

## 2021-08-09 NOTE — PLAN OF CARE
[address] [facility],[address], [address] [place], [address]     Phone: [phone]   Fax: [phone]                                                           Physical Therapy Certification    Dear Referring Practitioner: SOLOMON Martinez,    We had the pleasure of evaluating the following patient for physical therapy services at [address] [place] [address]. A summary of our findings can be found in the initial assessment below. This includes our plan of care. If you have any questions or concerns regarding these findings, please do not hesitate to contact me at the office phone number checked above. Thank you for the referral.       Physician Signature:_______________________________Date:__________________  By signing above (or electronic signature), therapists plan is approved by physician      Patient: Sima Lopez   : 1970   MRN: [id number]  Referring Physician: Referring Practitioner: Julissa Rivas, Aldair Puri      Evaluation Date: 8/10/2021      Medical Diagnosis Information:  Diagnosis: M25.562 (ICD-10-CM) - Left knee pain, unspecified chronicity   Treatment Diagnosis: M25.562 (ICD-10-CM) - Left knee pain, unspecified chronicity                                           Precautions/ Contra-indications: s/p TKA L   Latex Allergy:  [x]NO      []YES  Preferred Language for Healthcare:   [x]English       []Other:    C-SSRS Triggered by Intake questionnaire (Past 2 wk assessment):   [x] No, Questionnaire did not trigger screening.   [] Yes, Patient intake triggered further evaluation      [] C-SSRS Screening completed  [] PCP notified via Plan of Care  [] Emergency services notified         SUBJECTIVE:   Per provider note : Sima Lopez is a 46 y.o. female who presents with a chief complaint of continued complaints of pain in the knee with irritation with walking, stairs and with overuse.  Pain in the knee regularly with swelling and discomfort.   Increase in pain over the past several years time. Patient notes she is here for evaluation of left knee pain status post total knee replacement in 2019. She notes no significant pain but significant irritability and discomfort and inability to perform stairs. She notes she has gone through rigorous physical therapy multiple times with no improvement she has been continuously working on her quadriceps strength all with limited improvement. She has not tried braces or assistive walking devices she holds on to railings which she has to take to and she does not give way of the knee and instability of the knee when she does. But she denies any fall trauma or injury denies numbness burning tingling radiating pains in the leg. She notes that the mild pain that she does have is controlled with Aleve. She is hopeful that we can do something to help control this ability and improve her ability to take stairs      Today: Since TKA in 2019 she has had difficulty stairs going up and down, has to go nonreciprocally. Pt was given hinged knee brace on 8/5. She does note that with use of brace she is better up to go up and down steps normally without feeling like knee was going to buckle/give out. She also notes that she will have buckling while walking intermittently. She has continued to do HEP that she was given. They tried estim when she was in PT initially but she does not have a home unit. Relevant Medical History: TKA L in march 2019  Functional Disability Index: WOMAC    Pain Scale: 2-3/10  Easing factors:   Provocative factors: stairs, walking      Type: []Constant   []Intermittent  []Radiating []Localized []other:     Numbness/Tingling: pt does report n/t in anterior L thigh if she has been standing for long periods of time.      Functional Limitations/Impairments: []Sitting []Standing [x]Walking    []Squatting/bending  [x]Stairs           []ADL's  []Transfers []Sports/Recreation (M81.8)  []Osteopenia (M85.8)   Endocrine conditions   []Hypothyroid (E03.9)  []Hyperthyroid Gastrointestinal conditions   []Constipation (V38.78)   Metabolic conditions   []Morbid obesity (E66.01)  []Diabetes type 1(E10.65) or 2 (E11.65)   []Neuropathy (G60.9)     Pulmonary conditions   []Asthma (J45)  []Coughing   []COPD (J44.9)   Psychological Disorders  []Anxiety (F41.9)  []Depression (F32.9)   []Other:   []Other:          Barriers to/and or personal factors that will affect rehab potential:              []Age  []Sex              []Motivation/Lack of Motivation                        []Co-Morbidities              []Cognitive Function, education/learning barriers              []Environmental, home barriers              []profession/work barriers  [x]past PT/medical experience  []other:      Falls Risk Assessment (30 days):   [x] Falls Risk assessed and no intervention required. [] Falls Risk assessed and Patient requires intervention due to being higher risk   TUG score (>12s at risk):     [] Falls education provided, including           Functional Assessment:   Functional Assessment scale used:  WoMAC  Score: 24%    ASSESSMENT:   Functional Impairments:     []Noted lumbar/proximal hip/LE joint hypomobility   []Decreased LE functional ROM   []Decreased core/proximal hip strength and neuromuscular control   [x]Decreased LE functional strength   [x]Reduced balance/proprioceptive control   []other:      Functional Activity Limitations (from functional questionnaire and intake)   []Reduced ability to tolerate prolonged functional positions   [x]Reduced ability or difficulty with changes of positions or transfers between positions   []Reduced ability to maintain good posture and demonstrate good body mechanics with sitting, bending, and lifting   []Reduced ability to sleep   [] Reduced ability or tolerance with driving and/or computer work   []Reduced ability to perform lifting, carrying tasks   [x]Reduced ability to squat   []Reduced ability to forward bend   [x]Reduced ability to ambulate prolonged functional periods/distances/surfaces   [x]Reduced ability to ascend/descend stairs   []Reduced ability to run, hop, cut or jump   []other:    Participation Restrictions   [x]Reduced participation in self care activities   [x]Reduced participation in home management activities   []Reduced participation in work activities   [x]Reduced participation in social activities. [x]Reduced participation in sport/recreation activities. Classification :    [x]Signs/symptoms consistent with post-surgical status including decreased ROM, strength and function. []Signs/symptoms consistent with joint sprain/strain   []Signs/symptoms consistent with patella-femoral syndrome   []Signs/symptoms consistent with knee OA/hip OA   []Signs/symptoms consistent with internal derangement of knee/Hip   [x]Signs/symptoms consistent with functional  weakness/NMR control      []Signs/symptoms consistent with tendinitis/tendinosis    []signs/symptoms consistent with pathology which may benefit from Dry needling      []other:      Prognosis/Rehab Potential:      []Excellent   [x]Good    []Fair   []Poor    Tolerance of evaluation/treatment:    []Excellent   [x]Good    []Fair   []Poor    PLAN  Frequency/Duration:  1-2 days per week for 6-8 Weeks:  Interventions:  [x]  Therapeutic exercise including: strength training, ROM, for Lower extremity and core   [x]  NMR activation and proprioception for LE, Glutes and Core   [x]  Manual therapy as indicated for LE, Hip and spine to include: Dry Needling/IASTM, STM, PROM, Gr I-IV mobilizations, manipulation. [x] Modalities as needed that may include: thermal agents, E-stim, Biofeedback, US, iontophoresis as indicated  [x] Patient education on joint protection, postural re-education, activity modification, progression of HEP.      HEP instruction: The patient's home exercise program was reviewed and they were educated on appropriate frequency, duration and intensity. The enclosed activities were performed and new exercises were added to H.E. P. with detailed pictures included. (see scanned forms)    GOALS:  Patient stated goal: use stairs normally, stop buckling from happening   [] Progressing: [] Met: [] Not Met: [] Adjusted    Therapist goals for Patient:   Short Term Goals: To be achieved in: 2 weeks  1. Independent in HEP and progression per patient tolerance, in order to prevent re-injury. [] Progressing: [] Met: [] Not Met: [] Adjusted   2. Patient will have a decrease in pain to facilitate improvement in movement, function, and ADLs as indicated by Functional Deficits. [] Progressing: [] Met: [] Not Met: [] Adjusted    Long Term Goals: To be achieved in: 6-8 weeks  1. Disability index score of 12% or less for the [facility] to assist with reaching prior level of function. [] Progressing: [] Met: [] Not Met: [] Adjusted  2. Patient will demonstrate increased AROM to wfl painfree  to allow for proper joint functioning as indicated by patients Functional Deficits. [] Progressing: [] Met: [] Not Met: [] Adjusted  3. Patient will demonstrate an increase in Strength to within 10lbs of uninvolved side with handheld dynamometer to allow for proper functional mobility as indicated by patients Functional Deficits. [] Progressing: [] Met: [] Not Met: [] Adjusted  4. Patient will return to reciprocal gait on stairs without increased symptoms or restriction.    [] Progressing: [] Met: [] Not Met: [] Adjusted        Physical Therapy Evaluation Complexity Justification  [] A history of present problem with:  [] no personal factors and/or comorbidities that impact the plan of care;  [x]1-2 personal factors and/or comorbidities that impact the plan of care  []3 personal factors and/or comorbidities that impact the plan of care  [] An examination of body systems using standardized tests and measures addressing any of the following: body structures and functions (impairments), activity limitations, and/or participation restrictions;:  [] a total of 1-2 or more elements   [x] a total of 3 or more elements   [] a total of 4 or more elements   [] A clinical presentation with:  [x] stable and/or uncomplicated characteristics   [] evolving clinical presentation with changing characteristics  [] unstable and unpredictable characteristics;   [] Clinical decision making of [] low, [] moderate, [] high complexity using standardized patient assessment instrument and/or measurable assessment of functional outcome.     [x] EVAL (LOW) 07831 (typically 20 minutes face-to-face)  [] EVAL (MOD) 84080 (typically 30 minutes face-to-face)  [] EVAL (HIGH) 49995 (typically 45 minutes face-to-face)  [] RE-EVAL 05335  Electronically signed by:    Leonel Malhotra, PT, DPT, Cert DN

## 2021-08-10 ENCOUNTER — HOSPITAL ENCOUNTER (OUTPATIENT)
Dept: PHYSICAL THERAPY | Age: 51
Setting detail: THERAPIES SERIES
Discharge: HOME | End: 2021-08-10
Payer: COMMERCIAL

## 2021-08-10 PROCEDURE — 97161 PT EVAL LOW COMPLEX 20 MIN: CPT

## 2021-08-10 PROCEDURE — 97110 THERAPEUTIC EXERCISES: CPT

## 2021-08-10 PROCEDURE — 97112 NEUROMUSCULAR REEDUCATION: CPT

## 2021-08-10 NOTE — FLOWSHEET NOTE
[address] [facility], [place]                                                         Physical Therapy Daily Treatment Note  Date:  8/10/2021    Patient Name:  Dionisio Dasilva    :  1970  MRN: [id number]    Medical/Treatment Diagnosis Information:  · Diagnosis: M25.562 (ICD-10-CM) - Left knee pain, unspecified chronicity  · Treatment Diagnosis: M25.562 (ICD-10-CM) - Left knee pain, unspecified chronicity  Insurance/Certification information:  PT Insurance Information: [employer] [place]  Physician Information:  Referring Practitioner:  SOLOMON Rea  Has the plan of care been signed (Y/N):        []  Yes  [x]  No     Date of Patient follow up with Physician:       Is this a Progress Report:     []  Yes  [x]  No       Progress report will be due (10 Rx or 30 days whichever is less):       Recertification will be due (POC Duration  / 90 days whichever is less):          Visit # Insurance Allowable Auth Required   1 MN  []  Yes []  No        Functional Scale: WOMAC 24%     Date assessed:  8/10/21      Latex Allergy:  [x]NO      []YES  Preferred Language for Healthcare:   [x]English       []other:    Pain level:  0-2/10     SUBJECTIVE:  See eval    OBJECTIVE: 8/10/21   Observation:    Test measurements:      Girth  RIGHT LEFT   10cm superior to patella 52 cm with quad set  47.5 cm w/ quad set   Midline patella       10cm inferior to patella               L R Comment   Hamstring         Gastroc         ITB         Quad                                   ROM PROM AROM Overpressure Comment     L R L R L R     Flexion     126 125         Extension     lacking 2; poor quad set  0                                                   Strength L R Comment   Quad 33.3 60.7  lbs with hand held dynamometer   Hamstring 55.0 61.0     Gastroc         Hip flexor         Hip ABD                                     RESTRICTIONS/PRECAUTIONS: core control with self care, mobility, lifting, ambulation.  [] (26758) Provided verbal/tactile cueing for activities related to improving balance, coordination, kinesthetic sense, posture, motor skill, proprioception  to assist with LE, proximal hip, and core control in self care, mobility, lifting, ambulation and eccentric single leg control. NMR and Therapeutic Activities:    [] (24194 or 40478) Provided verbal/tactile cueing for activities related to improving balance, coordination, kinesthetic sense, posture, motor skill, proprioception and motor activation to allow for proper function of core, proximal hip and LE with self care and ADLs  [] (53040) Gait Re-education- Provided training and instruction to the patient for proper LE, core and proximal hip recruitment and positioning and eccentric body weight control with ambulation re-education including up and down stairs     Home Exercise Program:    [x] (20779) Reviewed/Progressed HEP activities related to strengthening, flexibility, endurance, ROM of core, proximal hip and LE for functional self-care, mobility, lifting and ambulation/stair navigation   [] (54899)Reviewed/Progressed HEP activities related to improving balance, coordination, kinesthetic sense, posture, motor skill, proprioception of core, proximal hip and LE for self care, mobility, lifting, and ambulation/stair navigation      Manual Treatments:  PROM / STM / Oscillations-Mobs:  G-I, II, III, IV (PA's, Inf., Post.)  [] (91368) Provided manual therapy to mobilize LE, proximal hip and/or LS spine soft tissue/joints for the purpose of modulating pain, promoting relaxation,  increasing ROM, reducing/eliminating soft tissue swelling/inflammation/restriction, improving soft tissue extensibility and allowing for proper ROM for normal function with self care, mobility, lifting and ambulation.      Modalities:      Charges:  Timed Code Treatment Minutes: 29   Total Treatment Minutes: 45     [x] ROCIO (LOW) 44046   [] EVAL (MOD) 21826   [] EVAL (HIGH) 31504   [] RE-EVAL   [x] AO(65645) x  1   [] IONTO  [x] NMR (45136) x  1   [] VASO  [] Manual (19616) x      [] Other:  [] TA x      [] Mech Traction (61791)  [] ES(attended) (70111)      [] ES (un) (88562):       GOALS:  Patient stated goal: use stairs normally, stop buckling from happening   [] Progressing: [] Met: [] Not Met: [] Adjusted    Therapist goals for Patient:   Short Term Goals: To be achieved in: 2 weeks  1. Independent in HEP and progression per patient tolerance, in order to prevent re-injury. [] Progressing: [] Met: [] Not Met: [] Adjusted   2. Patient will have a decrease in pain to facilitate improvement in movement, function, and ADLs as indicated by Functional Deficits. [] Progressing: [] Met: [] Not Met: [] Adjusted    Long Term Goals: To be achieved in: 6-8 weeks  1. Disability index score of 12% or less for the [facility] to assist with reaching prior level of function. [] Progressing: [] Met: [] Not Met: [] Adjusted  2. Patient will demonstrate increased AROM to wfl painfree  to allow for proper joint functioning as indicated by patients Functional Deficits. [] Progressing: [] Met: [] Not Met: [] Adjusted  3. Patient will demonstrate an increase in Strength to within 10lbs of uninvolved side with handheld dynamometer to allow for proper functional mobility as indicated by patients Functional Deficits. [] Progressing: [] Met: [] Not Met: [] Adjusted  4. Patient will return to reciprocal gait on stairs without increased symptoms or restriction. [] Progressing: [] Met: [] Not Met: [] Adjusted    Overall Progression Towards Functional goals/ Treatment Progress Update:  [] Patient is progressing as expected towards functional goals listed. [] Progression is slowed due to complexities/Impairments listed. [] Progression has been slowed due to co-morbidities.   [x] Plan just implemented, too soon to assess goals progression <30days [] Goals require adjustment due to lack of progress  [] Patient is not progressing as expected and requires additional follow up with physician  [] Other    Prognosis for POC: [x] Good [] Fair  [] Poor      Patient requires continued skilled intervention: [x] Yes  [] No    Treatment/Activity Tolerance:  [x] Patient able to complete treatment  [] Patient limited by fatigue  [] Patient limited by pain     [] Patient limited by other medical complications  [] Other: pt exhibits a 6 cm difference in quad girth with quad set between sides with poor voluntary quad contraction on LLE. Pt would benefit from home stim unit in order to improve voluntary quad contraction and improve quad size. Return to Play: (if applicable)   []  Stage 1: Intro to Strength   []  Stage 2: Return to Run and Strength   []  Stage 3: Return to Jump and Strength   []  Stage 4: Dynamic Strength and Agility   []  Stage 5: Sport Specific Training     []  Ready to Return to Play, Meets All Above Stages   []  Not Ready for Return to Sports   Comments:                               PLAN: See eval; consider BFR training when pt has improved quad contraction  [] Continue per plan of care [] Alter current plan (see comments above)  [x] Plan of care initiated [] Hold pending MD visit [] Discharge  Note: If patient does not return for scheduled/ recommended follow up visits, this note will serve as a discharge from care along with most recent update on progress. Reviewed insurance benefits for physical therapy in an outpatient hospital based setting with the patient, including deductible  and allowable visit number.  Pt was informed of possible out of pocket costs, as well as, informed of other service options for continuing supervised sessions without required skilled PT intervention such as the cash based [employer] program.     Electronically signed by:   Denisse Sanderson, PT, DPT, Cert DN

## 2021-08-12 ENCOUNTER — HOSPITAL ENCOUNTER (OUTPATIENT)
Dept: PHYSICAL THERAPY | Age: 51
Setting detail: THERAPIES SERIES
Discharge: HOME | End: 2021-08-12
Payer: COMMERCIAL

## 2021-08-12 ENCOUNTER — OFFICE VISIT (OUTPATIENT)
Dept: SURGERY | Age: 51
End: 2021-08-12

## 2021-08-12 VITALS — SYSTOLIC BLOOD PRESSURE: 111 MMHG | WEIGHT: 280 LBS | BODY MASS INDEX: 41.35 KG/M2 | DIASTOLIC BLOOD PRESSURE: 85 MMHG

## 2021-08-12 DIAGNOSIS — D17.21 LIPOMA OF RIGHT UPPER EXTREMITY: Primary | ICD-10-CM

## 2021-08-12 PROCEDURE — 97112 NEUROMUSCULAR REEDUCATION: CPT

## 2021-08-12 PROCEDURE — 97110 THERAPEUTIC EXERCISES: CPT

## 2021-08-12 PROCEDURE — 99024 POSTOP FOLLOW-UP VISIT: CPT | Performed by: SURGERY

## 2021-08-12 PROCEDURE — 97530 THERAPEUTIC ACTIVITIES: CPT

## 2021-08-12 NOTE — PROGRESS NOTES
Subjective:      Patient ID: Abhijeet Saravia is a 46 y.o. female. HPI  S/p excision R arm lipoma. Denies pain, drainage, other complaints. No apparent complications    Path  Soft tissue, right upper arm, excision:      - Mature adipose tissue, compatible with lipoma. Review of Systems    Objective:   Physical Exam  Wound healed  Assessment:       Diagnosis Orders   1.  Lipoma of right upper extremity             Plan:      Recovering well  Path discussed  No restrictions  Return ALISE Moss MD

## 2021-08-12 NOTE — FLOWSHEET NOTE
Strength L R Comment   Quad 33.3 60.7  lbs with hand held dynamometer   Hamstring 55.0 61.0     Gastroc         Hip flexor         Hip ABD                                     RESTRICTIONS/PRECAUTIONS:     Exercises/Interventions:     Exercise/Equipment Resistance/Repetitions Other comments   Stretching     Hamstring [id number]    Hip Flexion     ITB     Grion     Quad     Inclined Calf     Towel Pull          SLR     Supine x25 with estim assist     Prone     Abduction 2x10    Adducton     SLR+     Clams     LAQ 2x10               Isometrics     Quad sets 5 min VMS 10/10 duty cycle 43 mV   Hip Adduction     Hamstring                    Patellar Glides     Medial     Superior     Inferior          ROM     Sheet Pulls     Wall Slides     Edge of bed     Flexionator     Extensionator     Hang Weights     Ankle Pumps                              CKC     Calf raises     Wall sits     Step ups 3x10 L1    Step up and over     Lateral Step Downs               Mini squats     CC TKE  4 plates 3P74          Lateral band walks     Side Planks     Half moon     Single leg flow          Biodex-balance     Single leg stance [id number]    Plyoback          Stool scoots     SB bridge     SB HS Curls     Planks          PRE     Extension  RANGE: 90/40   Flexion  RANGE: 0/90        Cable Column          Leg Press  RANGE: 70/10        Bike     Treadmill                 Access Code: [email or web address]  URL: [email or web address]  Date: 08/10/2021  Prepared by: Josephine Adan    Exercises  Seated Table Hamstring Stretch - 1 x daily - 7 x weekly - 5 reps - 1 sets - 30 hold  Supine Active Straight Leg Raise - 1 x daily - 7 x weekly - 10 reps - 3 sets  Sidelying Hip Abduction - 1 x daily - 7 x weekly - 10 reps - 3 sets  Seated Long Arc Quad - 1 x daily - 7 x weekly - 3 sets - 10 reps  Single Leg Stance with Support - 1 x daily - 7 x weekly - 3 reps - 1 sets - 30 hold  Standing Terminal Knee Extension with Resistance - 1 x daily - 7 x weekly - 10 reps - 3 sets      Therapeutic Exercise and NMR EXR  [] (98191) Provided verbal/tactile cueing for activities related to strengthening, flexibility, endurance, ROM for improvements in LE, proximal hip, and core control with self care, mobility, lifting, ambulation.  [] (82925) Provided verbal/tactile cueing for activities related to improving balance, coordination, kinesthetic sense, posture, motor skill, proprioception  to assist with LE, proximal hip, and core control in self care, mobility, lifting, ambulation and eccentric single leg control.      NMR and Therapeutic Activities:    [] (85293 or 86378) Provided verbal/tactile cueing for activities related to improving balance, coordination, kinesthetic sense, posture, motor skill, proprioception and motor activation to allow for proper function of core, proximal hip and LE with self care and ADLs  [] (98821) Gait Re-education- Provided training and instruction to the patient for proper LE, core and proximal hip recruitment and positioning and eccentric body weight control with ambulation re-education including up and down stairs     Home Exercise Program:    [x] (74807) Reviewed/Progressed HEP activities related to strengthening, flexibility, endurance, ROM of core, proximal hip and LE for functional self-care, mobility, lifting and ambulation/stair navigation   [] (18430)Reviewed/Progressed HEP activities related to improving balance, coordination, kinesthetic sense, posture, motor skill, proprioception of core, proximal hip and LE for self care, mobility, lifting, and ambulation/stair navigation      Manual Treatments:  PROM / STM / Oscillations-Mobs:  G-I, II, III, IV (PA's, Inf., Post.)  [] (71016) Provided manual therapy to mobilize LE, proximal hip and/or LS spine soft tissue/joints for the purpose of modulating pain, promoting relaxation,  increasing ROM, reducing/eliminating soft tissue swelling/inflammation/restriction, improving soft tissue extensibility and allowing for proper ROM for normal function with self care, mobility, lifting and ambulation. Modalities:      Charges:  Timed Code Treatment Minutes: 43   Total Treatment Minutes: 45     [] EVAL (LOW) 14849   [] EVAL (MOD) 31886   [] EVAL (HIGH) 34110   [] RE-EVAL   [x] HD(21654) x  1   [] IONTO  [x] NMR (19815) x  1   [] VASO  [] Manual (28777) x      [] Other:  [x] TA x   1   [] Mech Traction (74331)  [] ES(attended) (27852)      [] ES (un) (91270):       GOALS:  Patient stated goal: use stairs normally, stop buckling from happening   [] Progressing: [] Met: [] Not Met: [] Adjusted    Therapist goals for Patient:   Short Term Goals: To be achieved in: 2 weeks  1. Independent in HEP and progression per patient tolerance, in order to prevent re-injury. [] Progressing: [] Met: [] Not Met: [] Adjusted   2. Patient will have a decrease in pain to facilitate improvement in movement, function, and ADLs as indicated by Functional Deficits. [] Progressing: [] Met: [] Not Met: [] Adjusted    Long Term Goals: To be achieved in: 6-8 weeks  1. Disability index score of 12% or less for the [facility] to assist with reaching prior level of function. [] Progressing: [] Met: [] Not Met: [] Adjusted  2. Patient will demonstrate increased AROM to wfl painfree  to allow for proper joint functioning as indicated by patients Functional Deficits. [] Progressing: [] Met: [] Not Met: [] Adjusted  3. Patient will demonstrate an increase in Strength to within 10lbs of uninvolved side with handheld dynamometer to allow for proper functional mobility as indicated by patients Functional Deficits. [] Progressing: [] Met: [] Not Met: [] Adjusted  4. Patient will return to reciprocal gait on stairs without increased symptoms or restriction.    [] Progressing: [] Met: [] Not Met: [] Adjusted    Overall Progression Towards Functional goals/ Treatment Progress Update:  [] Patient is progressing as expected towards functional goals listed. [] Progression is slowed due to complexities/Impairments listed. [] Progression has been slowed due to co-morbidities. [x] Plan just implemented, too soon to assess goals progression <30days   [] Goals require adjustment due to lack of progress  [] Patient is not progressing as expected and requires additional follow up with physician  [] Other    Prognosis for POC: [x] Good [] Fair  [] Poor      Patient requires continued skilled intervention: [x] Yes  [] No    Treatment/Activity Tolerance:  [x] Patient able to complete treatment  [] Patient limited by fatigue  [] Patient limited by pain     [] Patient limited by other medical complications  [] Other: pt shows improving control of quad with estim. She will bring her unit in next session if it arrives in time to trial. She was able to progress with step ups but does have difficulty with good control/mechanics. Continue to progress as tolerated. Return to Play: (if applicable)   []  Stage 1: Intro to Strength   []  Stage 2: Return to Run and Strength   []  Stage 3: Return to Jump and Strength   []  Stage 4: Dynamic Strength and Agility   []  Stage 5: Sport Specific Training     []  Ready to Return to Play, Meets All Above Stages   []  Not Ready for Return to Sports   Comments:                               PLAN: See eval; consider BFR training when pt has improved quad contraction  [] Continue per plan of care [] Alter current plan (see comments above)  [x] Plan of care initiated [] Hold pending MD visit [] Discharge  Note: If patient does not return for scheduled/ recommended follow up visits, this note will serve as a discharge from care along with most recent update on progress. Reviewed insurance benefits for physical therapy in an outpatient hospital based setting with the patient, including deductible  and allowable visit number.  Pt was informed of possible out of pocket costs, as well as, informed of other service options for continuing supervised sessions without required skilled PT intervention such as the [facility] program.     Electronically signed by:   Mary Beth Martin, PT, DPT, Cert DN

## 2021-08-17 ENCOUNTER — HOSPITAL ENCOUNTER (OUTPATIENT)
Dept: PHYSICAL THERAPY | Age: 51
Setting detail: THERAPIES SERIES
Discharge: HOME | End: 2021-08-17
Payer: COMMERCIAL

## 2021-08-17 PROCEDURE — 97112 NEUROMUSCULAR REEDUCATION: CPT

## 2021-08-17 PROCEDURE — 97530 THERAPEUTIC ACTIVITIES: CPT

## 2021-08-17 PROCEDURE — 97110 THERAPEUTIC EXERCISES: CPT

## 2021-08-17 NOTE — FLOWSHEET NOTE
[address] [facility], [place]                                                         Physical Therapy Daily Treatment Note  Date:  2021    Patient Name:  Romel Martin    :  1970  MRN: [id number]    Medical/Treatment Diagnosis Information:  · Diagnosis: M25.562 (ICD-10-CM) - Left knee pain, unspecified chronicity  · Treatment Diagnosis: M25.562 (ICD-10-CM) - Left knee pain, unspecified chronicity  Insurance/Certification information:  PT Insurance Information: [employer] [place]  Physician Information:  Referring Practitioner: SOLOMON Yao  Has the plan of care been signed (Y/N):        []  Yes  [x]  No     Date of Patient follow up with Physician:       Is this a Progress Report:     []  Yes  [x]  No       Progress report will be due (10 Rx or 30 days whichever is less):       Recertification will be due (POC Duration  / 90 days whichever is less):          Visit # Insurance Allowable Auth Required   3 [place]  []  Yes []  No        Functional Scale: WOMAC 24%     Date assessed:  8/10/21      Latex Allergy:  [x]NO      []YES  Preferred Language for Healthcare:   [x]English       []other:    Pain level:  0-2/10     SUBJECTIVE:  Pt notes she is doing well, she received her home stim unit and brought it in today to trial. She does feel like she is getting a bit stronger.      OBJECTIVE: 8/10/21   Observation:    Test measurements:      Girth  RIGHT LEFT   10cm superior to patella 52 cm with quad set  47.5 cm w/ quad set   Midline patella       10cm inferior to patella               L R Comment   Hamstring         Gastroc         ITB         Quad                                   ROM PROM AROM Overpressure Comment     L R L R L R     Flexion     126 125         Extension     lacking 2; poor quad set  0                                                   Strength L R Comment   Quad 33.3 60.7  lbs with hand held dynamometer   Hamstring 55.0 61.0     Gastroc         Hip flexor         Hip ABD                                     RESTRICTIONS/PRECAUTIONS:     Exercises/Interventions:     Exercise/Equipment Resistance/Repetitions Other comments   Stretching     Hamstring [id number]    Hip Flexion     ITB     Grion     Quad     Inclined Calf     Towel Pull          SLR     Supine x25 with estim assist     Prone     Abduction 2x10    Adducton     SLR+     Clams     LAQ 2x10               Isometrics     Quad sets 5 min VMS 10/10 duty cycle 43 mV   Hip Adduction     Hamstring                    Patellar Glides     Medial     Superior     Inferior          ROM     Sheet Pulls     Wall Slides     Edge of bed     Flexionator     Extensionator     Hang Weights     Ankle Pumps                              CKC     Calf raises     Wall sits [id number]    Step ups 3x10 L1    Step up and over     Lateral Step Downs               Mini squats     CC TKE  4 plates 1O69          Lateral band walks     Side Planks     Half moon     Single leg flow          Biodex-balance     Single leg stance [id number]    Plyoback          Stool scoots     SB bridge     SB HS Curls     Planks          PRE     Extension  RANGE: 90/40   Flexion  RANGE: 0/90        Cable Column          Leg Press  RANGE: 70/10        Bike 5 min ROM     Treadmill                 Access Code: [email or web address]  URL: [email or web address]  Date: 08/10/2021  Prepared by: Debra Kaur    Exercises  Seated Table Hamstring Stretch - 1 x daily - 7 x weekly - 5 reps - 1 sets - 30 hold  Supine Active Straight Leg Raise - 1 x daily - 7 x weekly - 10 reps - 3 sets  Sidelying Hip Abduction - 1 x daily - 7 x weekly - 10 reps - 3 sets  Seated Long Arc Quad - 1 x daily - 7 x weekly - 3 sets - 10 reps  Single Leg Stance with Support - 1 x daily - 7 x weekly - 3 reps - 1 sets - 30 hold  Standing Terminal Knee Extension with Resistance - 1 x daily - 7 x weekly - 10 reps - 3 sets      Therapeutic Exercise and NMR EXR  [] (57134) Provided verbal/tactile cueing for activities related to strengthening, flexibility, endurance, ROM for improvements in LE, proximal hip, and core control with self care, mobility, lifting, ambulation.  [] (31029) Provided verbal/tactile cueing for activities related to improving balance, coordination, kinesthetic sense, posture, motor skill, proprioception  to assist with LE, proximal hip, and core control in self care, mobility, lifting, ambulation and eccentric single leg control.      NMR and Therapeutic Activities:    [] (42747 or 59091) Provided verbal/tactile cueing for activities related to improving balance, coordination, kinesthetic sense, posture, motor skill, proprioception and motor activation to allow for proper function of core, proximal hip and LE with self care and ADLs  [] (35795) Gait Re-education- Provided training and instruction to the patient for proper LE, core and proximal hip recruitment and positioning and eccentric body weight control with ambulation re-education including up and down stairs     Home Exercise Program:    [x] (69694) Reviewed/Progressed HEP activities related to strengthening, flexibility, endurance, ROM of core, proximal hip and LE for functional self-care, mobility, lifting and ambulation/stair navigation   [] (02300)Reviewed/Progressed HEP activities related to improving balance, coordination, kinesthetic sense, posture, motor skill, proprioception of core, proximal hip and LE for self care, mobility, lifting, and ambulation/stair navigation      Manual Treatments:  PROM / STM / Oscillations-Mobs:  G-I, II, III, IV (PA's, Inf., Post.)  [] (56093) Provided manual therapy to mobilize LE, proximal hip and/or LS spine soft tissue/joints for the purpose of modulating pain, promoting relaxation,  increasing ROM, reducing/eliminating soft tissue swelling/inflammation/restriction, improving soft tissue extensibility and allowing for proper ROM for normal to complexities/Impairments listed. [] Progression has been slowed due to co-morbidities. [x] Plan just implemented, too soon to assess goals progression <30days   [] Goals require adjustment due to lack of progress  [] Patient is not progressing as expected and requires additional follow up with physician  [] Other    Prognosis for POC: [x] Good [] Fair  [] Poor      Patient requires continued skilled intervention: [x] Yes  [] No    Treatment/Activity Tolerance:  [x] Patient able to complete treatment  [] Patient limited by fatigue  [] Patient limited by pain     [] Patient limited by other medical complications  [] Other:  Pt able to use/operate home stim today without issue. She was instructed to use daily with quad sets and with SLR. She was able to progress with ck activities today as well. Continue to progress as tolerated. Return to Play: (if applicable)   []  Stage 1: Intro to Strength   []  Stage 2: Return to Run and Strength   []  Stage 3: Return to Jump and Strength   []  Stage 4: Dynamic Strength and Agility   []  Stage 5: Sport Specific Training     []  Ready to Return to Play, Meets All Above Stages   []  Not Ready for Return to Sports   Comments:                               PLAN: See eval; consider BFR training when pt has improved quad contraction  [] Continue per plan of care [] Alter current plan (see comments above)  [x] Plan of care initiated [] Hold pending MD visit [] Discharge  Note: If patient does not return for scheduled/ recommended follow up visits, this note will serve as a discharge from care along with most recent update on progress. Reviewed insurance benefits for physical therapy in an outpatient hospital based setting with the patient, including deductible  and allowable visit number.  Pt was informed of possible out of pocket costs, as well as, informed of other service options for continuing supervised sessions without required skilled PT intervention such as the cash based [employer] program.     Electronically signed by:   Denisse Sanderson, PT, DPT, Cert DN

## 2021-08-19 ENCOUNTER — HOSPITAL ENCOUNTER (OUTPATIENT)
Dept: PHYSICAL THERAPY | Age: 51
Setting detail: THERAPIES SERIES
Discharge: HOME | End: 2021-08-19
Payer: COMMERCIAL

## 2021-08-19 PROCEDURE — 97110 THERAPEUTIC EXERCISES: CPT

## 2021-08-19 PROCEDURE — 97112 NEUROMUSCULAR REEDUCATION: CPT

## 2021-08-19 PROCEDURE — 97530 THERAPEUTIC ACTIVITIES: CPT

## 2021-08-19 NOTE — FLOWSHEET NOTE
[address] [facility], [place]                                                         Physical Therapy Daily Treatment Note  Date:  2021    Patient Name:  Elías Acevedo    :  1970  MRN: [id number]    Medical/Treatment Diagnosis Information:  · Diagnosis: M25.562 (ICD-10-CM) - Left knee pain, unspecified chronicity  · Treatment Diagnosis: M25.562 (ICD-10-CM) - Left knee pain, unspecified chronicity  Insurance/Certification information:  PT Insurance Information: [employer]  Physician Information:  Referring Practitioner: SOLOMON Harper  Has the plan of care been signed (Y/N):        []  Yes  [x]  No     Date of Patient follow up with Physician:       Is this a Progress Report:     []  Yes  [x]  No       Progress report will be due (10 Rx or 30 days whichever is less):       Recertification will be due (POC Duration  / 90 days whichever is less):          Visit # Insurance Allowable Auth Required   4 [place]  []  Yes []  No        Functional Scale: WOMAC 24%     Date assessed:  8/10/21      Latex Allergy:  [x]NO      []YES  Preferred Language for Healthcare:   [x]English       []other:    Pain level:  0-2/10     SUBJECTIVE:  Pt notes she is doing well, she is still nervous going up/down stairs carrying laundry. She has been going up/down stairs using railing in the house without brace and feels less buckling but does still wear the brace when outside of the house.      OBJECTIVE: 8/10/21   Observation:    Test measurements:      Girth  RIGHT LEFT   10cm superior to patella 52 cm with quad set  47.5 cm w/ quad set   Midline patella       10cm inferior to patella               L R Comment   Hamstring         Gastroc         ITB         Quad                                   ROM PROM AROM Overpressure Comment     L R L R L R     Flexion     126 125         Extension     0 with quad set 21   0                                                   Strength L R Comment   Quad 33.3 60.7  lbs with hand held dynamometer   Hamstring 55.0 61.0     Gastroc         Hip flexor         Hip ABD                                     RESTRICTIONS/PRECAUTIONS:     Exercises/Interventions:     Exercise/Equipment Resistance/Repetitions Other comments   Stretching     Hamstring [id number]    Hip Flexion     ITB     Grion     Quad     Inclined Calf     Towel Pull          SLR     Supine 3x10     Prone     Abduction 3x10    Adducton     SLR+     Clams     LAQ 3x10               Isometrics     Quad sets  With BFR 8/19   Hip Adduction     Hamstring                    Patellar Glides     Medial     Superior     Inferior          ROM     Sheet Pulls     Wall Slides     Edge of bed     Flexionator     Extensionator     Hang Weights     Ankle Pumps                              CKC     Calf raises        Step ups 3x10 L1    Step up and over     Lateral Step Downs     rockerboard  [id number] mini squat hold          Mini squats     CC TKE  4 plates 7B02          Lateral band walks     Side Planks     Half moon     Single leg flow          Biodex-balance     Single leg stance [id number]    Plyoback          Stool scoots     SB bridge     SB HS Curls     Planks          PRE     Extension  RANGE: 90/40   Flexion  RANGE: 0/90        Cable Column          Leg Press  RANGE: 70/10        Bike 5 min ROM     Treadmill                 Access Code: [email or web address]  URL: [email or web address]  Date: 08/10/2021  Prepared by: Ana Khan    Exercises  Seated Table Hamstring Stretch - 1 x daily - 7 x weekly - 5 reps - 1 sets - 30 hold  Supine Active Straight Leg Raise - 1 x daily - 7 x weekly - 10 reps - 3 sets  Sidelying Hip Abduction - 1 x daily - 7 x weekly - 10 reps - 3 sets  Seated Long Arc Quad - 1 x daily - 7 x weekly - 3 sets - 10 reps  Single Leg Stance with Support - 1 x daily - 7 x weekly - 3 reps - 1 sets - 30 hold  Standing Terminal Knee Extension with Resistance - 1 x daily - 7 x weekly - 10 reps - 3 sets          Bloodflow restriction was utilized throughout exercise session with use of Janene Unit for a period of 8 minutes. The Cuff was deflated every 10 minutes for reperfusion during treatment. The pateint was monitored for parathesia as well as poor tolerance throughout the treatment session. BFR Smart Phase Protocol                    BFR Session 08/19/21                          1 min rest period between each set of repetitions. 2 Min rest/reperfusion between    each exercises protocol perfromed                      BFR Locations L LE  BFR set at: 130 mmHg                    Protocol: 30/15/15/15           Exercises:   Reps 30s Reps 30s Reps 30s Reps Notes                Quad set 10sec on /10 sec off  # of reps required  1:30 Rest 1:30 Rest 1:30 Rest 1:30     completed  1:30  1:30  1:30  1:30     reps required  30  15  15  15     completed             reps required  30  15  15  15     completed             reps required  30  15  15  15     completed                    Therapeutic Exercise and NMR EXR  [] (97578) Provided verbal/tactile cueing for activities related to strengthening, flexibility, endurance, ROM for improvements in LE, proximal hip, and core control with self care, mobility, lifting, ambulation.  [] (87960) Provided verbal/tactile cueing for activities related to improving balance, coordination, kinesthetic sense, posture, motor skill, proprioception  to assist with LE, proximal hip, and core control in self care, mobility, lifting, ambulation and eccentric single leg control.      NMR and Therapeutic Activities:    [] (14942 or 71632) Provided verbal/tactile cueing for activities related to improving balance, coordination, kinesthetic sense, posture, motor skill, proprioception and motor activation to allow for proper function of core, proximal hip and LE with self care and ADLs  [] (69475) Gait Re-education- Provided training and instruction to the patient for proper LE, core and proximal hip recruitment and positioning and eccentric body weight control with ambulation re-education including up and down stairs     Home Exercise Program:    [x] (47879) Reviewed/Progressed HEP activities related to strengthening, flexibility, endurance, ROM of core, proximal hip and LE for functional self-care, mobility, lifting and ambulation/stair navigation   [] (88840)Reviewed/Progressed HEP activities related to improving balance, coordination, kinesthetic sense, posture, motor skill, proprioception of core, proximal hip and LE for self care, mobility, lifting, and ambulation/stair navigation      Manual Treatments:  PROM / STM / Oscillations-Mobs:  G-I, II, III, IV (PA's, Inf., Post.)  [] (19689) Provided manual therapy to mobilize LE, proximal hip and/or LS spine soft tissue/joints for the purpose of modulating pain, promoting relaxation,  increasing ROM, reducing/eliminating soft tissue swelling/inflammation/restriction, improving soft tissue extensibility and allowing for proper ROM for normal function with self care, mobility, lifting and ambulation. Modalities:      Charges:  Timed Code Treatment Minutes: 54   Total Treatment Minutes: 54     [] EVAL (LOW) 05898   [] EVAL (MOD) 27502   [] EVAL (HIGH) 64261   [] RE-EVAL   [x] VS(79279) x  1   [] IONTO  [x] NMR (65068) x  2   [] VASO  [] Manual (02741) x      [] Other:  [x] TA x   1   [] Mech Traction (68899)  [] ES(attended) (90678)      [] ES (un) (90990):       GOALS:  Patient stated goal: use stairs normally, stop buckling from happening   [] Progressing: [] Met: [] Not Met: [] Adjusted    Therapist goals for Patient:   Short Term Goals: To be achieved in: 2 weeks  1. Independent in HEP and progression per patient tolerance, in order to prevent re-injury. [] Progressing: [] Met: [] Not Met: [] Adjusted   2.  Patient will have a decrease in pain to facilitate improvement in movement, function, and ADLs as indicated by Functional Deficits. [] Progressing: [] Met: [] Not Met: [] Adjusted    Long Term Goals: To be achieved in: 6-8 weeks  1. Disability index score of 12% or less for the [facility] to assist with reaching prior level of function. [] Progressing: [] Met: [] Not Met: [] Adjusted  2. Patient will demonstrate increased AROM to wfl painfree  to allow for proper joint functioning as indicated by patients Functional Deficits. [] Progressing: [] Met: [] Not Met: [] Adjusted  3. Patient will demonstrate an increase in Strength to within 10lbs of uninvolved side with handheld dynamometer to allow for proper functional mobility as indicated by patients Functional Deficits. [] Progressing: [] Met: [] Not Met: [] Adjusted  4. Patient will return to reciprocal gait on stairs without increased symptoms or restriction. [] Progressing: [] Met: [] Not Met: [] Adjusted    Overall Progression Towards Functional goals/ Treatment Progress Update:  [] Patient is progressing as expected towards functional goals listed. [] Progression is slowed due to complexities/Impairments listed. [] Progression has been slowed due to co-morbidities. [x] Plan just implemented, too soon to assess goals progression <30days   [] Goals require adjustment due to lack of progress  [] Patient is not progressing as expected and requires additional follow up with physician  [] Other    Prognosis for POC: [x] Good [] Fair  [] Poor      Patient requires continued skilled intervention: [x] Yes  [] No    Treatment/Activity Tolerance:  [x] Patient able to complete treatment  [] Patient limited by fatigue  [] Patient limited by pain     [] Patient limited by other medical complications  [] Other: introduced BFR today with quad sets, pt notes it is fatiguing but no pain. Pt still exhibits slight extensor lag with SLR but it is improving.  Attempted higher step ups today but pt compensated by leaning trunk forward so completed on lower step. Continue to progress as tolerated. Return to Play: (if applicable)   []  Stage 1: Intro to Strength   []  Stage 2: Return to Run and Strength   []  Stage 3: Return to Jump and Strength   []  Stage 4: Dynamic Strength and Agility   []  Stage 5: Sport Specific Training     []  Ready to Return to Play, Meets All Above Stages   []  Not Ready for Return to Sports   Comments:                               PLAN: See eval; consider BFR training when pt has improved quad contraction  [] Continue per plan of care [] Alter current plan (see comments above)  [x] Plan of care initiated [] Hold pending MD visit [] Discharge  Note: If patient does not return for scheduled/ recommended follow up visits, this note will serve as a discharge from care along with most recent update on progress. Reviewed insurance benefits for physical therapy in an outpatient hospital based setting with the patient, including deductible  and allowable visit number.  Pt was informed of possible out of pocket costs, as well as, informed of other service options for continuing supervised sessions without required skilled PT intervention such as the [facility] program.     Electronically signed by:   Mary Beth Martin, PT, DPT, Cert DN

## 2021-08-24 ENCOUNTER — HOSPITAL ENCOUNTER (OUTPATIENT)
Dept: PHYSICAL THERAPY | Age: 51
Setting detail: THERAPIES SERIES
Discharge: HOME | End: 2021-08-24
Payer: COMMERCIAL

## 2021-08-26 ENCOUNTER — HOSPITAL ENCOUNTER (OUTPATIENT)
Dept: PHYSICAL THERAPY | Age: 51
Setting detail: THERAPIES SERIES
Discharge: HOME | End: 2021-08-26
Payer: COMMERCIAL

## 2021-08-26 PROCEDURE — 97112 NEUROMUSCULAR REEDUCATION: CPT

## 2021-08-26 PROCEDURE — 97530 THERAPEUTIC ACTIVITIES: CPT

## 2021-08-26 PROCEDURE — 97110 THERAPEUTIC EXERCISES: CPT

## 2021-08-26 NOTE — FLOWSHEET NOTE
08/26/21   0                                                   Strength L R Comment   Quad 33.3 60.7  lbs with hand held dynamometer   Hamstring 55.0 61.0     Gastroc         Hip flexor         Hip ABD                                     RESTRICTIONS/PRECAUTIONS:     Exercises/Interventions:     Exercise/Equipment Resistance/Repetitions Other comments   Stretching     Hamstring [id number]    Hip Flexion     ITB     Grion     Quad     Inclined Calf     Towel Pull          SLR     Supine 3x10     Prone     Abduction 3x10    Adducton     SLR+     Clams     LAQ 3x10               Isometrics     Quad sets  With BFR 8/19   Hip Adduction     Hamstring                    Patellar Glides     Medial     Superior     Inferior          ROM     Sheet Pulls     Wall Slides     Edge of bed     Flexionator     Extensionator     Hang Weights     Ankle Pumps                              CKC     Calf raises           Step up and over     Lateral Step Downs     rockerboard  1x93rps mini squat hold          Mini squats 3x10 to chair with airex     CC TKE  4 plates [id number] with step up on L1         Lateral band walks     Side Planks     Half moon     Single leg flow          Biodex-balance     Single leg stance [id number] airex    Plyoback          Stool scoots     SB bridge     SB HS Curls     Planks          PRE     Extension  RANGE: 90/40   Flexion  RANGE: 0/90        Cable Column          Leg Press  RANGE: 70/10        Bike 5 min ROM     Treadmill                 Access Code: [email or web address]  URL: [email or web address]  Date: 08/10/2021  Prepared by: Mary Beth Martin    Exercises  Seated Table Hamstring Stretch - 1 x daily - 7 x weekly - 5 reps - 1 sets - 30 hold  Supine Active Straight Leg Raise - 1 x daily - 7 x weekly - 10 reps - 3 sets  Sidelying Hip Abduction - 1 x daily - 7 x weekly - 10 reps - 3 sets  Seated Long Arc Quad - 1 x daily - 7 x weekly - 3 sets - 10 reps  Single Leg Stance with Support - 1 x daily - 7 x weekly - 3 reps - 1 self care and ADLs  [] (08176) Gait Re-education- Provided training and instruction to the patient for proper LE, core and proximal hip recruitment and positioning and eccentric body weight control with ambulation re-education including up and down stairs     Home Exercise Program:    [x] (69542) Reviewed/Progressed HEP activities related to strengthening, flexibility, endurance, ROM of core, proximal hip and LE for functional self-care, mobility, lifting and ambulation/stair navigation   [] (38303)Reviewed/Progressed HEP activities related to improving balance, coordination, kinesthetic sense, posture, motor skill, proprioception of core, proximal hip and LE for self care, mobility, lifting, and ambulation/stair navigation      Manual Treatments:  PROM / STM / Oscillations-Mobs:  G-I, II, III, IV (PA's, Inf., Post.)  [] (42566) Provided manual therapy to mobilize LE, proximal hip and/or LS spine soft tissue/joints for the purpose of modulating pain, promoting relaxation,  increasing ROM, reducing/eliminating soft tissue swelling/inflammation/restriction, improving soft tissue extensibility and allowing for proper ROM for normal function with self care, mobility, lifting and ambulation. Modalities:      Charges:  Timed Code Treatment Minutes: 54   Total Treatment Minutes: 60     [] EVAL (LOW) 26692   [] EVAL (MOD) 57286   [] EVAL (HIGH) 81036   [] RE-EVAL   [x] BROOKS(03924) x  1   [] IONTO  [x] NMR (13239) x  2   [] VASO  [] Manual (05632) x      [] Other:  [x] TA x   1   [] Mech Traction (40396)  [] ES(attended) (04391)      [] ES (un) (23302):       GOALS:  Patient stated goal: use stairs normally, stop buckling from happening   [] Progressing: [] Met: [] Not Met: [] Adjusted    Therapist goals for Patient:   Short Term Goals: To be achieved in: 2 weeks  1. Independent in HEP and progression per patient tolerance, in order to prevent re-injury. [] Progressing: [] Met: [] Not Met: [] Adjusted   2.  Patient will have a decrease in pain to facilitate improvement in movement, function, and ADLs as indicated by Functional Deficits. [] Progressing: [] Met: [] Not Met: [] Adjusted    Long Term Goals: To be achieved in: 6-8 weeks  1. Disability index score of 12% or less for the [facility] to assist with reaching prior level of function. [] Progressing: [] Met: [] Not Met: [] Adjusted  2. Patient will demonstrate increased AROM to wfl painfree  to allow for proper joint functioning as indicated by patients Functional Deficits. [] Progressing: [] Met: [] Not Met: [] Adjusted  3. Patient will demonstrate an increase in Strength to within 10lbs of uninvolved side with handheld dynamometer to allow for proper functional mobility as indicated by patients Functional Deficits. [] Progressing: [] Met: [] Not Met: [] Adjusted  4. Patient will return to reciprocal gait on stairs without increased symptoms or restriction. [] Progressing: [] Met: [] Not Met: [] Adjusted    Overall Progression Towards Functional goals/ Treatment Progress Update:  [] Patient is progressing as expected towards functional goals listed. [] Progression is slowed due to complexities/Impairments listed. [] Progression has been slowed due to co-morbidities. [x] Plan just implemented, too soon to assess goals progression <30days   [] Goals require adjustment due to lack of progress  [] Patient is not progressing as expected and requires additional follow up with physician  [] Other    Prognosis for POC: [x] Good [] Fair  [] Poor      Patient requires continued skilled intervention: [x] Yes  [] No    Treatment/Activity Tolerance:  [x] Patient able to complete treatment  [] Patient limited by fatigue  [] Patient limited by pain     [] Patient limited by other medical complications  [] Other: pt able to progress bfr with SLR, notes it is challenging but is able to complete.  Performed TKE with step up to ensure she is engaging quad vs pushing off right foot to

## 2021-08-27 ENCOUNTER — TELEPHONE (OUTPATIENT)
Dept: BARIATRICS/WEIGHT MGMT | Age: 51
End: 2021-08-27

## 2021-08-27 ENCOUNTER — TELEMEDICINE (OUTPATIENT)
Dept: BARIATRICS/WEIGHT MGMT | Age: 51
End: 2021-08-27
Payer: COMMERCIAL

## 2021-08-27 DIAGNOSIS — E66.01 MORBID OBESITY WITH BMI OF 40.0-44.9, ADULT (HCC): Primary | ICD-10-CM

## 2021-08-27 DIAGNOSIS — Z71.3 DIETARY COUNSELING AND SURVEILLANCE: ICD-10-CM

## 2021-08-27 PROCEDURE — 99213 OFFICE O/P EST LOW 20 MIN: CPT | Performed by: FAMILY MEDICINE

## 2021-08-27 RX ORDER — NALTREXONE HYDROCHLORIDE AND BUPROPION HYDROCHLORIDE 8; 90 MG/1; MG/1
2 TABLET, EXTENDED RELEASE ORAL 2 TIMES DAILY
Qty: 120 TABLET | Refills: 0 | Status: SHIPPED | OUTPATIENT
Start: 2021-08-27 | End: 2021-10-01

## 2021-08-27 ASSESSMENT — ENCOUNTER SYMPTOMS
DIARRHEA: 0
EYE PAIN: 0
NAUSEA: 0
BLOOD IN STOOL: 0
PHOTOPHOBIA: 0
CHEST TIGHTNESS: 0
APNEA: 0
WHEEZING: 0
CHOKING: 0
COUGH: 0
CONSTIPATION: 0
VOMITING: 0
ABDOMINAL PAIN: 0
ABDOMINAL DISTENTION: 0
SHORTNESS OF BREATH: 0

## 2021-08-27 NOTE — PROGRESS NOTES
Calculation ([place]ett) 03/19/2021 462  ms Final    P Axis 03/19/2021 46  degrees Final    R Axis 03/19/2021 -28  degrees Final    T Axis 03/19/2021 11  degrees Final    Diagnosis 03/19/2021 Normal sinus rhythmLeftward axisOtherwise normal ECGNo previous ECGs availableConfirmed by CORRIE BENZ, Constantine Javier ([id number]) on 3/19/2021 11:20:09 AM   Final         Assessment and Plan:  1. Morbid obesity with BMI of 40.0-44.9, adult (HCC)  Stable. Reinforced low carb diet and exercise. Counseled on stimulus control. Track carbs. Continue Contrave. Increase physical activity as tolerated. F/u in 4 weeks. 2. Dietary counseling and surveillance  Low carb/vernell diet. Nutrition Plan: [] LCHF/Ketogenic   [x] Modified low-calorie diet (low carb/low-vernell)               [] Low-calorie diet    [] Maintenance       []Other        Exercise: [x] Cardio     [x] Resistance/strength training                       [x] ACSM recommendations (150 minutes/week in active weight loss)               Behavior: [x] Motivational interviewing performed    [] Referralfor counseling                         [x] Discussed strategies to overcome habits/challenges for focus         [] Stress management   [x] Stimulus control         [] Sleep hygiene      Reviewed:  [x] Nutrition and the importance of regular protein intake  [x] Hidden carbohydrate sources  [x] Alcohol use  [x] Tobacco use   [x] Drug use- Denies  [x] Importance of exercise and reducingsedentary time  [x] Treatment consent- Patient understands and agrees with the treatment plan   [x] Proper use of medication and side effects      Controlled Substance Monitoring:    No flowsheet data found.        Patient denies any history of cardiovascular disease (e.g., CAD, stroke, arrhythmias, CHF, uncontrolled HTN), seizure disorder, MAOI use within the last 2 weeks, hyperthyroidism, glaucoma, agitated states, history of drug abuse, pregnancy, nursing, known hypersensitivity to the prescribing meds, history of pancreatitis, or personal or family history of thyroid medullary cancer. Treatment start date: 8/1/21  12 weeks: 11/1/21  Starting weight: 282 pounds   Goal: At least 5-10% (14-28 pounds)  Total weight loss: 2 pounds       Total weight loss to date:     Key dietary points:    - Meats (preferably organic or grass fed) are great sources of protein and have no carbohydrates. - Recommend coconut, olive, avocado, or almond oils. - When buying dairy, choose regular or full fat options. - Choose vegetables that grow above ground as they are generally lower in carbohydrates and higher in fiber.  - Avoid starches such as bread, rice, potatoes, pasta and all sources of simple sugars (desserts, soda, breakfast cereals). - Choose beverages that are calorie and sugar free. Reminder regarding weight loss medications: You must be seen in office every 2-4 weeks to haveyour prescriptions refilled. If you are off of your medication for longer than 7 days, you will not be able to restart the medication for at least 6 months. Always call our office to report any side effects. Females, it is your responsibility to obtain negative pregnancy tests each month. No orders of the defined types were placed in this encounter. No follow-ups on file. Iva Jaramillo is a 46 y.o. female being evaluated by a Virtual Visit (video visit) encounter to address concerns as mentioned above. A caregiver was present when appropriate. Due to this being a TeleHealth encounter (During [facility]- public health emergency), evaluation of the following organ systems was limited: Vitals/Constitutional/EENT/Resp/CV/GI//MS/Neuro/Skin/Heme-Lymph-Imm.   Pursuant to the emergency declaration under the [address] authority and the [address] and [employer] Act, this Virtual Visit was conducted with patient's (and/or legal guardian's) consent, to reduce the patient's risk of exposure to COVID-19 and provide necessary medical care. The patient (and/or legal guardian) has also been advised to contact this office for worsening conditions or problems, and seek emergency medical treatment and/or call 911 if deemed necessary.

## 2021-08-31 ENCOUNTER — HOSPITAL ENCOUNTER (OUTPATIENT)
Dept: PHYSICAL THERAPY | Age: 51
Setting detail: THERAPIES SERIES
Discharge: HOME | End: 2021-08-31
Payer: COMMERCIAL

## 2021-08-31 PROCEDURE — 97530 THERAPEUTIC ACTIVITIES: CPT

## 2021-08-31 PROCEDURE — 97112 NEUROMUSCULAR REEDUCATION: CPT

## 2021-08-31 NOTE — FLOWSHEET NOTE
[address] [facility], [place]                                                         Physical Therapy Daily Treatment Note  Date:  2021    Patient Name:  Abhijeet Saravia    :  1970  MRN: [id number]    Medical/Treatment Diagnosis Information:  · Diagnosis: M25.562 (ICD-10-CM) - Left knee pain, unspecified chronicity  · Treatment Diagnosis: M25.562 (ICD-10-CM) - Left knee pain, unspecified chronicity  Insurance/Certification information:  PT Insurance Information: [employer] [place]  Physician Information:  Referring Practitioner: SOLOMON Chavarria  Has the plan of care been signed (Y/N):        []  Yes  [x]  No     Date of Patient follow up with Physician:       Is this a Progress Report:     []  Yes  [x]  No       Progress report will be due (10 Rx or 30 days whichever is less):       Recertification will be due (POC Duration  / 90 days whichever is less):          Visit # Insurance Allowable Auth Required   6 MN  []  Yes []  No        Functional Scale: WOMAC 24%     Date assessed:  8/10/21      Latex Allergy:  [x]NO      []YES  Preferred Language for Healthcare:   [x]English       []other:    Pain level:  0-2/10     SUBJECTIVE:  Pt notes she is doing alright, no pain in knee today.      OBJECTIVE: 8/10/21   Observation:    Test measurements:      Girth  RIGHT LEFT   10cm superior to patella 52 cm with quad set  47.5 cm w/ quad set   Midline patella       10cm inferior to patella               L R Comment   Hamstring         Gastroc         ITB         Quad                                   ROM PROM AROM Overpressure Comment     L R L R L R     Flexion     126 125         Extension     0 with quad set 21   0                                                   Strength L R Comment   Quad 33.3 60.7  lbs with hand held dynamometer   Hamstring 55.0 61.0     Gastroc         Hip flexor         Hip ABD                                   RESTRICTIONS/PRECAUTIONS:     Exercises/Interventions:     Exercise/Equipment Resistance/Repetitions Other comments   Stretching     Hamstring [id number]    Hip Flexion     ITB     Grion     Quad     Inclined Calf     Towel Pull          SLR     Supine 3x10     Prone     Abduction 3x10    Adducton     SLR+     Clams     LAQ 3x10               Isometrics     Quad sets  With BFR 8/19   Hip Adduction     Hamstring                    Patellar Glides     Medial     Superior     Inferior          ROM     Sheet Pulls     Wall Slides     Edge of bed     Flexionator     Extensionator     Hang Weights     Ankle Pumps                              CKC     Calf raises           Step up and over     Lateral Step Downs     rockerboard  [id number] mini squat hold          Mini squats 3x10 to chair with airex     CC TKE  4 plates 6W49 with step up on L1         Lateral band walks     Side Planks     Half moon     Single leg flow          Biodex-balance     Single leg stance [id number] airex    Plyoback          Stool scoots     SB bridge     SB HS Curls     Planks          PRE     Extension  RANGE: 90/40   Flexion  RANGE: 0/90        Cable Column          Leg Press  RANGE: 70/10        Bike 5 min ROM     Treadmill                 Access Code: [email or web address]  URL: [email or web address]  Date: 08/10/2021  Prepared by: Benjamin Schuler    Exercises  Seated Table Hamstring Stretch - 1 x daily - 7 x weekly - 5 reps - 1 sets - 30 hold  Supine Active Straight Leg Raise - 1 x daily - 7 x weekly - 10 reps - 3 sets  Sidelying Hip Abduction - 1 x daily - 7 x weekly - 10 reps - 3 sets  Seated Long Arc Quad - 1 x daily - 7 x weekly - 3 sets - 10 reps  Single Leg Stance with Support - 1 x daily - 7 x weekly - 3 reps - 1 sets - 30 hold  Standing Terminal Knee Extension with Resistance - 1 x daily - 7 x weekly - 10 reps - 3 sets          Bloodflow restriction was utilized throughout exercise session with use of Janene Unit for a period of 8 minutes. The Cuff was deflated every 10 minutes for reperfusion during treatment. The pateint was monitored for parathesia as well as poor tolerance throughout the treatment session. BFR Smart Phase Protocol                    BFR Session 08/31/21                          1 min rest period between each set of repetitions. 2 Min rest/reperfusion between    each exercises protocol perfromed                      BFR Locations L LE  BFR set at: 144 mmHg (80% )                   Protocol: 30/15/15/15           Exercises:   Reps 30s Reps 30s Reps 30s Reps Notes                      SLR  reps required  30  15  15  15     completed  30  15  15  15    LAQ reps required  30  15  15  15     completed             reps required  30  15  15  15     completed                    Therapeutic Exercise and NMR EXR  [] (62314) Provided verbal/tactile cueing for activities related to strengthening, flexibility, endurance, ROM for improvements in LE, proximal hip, and core control with self care, mobility, lifting, ambulation.  [] (97634) Provided verbal/tactile cueing for activities related to improving balance, coordination, kinesthetic sense, posture, motor skill, proprioception  to assist with LE, proximal hip, and core control in self care, mobility, lifting, ambulation and eccentric single leg control.      NMR and Therapeutic Activities:    [] (14140 or 88541) Provided verbal/tactile cueing for activities related to improving balance, coordination, kinesthetic sense, posture, motor skill, proprioception and motor activation to allow for proper function of core, proximal hip and LE with self care and ADLs  [] (39470) Gait Re-education- Provided training and instruction to the patient for proper LE, core and proximal hip recruitment and positioning and eccentric body weight control with ambulation re-education including up and down stairs     Home Exercise Program:    [x] (30421) Reviewed/Progressed HEP activities related to strengthening, flexibility, endurance, ROM of core, proximal hip and LE for functional self-care, mobility, lifting and ambulation/stair navigation   [] (15000)Reviewed/Progressed HEP activities related to improving balance, coordination, kinesthetic sense, posture, motor skill, proprioception of core, proximal hip and LE for self care, mobility, lifting, and ambulation/stair navigation      Manual Treatments:  PROM / STM / Oscillations-Mobs:  G-I, II, III, IV (PA's, Inf., Post.)  [] (09931) Provided manual therapy to mobilize LE, proximal hip and/or LS spine soft tissue/joints for the purpose of modulating pain, promoting relaxation,  increasing ROM, reducing/eliminating soft tissue swelling/inflammation/restriction, improving soft tissue extensibility and allowing for proper ROM for normal function with self care, mobility, lifting and ambulation. Modalities:      Charges:  Timed Code Treatment Minutes: 48   Total Treatment Minutes: 48     [] EVAL (LOW) 04685   [] EVAL (MOD) 05628   [] EVAL (HIGH) 74862   [] RE-EVAL   [] JV(56914) x     [] IONTO  [x] NMR (94097) x  2   [] VASO  [] Manual (76249) x      [] Other:  [x] TA x   1   [] Mech Traction (64851)  [] ES(attended) (30032)      [] ES (un) (30295):       GOALS:  Patient stated goal: use stairs normally, stop buckling from happening   [] Progressing: [] Met: [] Not Met: [] Adjusted    Therapist goals for Patient:   Short Term Goals: To be achieved in: 2 weeks  1. Independent in HEP and progression per patient tolerance, in order to prevent re-injury. [] Progressing: [] Met: [] Not Met: [] Adjusted   2. Patient will have a decrease in pain to facilitate improvement in movement, function, and ADLs as indicated by Functional Deficits. [] Progressing: [] Met: [] Not Met: [] Adjusted    Long Term Goals: To be achieved in: 6-8 weeks  1. Disability index score of 12% or less for the [facility] to assist with reaching prior level of function.    [] Progressing: [] Met: [] Not Met: [] Adjusted  2. Patient will demonstrate increased AROM to wfl painfree  to allow for proper joint functioning as indicated by patients Functional Deficits. [] Progressing: [] Met: [] Not Met: [] Adjusted  3. Patient will demonstrate an increase in Strength to within 10lbs of uninvolved side with handheld dynamometer to allow for proper functional mobility as indicated by patients Functional Deficits. [] Progressing: [] Met: [] Not Met: [] Adjusted  4. Patient will return to reciprocal gait on stairs without increased symptoms or restriction. [] Progressing: [] Met: [] Not Met: [] Adjusted    Overall Progression Towards Functional goals/ Treatment Progress Update:  [] Patient is progressing as expected towards functional goals listed. [] Progression is slowed due to complexities/Impairments listed. [] Progression has been slowed due to co-morbidities. [x] Plan just implemented, too soon to assess goals progression <30days   [] Goals require adjustment due to lack of progress  [] Patient is not progressing as expected and requires additional follow up with physician  [] Other    Prognosis for POC: [x] Good [] Fair  [] Poor      Patient requires continued skilled intervention: [x] Yes  [] No    Treatment/Activity Tolerance:  [x] Patient able to complete treatment  [] Patient limited by fatigue  [] Patient limited by pain     [] Patient limited by other medical complications  [] Other: pt able to progress bfr today and is challenged. Continue to progress as tolerated with quad strengthening.          Return to Play: (if applicable)   []  Stage 1: Intro to Strength   []  Stage 2: Return to Run and Strength   []  Stage 3: Return to Jump and Strength   []  Stage 4: Dynamic Strength and Agility   []  Stage 5: Sport Specific Training     []  Ready to Return to Play, Meets All Above Stages   []  Not Ready for Return to Sports   Comments:                               PLAN: See eval; consider BFR training when pt has improved quad contraction  [] Continue per plan of care [] Alter current plan (see comments above)  [x] Plan of care initiated [] Hold pending MD visit [] Discharge  Note: If patient does not return for scheduled/ recommended follow up visits, this note will serve as a discharge from care along with most recent update on progress. Reviewed insurance benefits for physical therapy in an outpatient hospital based setting with the patient, including deductible  and allowable visit number.  Pt was informed of possible out of pocket costs, as well as, informed of other service options for continuing supervised sessions without required skilled PT intervention such as the cash based [employer] program.     Electronically signed by:   Debra Kaur, PT, DPT, Cert DN

## 2021-09-02 ENCOUNTER — HOSPITAL ENCOUNTER (OUTPATIENT)
Dept: PHYSICAL THERAPY | Age: 51
Setting detail: THERAPIES SERIES
Discharge: HOME | End: 2021-09-02
Payer: COMMERCIAL

## 2021-09-02 ENCOUNTER — APPOINTMENT (OUTPATIENT)
Dept: PHYSICAL THERAPY | Age: 51
End: 2021-09-02
Payer: COMMERCIAL

## 2021-09-02 PROCEDURE — 97530 THERAPEUTIC ACTIVITIES: CPT

## 2021-09-02 PROCEDURE — 97112 NEUROMUSCULAR REEDUCATION: CPT

## 2021-09-02 NOTE — PLAN OF CARE
[address] [facility] [address], [place]                                                     Physical Therapy Re-Certification Plan of Care/MD UPDATE      Dear   Referring Practitioner: SOLOMON Ohara    We had the pleasure of treating the following patient for physical therapy services at [address] [place] [address]. A summary of our findings can be found in the updated assessment below. This includes our plan of care. If you have any questions or concerns regarding these findings, please do not hesitate to contact me at the office phone number checked above. Thank you for the referral.     Physician Signature:________________________________Date:__________________  By signing above (or electronic signature), therapists plan is approved by physician    Date Range Of Visits: 8/10/21-21  Total Visits to Date: 7  Overall Response to Treatment:   []Patient is responding well to treatment and improvement is noted with regards  to goals   []Patient should continue to improve in reasonable time if they continue HEP   []Patient has plateaued and is no longer responding to skilled PT intervention    []Patient is getting worse and would benefit from return to referring MD   []Patient unable to adhere to initial POC   []Other:  Pt shows improve quad strength and improved quad tone. She notes she is feeling stronger with activities but does still feel buckling intermittently on stairs. She has been progressing with BFR nicely without issue. Pt will continue to require skilled intervention 1-2x per week for 4-6 weeks to continue to progress quad strength to improve functional mobility/strength.         Physical Therapy Daily Treatment Note  Date:  2021    Patient Name:  Milagros Cobb    :  1970  MRN: [id number]    Medical/Treatment Diagnosis Information:  · Diagnosis: M25.562 (ICD-10-CM) - Left knee pain, unspecified chronicity  · Treatment Diagnosis: M25.562 (ICD-10-CM) - Left knee pain, unspecified chronicity  Insurance/Certification information:  PT Insurance Information: Med [place]  Physician Information:  Referring Practitioner: SOLOMON Yao  Has the plan of care been signed (Y/N):        []  Yes  [x]  No     Date of Patient follow up with Physician:       Is this a Progress Report:     []  Yes  [x]  No       Progress report will be due (10 Rx or 30 days whichever is less): 5/26/70      Recertification will be due (POC Duration  / 90 days whichever is less):          Visit # Insurance Allowable Auth Required   7 MN  []  Yes []  No        Functional Scale: WOMAC 24%     Date assessed:  8/10/21      Latex Allergy:  [x]NO      []YES  Preferred Language for Healthcare:   [x]English       []other:    Pain level:  0-2/10     SUBJECTIVE: pt reports she is doing well, she feels tighter this morning though. She does feel like she is getting stronger. She has been using her estim unit daily still as well.      OBJECTIVE: 9/2/21   Observation:    Test measurements:      Girth  RIGHT LEFT   10cm superior to patella 52 cm with quad set  49 cm w/ quad set   Midline patella       10cm inferior to patella               L R Comment   Hamstring         Gastroc         ITB         Quad                                   ROM PROM AROM Overpressure Comment     L R L R L R     Flexion     126 125         Extension     0 with quad set  0                                                   Strength L R Comment   Quad 49.0 61.3  lbs with hand held dynamometer   Hamstring  54.3  54     Gastroc         Hip flexor         Hip ABD                                     RESTRICTIONS/PRECAUTIONS:     Exercises/Interventions:     Exercise/Equipment Resistance/Repetitions Other comments   Stretching     Hamstring [id number]    Hip Flexion     ITB     Grion     Quad     Inclined Calf     Towel Pull          SLR     Supine 3x10     Prone     Abduction 3x10 Adducton     SLR+     Clams     LAQ 3x10               Isometrics     Quad sets  With BFR 8/19   Hip Adduction     Hamstring                    Patellar Glides     Medial     Superior     Inferior          ROM     Sheet Pulls     Wall Slides     Edge of bed     Flexionator     Extensionator     Hang Weights     Ankle Pumps                              CKC     Calf raises           Step up and over     Lateral Step Downs     rockerboard  [id number] mini squat hold          Mini squats 3x10 to chair with airex     CC TKE           Lateral band walks     Side Planks     Half moon     Single leg flow          Biodex-balance     Single leg stance [id number] airex    Plyoback          Stool scoots     SB bridge     SB HS Curls     Planks          PRE     Extension  RANGE: 90/40   Flexion  RANGE: 0/90        Cable Column          Leg Press  RANGE: 70/10        Bike 5 min ROM     Treadmill                 Access Code: [email or web address]  URL: [email or web address]  Date: 08/10/2021  Prepared by: Jacki Ortiz    Exercises  Seated Table Hamstring Stretch - 1 x daily - 7 x weekly - 5 reps - 1 sets - 30 hold  Supine Active Straight Leg Raise - 1 x daily - 7 x weekly - 10 reps - 3 sets  Sidelying Hip Abduction - 1 x daily - 7 x weekly - 10 reps - 3 sets  Seated Long Arc Quad - 1 x daily - 7 x weekly - 3 sets - 10 reps  Single Leg Stance with Support - 1 x daily - 7 x weekly - 3 reps - 1 sets - 30 hold  Standing Terminal Knee Extension with Resistance - 1 x daily - 7 x weekly - 10 reps - 3 sets          Bloodflow restriction was utilized throughout exercise session with use of Janene Unit for a period of 8 minutes. The Cuff was deflated every 10 minutes for reperfusion during treatment. The pateint was monitored for parathesia as well as poor tolerance throughout the treatment session. BFR Smart Phase Protocol                    BFR Session 09/02/21                          1 min rest period between each set of repetitions. 2 Min rest/reperfusion between    each exercises protocol perfromed                      BFR Locations L LE  BFR set at: 162 mmHg (80% )                   Protocol: 30/15/15/15           Exercises:   Reps 30s Reps 30s Reps 30s Reps Notes                      SLR  reps required  30  15  15  15     completed  30  15  15  15    LAQ reps required  30  15  15  15     completed            CK TKE 4 plates  reps required  30  15  15  15     completed  30  15  15  15            Therapeutic Exercise and NMR EXR  [] (25185) Provided verbal/tactile cueing for activities related to strengthening, flexibility, endurance, ROM for improvements in LE, proximal hip, and core control with self care, mobility, lifting, ambulation.  [] (32472) Provided verbal/tactile cueing for activities related to improving balance, coordination, kinesthetic sense, posture, motor skill, proprioception  to assist with LE, proximal hip, and core control in self care, mobility, lifting, ambulation and eccentric single leg control.      NMR and Therapeutic Activities:    [] (83902 or 36480) Provided verbal/tactile cueing for activities related to improving balance, coordination, kinesthetic sense, posture, motor skill, proprioception and motor activation to allow for proper function of core, proximal hip and LE with self care and ADLs  [] (52093) Gait Re-education- Provided training and instruction to the patient for proper LE, core and proximal hip recruitment and positioning and eccentric body weight control with ambulation re-education including up and down stairs     Home Exercise Program:    [x] (61213) Reviewed/Progressed HEP activities related to strengthening, flexibility, endurance, ROM of core, proximal hip and LE for functional self-care, mobility, lifting and ambulation/stair navigation   [] (34800)Reviewed/Progressed HEP activities related to improving balance, coordination, kinesthetic sense, posture, motor skill, proprioception of core, proximal hip and LE for self care, mobility, lifting, and ambulation/stair navigation      Manual Treatments:  PROM / STM / Oscillations-Mobs:  G-I, II, III, IV (PA's, Inf., Post.)  [] (72955) Provided manual therapy to mobilize LE, proximal hip and/or LS spine soft tissue/joints for the purpose of modulating pain, promoting relaxation,  increasing ROM, reducing/eliminating soft tissue swelling/inflammation/restriction, improving soft tissue extensibility and allowing for proper ROM for normal function with self care, mobility, lifting and ambulation. Modalities:      Charges:  Timed Code Treatment Minutes: 48   Total Treatment Minutes: 48     [] EVAL (LOW) 28935   [] EVAL (MOD) 64955   [] EVAL (HIGH) 84043   [] RE-EVAL   [] EY(80690) x     [] IONTO  [x] NMR (94817) x  2   [] VASO  [] Manual (94568) x      [] Other:  [x] TA x   1   [] Mech Traction (12741)  [] ES(attended) (04239)      [] ES (un) (56690):       GOALS:  Patient stated goal: use stairs normally, stop buckling from happening   [x] Progressing: [] Met: [] Not Met: [] Adjusted    Therapist goals for Patient:   Short Term Goals: To be achieved in: 2 weeks  1. Independent in HEP and progression per patient tolerance, in order to prevent re-injury. [x] Progressing: [] Met: [] Not Met: [] Adjusted   2. Patient will have a decrease in pain to facilitate improvement in movement, function, and ADLs as indicated by Functional Deficits. [] Progressing: [x] Met: [] Not Met: [] Adjusted    Long Term Goals: To be achieved in: 6-8 weeks  1. Disability index score of 12% or less for the [facility] to assist with reaching prior level of function. [] Progressing: [] Met: [] Not Met: [] Adjusted  2. Patient will demonstrate increased AROM to wfl painfree  to allow for proper joint functioning as indicated by patients Functional Deficits. [] Progressing: [x] Met: [] Not Met: [] Adjusted  3.  Patient will demonstrate an increase in Strength to within 10lbs of uninvolved side with handheld dynamometer to allow for proper functional mobility as indicated by patients Functional Deficits. [x] Progressing: [] Met: [] Not Met: [] Adjusted  4. Patient will return to reciprocal gait on stairs without increased symptoms or restriction. [x] Progressing: [] Met: [] Not Met: [] Adjusted    Overall Progression Towards Functional goals/ Treatment Progress Update:  [x] Patient is progressing as expected towards functional goals listed. [] Progression is slowed due to complexities/Impairments listed. [] Progression has been slowed due to co-morbidities. [] Plan just implemented, too soon to assess goals progression <30days   [] Goals require adjustment due to lack of progress  [] Patient is not progressing as expected and requires additional follow up with physician  [] Other    Prognosis for POC: [x] Good [] Fair  [] Poor      Patient requires continued skilled intervention: [x] Yes  [] No    Treatment/Activity Tolerance:  [x] Patient able to complete treatment  [] Patient limited by fatigue  [] Patient limited by pain     [] Patient limited by other medical complications  [] Other: pt able to progress BFR today with ck tke. She notes fatigue at end of session but no pain. Continue to progress as tolerated.          Return to Play: (if applicable)   []  Stage 1: Intro to Strength   []  Stage 2: Return to Run and Strength   []  Stage 3: Return to Jump and Strength   []  Stage 4: Dynamic Strength and Agility   []  Stage 5: Sport Specific Training     []  Ready to Return to Play, Meets All Above Stages   []  Not Ready for Return to Sports   Comments:                               PLAN: Continue to progress BFR training and functional quad strengthening   [] Continue per plan of care [] Alter current plan (see comments above)  [x] Plan of care initiated [] Hold pending MD visit [] Discharge  Note: If patient does not return for scheduled/ recommended follow up visits, this note

## 2021-09-07 ENCOUNTER — HOSPITAL ENCOUNTER (OUTPATIENT)
Dept: PHYSICAL THERAPY | Age: 51
Setting detail: THERAPIES SERIES
Discharge: HOME | End: 2021-09-07
Payer: COMMERCIAL

## 2021-09-07 PROCEDURE — 97112 NEUROMUSCULAR REEDUCATION: CPT

## 2021-09-07 PROCEDURE — 97530 THERAPEUTIC ACTIVITIES: CPT

## 2021-09-07 NOTE — FLOWSHEET NOTE
[address] [facility]  [facility], [facility]                     Physical Therapy Daily Treatment Note  Date:  2021    Patient Name:  Eusebio Panchal    :  1970  MRN: [id number]    Medical/Treatment Diagnosis Information:  · Diagnosis: M25.562 (ICD-10-CM) - Left knee pain, unspecified chronicity  · Treatment Diagnosis: M25.562 (ICD-10-CM) - Left knee pain, unspecified chronicity  Insurance/Certification information:  PT Insurance Information: Med [place]  Physician Information:  Referring Practitioner: SOLOMON Clay  Has the plan of care been signed (Y/N):        []  Yes  [x]  No     Date of Patient follow up with Physician:       Is this a Progress Report:     []  Yes  [x]  No       Progress report will be due (10 Rx or 30 days whichever is less): 3/19/78      Recertification will be due (POC Duration  / 90 days whichever is less):          Visit # Insurance Allowable Auth Required   8 MN  []  Yes []  No        Functional Scale: WOMAC 24%     Date assessed:  8/10/21      Latex Allergy:  [x]NO      []YES  Preferred Language for Healthcare:   [x]English       []other:    Pain level:  0-2/10     SUBJECTIVE: pt notes she is doing well, no increased pain in the knee but she is fatigued from doing a lot during the day already today.      OBJECTIVE: 21   Observation:    Test measurements:      Girth  RIGHT LEFT   10cm superior to patella 52 cm with quad set  49 cm w/ quad set   Midline patella       10cm inferior to patella               L R Comment   Hamstring         Gastroc         ITB         Quad                                   ROM PROM AROM Overpressure Comment     L R L R L R     Flexion     126 125         Extension     0 with quad set  0                                                   Strength L R Comment   Quad 49.0 61.3  lbs with hand held dynamometer   Hamstring  54.3  54     Gastroc         Hip flexor         Hip ABD                                     RESTRICTIONS/PRECAUTIONS:     Exercises/Interventions:     Exercise/Equipment Resistance/Repetitions Other comments   Stretching     Hamstring [id number]    Hip Flexion     ITB     Grion     Quad     Inclined Calf     Towel Pull          SLR    Supine    Prone    Abduction    Adducton    SLR+    Clams    LAQ              Isometrics     Quad sets  With BFR 8/19   Hip Adduction     Hamstring                    Patellar Glides     Medial     Superior     Inferior          ROM     Sheet Pulls     Wall Slides     Edge of bed     Flexionator     Extensionator     Hang Weights     Ankle Pumps                              CKC     Calf raises           Step up and over     Lateral Step Downs     rockerboard           Mini squats     CC TKE           Lateral band walks     Side Planks     Half moon     Single leg flow          Biodex-balance LS x3 level 7    Single leg stance [id number] airex    Plyoback          Stool scoots     SB bridge     SB HS Curls     Planks          PRE     Extension  RANGE: 90/40   Flexion  RANGE: 0/90        Cable Column          Leg Press  RANGE: 70/10        Bike 5 min ROM     Treadmill                 Access Code: [email or web address]  URL: [email or web address]  Date: 08/10/2021  Prepared by: Dulce Velez    Exercises  Seated Table Hamstring Stretch - 1 x daily - 7 x weekly - 5 reps - 1 sets - 30 hold  Supine Active Straight Leg Raise - 1 x daily - 7 x weekly - 10 reps - 3 sets  Sidelying Hip Abduction - 1 x daily - 7 x weekly - 10 reps - 3 sets  Seated Long Arc Quad - 1 x daily - 7 x weekly - 3 sets - 10 reps  Single Leg Stance with Support - 1 x daily - 7 x weekly - 3 reps - 1 sets - 30 hold  Standing Terminal Knee Extension with Resistance - 1 x daily - 7 x weekly - 10 reps - 3 sets          Bloodflow restriction was utilized throughout exercise session with use of Janene Unit for a period of 8 minutes.   The Cuff was deflated every 10 minutes for reperfusion during treatment. The pateint was monitored for parathesia as well as poor tolerance throughout the treatment session. BFR Smart Phase Protocol                    BFR Session 09/07/21                          1 min rest period between each set of repetitions. 2 Min rest/reperfusion between    each exercises protocol perfromed                      BFR Locations L LE  BFR set at: 155 mmHg (70% ) 179 mmhg (80%)                  Protocol: 30/15/15/15           Exercises:   Reps 30s Reps 30s Reps 30s Reps Notes                      SLR  reps required  30  15  15  15     completed  30  15  15  15    LAQ reps required  30  15  15  15     completed            Squats to chair  reps required  30  15  15  15     completed  30  15  15  15            Therapeutic Exercise and NMR EXR  [] (46343) Provided verbal/tactile cueing for activities related to strengthening, flexibility, endurance, ROM for improvements in LE, proximal hip, and core control with self care, mobility, lifting, ambulation.  [] (82042) Provided verbal/tactile cueing for activities related to improving balance, coordination, kinesthetic sense, posture, motor skill, proprioception  to assist with LE, proximal hip, and core control in self care, mobility, lifting, ambulation and eccentric single leg control.      NMR and Therapeutic Activities:    [] (89800 or 32454) Provided verbal/tactile cueing for activities related to improving balance, coordination, kinesthetic sense, posture, motor skill, proprioception and motor activation to allow for proper function of core, proximal hip and LE with self care and ADLs  [] (49393) Gait Re-education- Provided training and instruction to the patient for proper LE, core and proximal hip recruitment and positioning and eccentric body weight control with ambulation re-education including up and down stairs     Home Exercise Program:    [x] (81205) Reviewed/Progressed HEP activities related to strengthening, flexibility, endurance, ROM of core, proximal hip and LE for functional self-care, mobility, lifting and ambulation/stair navigation   [] (37947)Reviewed/Progressed HEP activities related to improving balance, coordination, kinesthetic sense, posture, motor skill, proprioception of core, proximal hip and LE for self care, mobility, lifting, and ambulation/stair navigation      Manual Treatments:  PROM / STM / Oscillations-Mobs:  G-I, II, III, IV (PA's, Inf., Post.)  [] (39893) Provided manual therapy to mobilize LE, proximal hip and/or LS spine soft tissue/joints for the purpose of modulating pain, promoting relaxation,  increasing ROM, reducing/eliminating soft tissue swelling/inflammation/restriction, improving soft tissue extensibility and allowing for proper ROM for normal function with self care, mobility, lifting and ambulation. Modalities:      Charges:  Timed Code Treatment Minutes: 46   Total Treatment Minutes: 48     [] EVAL (LOW) 84979   [] EVAL (MOD) 99035   [] EVAL (HIGH) 91434   [] RE-EVAL   [] KH(70956) x     [] IONTO  [x] NMR (87420) x  2   [] VASO  [] Manual (52044) x      [] Other:  [x] TA x   1   [] Mech Traction (85462)  [] ES(attended) (21650)      [] ES (un) (30227):       GOALS:  Patient stated goal: use stairs normally, stop buckling from happening   [x] Progressing: [] Met: [] Not Met: [] Adjusted    Therapist goals for Patient:   Short Term Goals: To be achieved in: 2 weeks  1. Independent in HEP and progression per patient tolerance, in order to prevent re-injury. [x] Progressing: [] Met: [] Not Met: [] Adjusted   2. Patient will have a decrease in pain to facilitate improvement in movement, function, and ADLs as indicated by Functional Deficits. [] Progressing: [x] Met: [] Not Met: [] Adjusted    Long Term Goals: To be achieved in: 6-8 weeks  1. Disability index score of 12% or less for the [facility] to assist with reaching prior level of function.    [] Progressing: [] Met: [] Not Met: [] Adjusted  2. Patient will demonstrate increased AROM to wfl painfree  to allow for proper joint functioning as indicated by patients Functional Deficits. [] Progressing: [x] Met: [] Not Met: [] Adjusted  3. Patient will demonstrate an increase in Strength to within 10lbs of uninvolved side with handheld dynamometer to allow for proper functional mobility as indicated by patients Functional Deficits. [x] Progressing: [] Met: [] Not Met: [] Adjusted  4. Patient will return to reciprocal gait on stairs without increased symptoms or restriction. [x] Progressing: [] Met: [] Not Met: [] Adjusted    Overall Progression Towards Functional goals/ Treatment Progress Update:  [x] Patient is progressing as expected towards functional goals listed. [] Progression is slowed due to complexities/Impairments listed. [] Progression has been slowed due to co-morbidities. [] Plan just implemented, too soon to assess goals progression <30days   [] Goals require adjustment due to lack of progress  [] Patient is not progressing as expected and requires additional follow up with physician  [] Other    Prognosis for POC: [x] Good [] Fair  [] Poor      Patient requires continued skilled intervention: [x] Yes  [] No    Treatment/Activity Tolerance:  [x] Patient able to complete treatment  [] Patient limited by fatigue  [] Patient limited by pain     [] Patient limited by other medical complications  [] Other: pt able to progress BFR today with squats today. She notes fatigue at end of session but no pain. Continue to progress as tolerated for increased functional strength.          Return to Play: (if applicable)   []  Stage 1: Intro to Strength   []  Stage 2: Return to Run and Strength   []  Stage 3: Return to Jump and Strength   []  Stage 4: Dynamic Strength and Agility   []  Stage 5: Sport Specific Training     []  Ready to Return to Play, Meets All Above Stages   []  Not Ready for Return to Sports   Comments: PLAN: Continue to progress BFR training and functional quad strengthening   [] Continue per plan of care [] Alter current plan (see comments above)  [x] Plan of care initiated [] Hold pending MD visit [] Discharge  Note: If patient does not return for scheduled/ recommended follow up visits, this note will serve as a discharge from care along with most recent update on progress. Reviewed insurance benefits for physical therapy in an outpatient hospital based setting with the patient, including deductible  and allowable visit number.  Pt was informed of possible out of pocket costs, as well as, informed of other service options for continuing supervised sessions without required skilled PT intervention such as the cash based [employer] program.     Electronically signed by:   Isabel Giordano, PT, DPT, Cert DN

## 2021-09-09 ENCOUNTER — HOSPITAL ENCOUNTER (OUTPATIENT)
Dept: PHYSICAL THERAPY | Age: 51
Setting detail: THERAPIES SERIES
Discharge: HOME | End: 2021-09-09
Payer: COMMERCIAL

## 2021-09-09 PROCEDURE — 97112 NEUROMUSCULAR REEDUCATION: CPT

## 2021-09-09 PROCEDURE — 97530 THERAPEUTIC ACTIVITIES: CPT

## 2021-09-09 NOTE — FLOWSHEET NOTE
[address] [place] [address] [facility], [place]                     Physical Therapy Daily Treatment Note  Date:  2021    Patient Name:  Dereck Fisher    :  1970  MRN: [id number]    Medical/Treatment Diagnosis Information:  · Diagnosis: M25.562 (ICD-10-CM) - Left knee pain, unspecified chronicity  · Treatment Diagnosis: M25.562 (ICD-10-CM) - Left knee pain, unspecified chronicity  Insurance/Certification information:  PT Insurance Information: [employer] [place]  Physician Information:  Referring Practitioner: SOLOMON Cruz  Has the plan of care been signed (Y/N):        []  Yes  [x]  No     Date of Patient follow up with Physician:       Is this a Progress Report:     []  Yes  [x]  No       Progress report will be due (10 Rx or 30 days whichever is less):       Recertification will be due (POC Duration  / 90 days whichever is less):          Visit # Insurance Allowable Auth Required   9 MN  []  Yes []  No        Functional Scale: WOMAC 24%     Date assessed:  8/10/21      Latex Allergy:  [x]NO      []YES  Preferred Language for Healthcare:   [x]English       []other:    Pain level:  0-2/10     SUBJECTIVE:  Pt notes that she is doing well, she notes she is still slightly unsteady on stairs. She feels more uncomfortable going up reciprocally if she is carrying laundry basket. She feels okay going down stairs reciprocally.     OBJECTIVE: 21   Observation:    Test measurements:      Girth  RIGHT LEFT   10cm superior to patella 52 cm with quad set  49 cm w/ quad set   Midline patella       10cm inferior to patella               L R Comment   Hamstring         Gastroc         ITB         Quad                                   ROM PROM AROM Overpressure Comment     L R L R L R     Flexion     126 125         Extension     0 with quad set  0                                                   Strength L R Comment   Quad 49.0 61.3  lbs with hand held dynamometer   Hamstring  54.3  54     Gastroc         Hip flexor         Hip ABD                                     RESTRICTIONS/PRECAUTIONS:     Exercises/Interventions:     Exercise/Equipment Resistance/Repetitions Other comments   Stretching     Hamstring [id number]    Hip Flexion     ITB     Grion     Quad     Inclined Calf     Towel Pull          SLR    Supine    Prone    Abduction    Adducton    SLR+    Clams    LAQ              Isometrics     Quad sets  With BFR 8/19   Hip Adduction     Hamstring                    Patellar Glides     Medial     Superior     Inferior          ROM     Sheet Pulls     Wall Slides     Edge of bed     Flexionator     Extensionator     Hang Weights     Ankle Pumps                              CKC     Calf raises           Step up and over     Lateral Step Downs 3x10 L1    rockerboard           Mini squats     CC TKE  4 plates [id number] with step up on L1         Lateral band walks     Side Planks     Half moon     Single leg flow          Biodex-balance LS x3 level 7    Single leg stance [id number] airex    Plyoback          Stool scoots     SB bridge     SB HS Curls     Planks          PRE     Extension  RANGE: 90/40   Flexion  RANGE: 0/90        Cable Column          Leg Press  RANGE: 70/10        Bike 5 min ROM     Treadmill                 Access Code: [email or web address]  URL: [email or web address]  Date: 08/10/2021  Prepared by: Caleb Beach    Exercises  Seated Table Hamstring Stretch - 1 x daily - 7 x weekly - 5 reps - 1 sets - 30 hold  Supine Active Straight Leg Raise - 1 x daily - 7 x weekly - 10 reps - 3 sets  Sidelying Hip Abduction - 1 x daily - 7 x weekly - 10 reps - 3 sets  Seated Long Arc Quad - 1 x daily - 7 x weekly - 3 sets - 10 reps  Single Leg Stance with Support - 1 x daily - 7 x weekly - 3 reps - 1 sets - 30 hold  Standing Terminal Knee Extension with Resistance - 1 x daily - 7 x weekly - 10 reps - 3 sets          Bloodflow restriction was utilized Reviewed/Progressed HEP activities related to strengthening, flexibility, endurance, ROM of core, proximal hip and LE for functional self-care, mobility, lifting and ambulation/stair navigation   [] (76598)Reviewed/Progressed HEP activities related to improving balance, coordination, kinesthetic sense, posture, motor skill, proprioception of core, proximal hip and LE for self care, mobility, lifting, and ambulation/stair navigation      Manual Treatments:  PROM / STM / Oscillations-Mobs:  G-I, II, III, IV (PA's, Inf., Post.)  [] (58325) Provided manual therapy to mobilize LE, proximal hip and/or LS spine soft tissue/joints for the purpose of modulating pain, promoting relaxation,  increasing ROM, reducing/eliminating soft tissue swelling/inflammation/restriction, improving soft tissue extensibility and allowing for proper ROM for normal function with self care, mobility, lifting and ambulation. Modalities:      Charges:  Timed Code Treatment Minutes: 46   Total Treatment Minutes: 48     [] EVAL (LOW) 80546   [] EVAL (MOD) 39141   [] EVAL (HIGH) 16151   [] RE-EVAL   [] BROOKS(76177) x     [] IONTO  [x] NMR (99814) x 1   [] VASO  [] Manual (66818) x      [] Other:  [x] TA x   2   [] Mech Traction (68244)  [] ES(attended) (45233)      [] ES (un) (80142):       GOALS:  Patient stated goal: use stairs normally, stop buckling from happening   [x] Progressing: [] Met: [] Not Met: [] Adjusted    Therapist goals for Patient:   Short Term Goals: To be achieved in: 2 weeks  1. Independent in HEP and progression per patient tolerance, in order to prevent re-injury. [x] Progressing: [] Met: [] Not Met: [] Adjusted   2. Patient will have a decrease in pain to facilitate improvement in movement, function, and ADLs as indicated by Functional Deficits. [] Progressing: [x] Met: [] Not Met: [] Adjusted    Long Term Goals: To be achieved in: 6-8 weeks  1.  Disability index score of 12% or less for the [facility] to assist with reaching prior level of function. [] Progressing: [] Met: [] Not Met: [] Adjusted  2. Patient will demonstrate increased AROM to wfl painfree  to allow for proper joint functioning as indicated by patients Functional Deficits. [] Progressing: [x] Met: [] Not Met: [] Adjusted  3. Patient will demonstrate an increase in Strength to within 10lbs of uninvolved side with handheld dynamometer to allow for proper functional mobility as indicated by patients Functional Deficits. [x] Progressing: [] Met: [] Not Met: [] Adjusted  4. Patient will return to reciprocal gait on stairs without increased symptoms or restriction. [x] Progressing: [] Met: [] Not Met: [] Adjusted    Overall Progression Towards Functional goals/ Treatment Progress Update:  [x] Patient is progressing as expected towards functional goals listed. [] Progression is slowed due to complexities/Impairments listed. [] Progression has been slowed due to co-morbidities. [] Plan just implemented, too soon to assess goals progression <30days   [] Goals require adjustment due to lack of progress  [] Patient is not progressing as expected and requires additional follow up with physician  [] Other    Prognosis for POC: [x] Good [] Fair  [] Poor      Patient requires continued skilled intervention: [x] Yes  [] No    Treatment/Activity Tolerance:  [x] Patient able to complete treatment  [] Patient limited by fatigue  [] Patient limited by pain     [] Patient limited by other medical complications  [] Other: added lateral step down today, pt notes fatiguing/challenging but is able to complete without pain. Continue to progress as tolerated.          Return to Play: (if applicable)   []  Stage 1: Intro to Strength   []  Stage 2: Return to Run and Strength   []  Stage 3: Return to Jump and Strength   []  Stage 4: Dynamic Strength and Agility   []  Stage 5: Sport Specific Training     []  Ready to Return to Play, Meets All Above Stages   []  Not Ready for

## 2021-09-14 ENCOUNTER — HOSPITAL ENCOUNTER (OUTPATIENT)
Dept: PHYSICAL THERAPY | Age: 51
Setting detail: THERAPIES SERIES
Discharge: HOME | End: 2021-09-14
Payer: COMMERCIAL

## 2021-09-14 PROCEDURE — 97530 THERAPEUTIC ACTIVITIES: CPT

## 2021-09-14 PROCEDURE — 97112 NEUROMUSCULAR REEDUCATION: CPT

## 2021-09-14 NOTE — FLOWSHEET NOTE
[address] [facility], [place]                     Physical Therapy Daily Treatment Note  Date:  2021    Patient Name:  Leanne Webber    :  1970  MRN: [id number]    Medical/Treatment Diagnosis Information:  · Diagnosis: M25.562 (ICD-10-CM) - Left knee pain, unspecified chronicity  · Treatment Diagnosis: M25.562 (ICD-10-CM) - Left knee pain, unspecified chronicity  Insurance/Certification information:  PT Insurance Information: [employer] [place]  Physician Information:  Referring Practitioner: SOLOMON Bowling  Has the plan of care been signed (Y/N):        []  Yes  [x]  No     Date of Patient follow up with Physician:       Is this a Progress Report:     []  Yes  [x]  No       Progress report will be due (10 Rx or 30 days whichever is less):       Recertification will be due (POC Duration  / 90 days whichever is less):          Visit # Insurance Allowable Auth Required   10 MN  []  Yes []  No      Functional Scale: WOMAC 22%     Date assessed:  21  Functional Scale: WOMAC 24%     Date assessed:  8/10/21      Latex Allergy:  [x]NO      []YES  Preferred Language for Healthcare:   [x]English       []other:    Pain level:  0-2/10     SUBJECTIVE:  Pt notes she is doing well.      OBJECTIVE: 21   Observation:    Test measurements:      Girth  RIGHT LEFT   10cm superior to patella 52 cm with quad set  49 cm w/ quad set   Midline patella       10cm inferior to patella               L R Comment   Hamstring         Gastroc         ITB         Quad                                   ROM PROM AROM Overpressure Comment     L R L R L R     Flexion     126 125         Extension     0 with quad set  0                                                   Strength L R Comment   Quad 49.0 61.3  lbs with hand held dynamometer   Hamstring  54.3  54     Gastroc         Hip flexor         Hip ABD                                   RESTRICTIONS/PRECAUTIONS:     Exercises/Interventions:     Exercise/Equipment Resistance/Repetitions Other comments   Stretching     Hamstring [id number]    Hip Flexion     ITB     Grion     Quad     Inclined Calf     Towel Pull          SLR    Supine    Prone    Abduction    Adducton    SLR+    Clams    LAQ              Isometrics     Quad sets  With BFR 8/19   Hip Adduction     Hamstring                    Patellar Glides     Medial     Superior     Inferior          ROM     Sheet Pulls     Wall Slides     Edge of bed     Flexionator     Extensionator     Hang Weights     Ankle Pumps                              CKC     Calf raises           Step up and over     Lateral Step Downs 3x10 L1    rockerboard  [id number] mini squat hold          Mini squats     CC TKE  4 plates 4H88 with step up on L1         Lateral band walks     Side Planks     Half moon     Single leg flow          Biodex-balance LS x3 level 7    Single leg stance [id number] airex    Plyoback          Stool scoots     SB bridge     SB HS Curls     Planks          PRE     Extension  RANGE: 90/40   Flexion  RANGE: 0/90        Cable Column          Leg Press  RANGE: 70/10        Bike 5 min ROM     Treadmill                 Access Code: [email or web address]  URL: [email or web address]  Date: 08/10/2021  Prepared by: Armani Castle    Exercises  Seated Table Hamstring Stretch - 1 x daily - 7 x weekly - 5 reps - 1 sets - 30 hold  Supine Active Straight Leg Raise - 1 x daily - 7 x weekly - 10 reps - 3 sets  Sidelying Hip Abduction - 1 x daily - 7 x weekly - 10 reps - 3 sets  Seated Long Arc Quad - 1 x daily - 7 x weekly - 3 sets - 10 reps  Single Leg Stance with Support - 1 x daily - 7 x weekly - 3 reps - 1 sets - 30 hold  Standing Terminal Knee Extension with Resistance - 1 x daily - 7 x weekly - 10 reps - 3 sets          Bloodflow restriction was utilized throughout exercise session with use of Janene Unit for a period of 8 minutes.   The Cuff was deflated every 10 minutes for reperfusion during treatment. The pateint was monitored for parathesia as well as poor tolerance throughout the treatment session. BFR Smart Phase Protocol                    BFR Session 09/14/21                          1 min rest period between each set of repetitions. 2 Min rest/reperfusion between    each exercises protocol perfromed                      BFR Locations L LE  BFR set at: 152 mmHg (80% )                   Protocol: 30/15/15/15           Exercises:   Reps 30s Reps 30s Reps 30s Reps Notes                      SLR 2# reps required  30  15  15  15     completed  30  15  15  15    LAQ 2# reps required  30  15  15  15     completed                          Therapeutic Exercise and NMR EXR  [] (22319) Provided verbal/tactile cueing for activities related to strengthening, flexibility, endurance, ROM for improvements in LE, proximal hip, and core control with self care, mobility, lifting, ambulation.  [] (27625) Provided verbal/tactile cueing for activities related to improving balance, coordination, kinesthetic sense, posture, motor skill, proprioception  to assist with LE, proximal hip, and core control in self care, mobility, lifting, ambulation and eccentric single leg control.      NMR and Therapeutic Activities:    [] (48136 or 02270) Provided verbal/tactile cueing for activities related to improving balance, coordination, kinesthetic sense, posture, motor skill, proprioception and motor activation to allow for proper function of core, proximal hip and LE with self care and ADLs  [] (49323) Gait Re-education- Provided training and instruction to the patient for proper LE, core and proximal hip recruitment and positioning and eccentric body weight control with ambulation re-education including up and down stairs     Home Exercise Program:    [x] (63395) Reviewed/Progressed HEP activities related to strengthening, flexibility, endurance, ROM of core, proximal hip and LE for functional self-care, mobility, lifting and ambulation/stair navigation   [] (51956)Reviewed/Progressed HEP activities related to improving balance, coordination, kinesthetic sense, posture, motor skill, proprioception of core, proximal hip and LE for self care, mobility, lifting, and ambulation/stair navigation      Manual Treatments:  PROM / STM / Oscillations-Mobs:  G-I, II, III, IV (PA's, Inf., Post.)  [] (17985) Provided manual therapy to mobilize LE, proximal hip and/or LS spine soft tissue/joints for the purpose of modulating pain, promoting relaxation,  increasing ROM, reducing/eliminating soft tissue swelling/inflammation/restriction, improving soft tissue extensibility and allowing for proper ROM for normal function with self care, mobility, lifting and ambulation. Modalities:      Charges:  Timed Code Treatment Minutes: 46   Total Treatment Minutes: 48     [] EVAL (LOW) 39297   [] EVAL (MOD) 05400   [] EVAL (HIGH) 98134   [] RE-EVAL   [] KI(16077) x     [] IONTO  [x] NMR (92242) x 1   [] VASO  [] Manual (25363) x      [] Other:  [x] TA x   2   [] Mech Traction (89175)  [] ES(attended) (30834)      [] ES (un) (17695):       GOALS:  Patient stated goal: use stairs normally, stop buckling from happening   [x] Progressing: [] Met: [] Not Met: [] Adjusted    Therapist goals for Patient:   Short Term Goals: To be achieved in: 2 weeks  1. Independent in HEP and progression per patient tolerance, in order to prevent re-injury. [x] Progressing: [] Met: [] Not Met: [] Adjusted   2. Patient will have a decrease in pain to facilitate improvement in movement, function, and ADLs as indicated by Functional Deficits. [] Progressing: [x] Met: [] Not Met: [] Adjusted    Long Term Goals: To be achieved in: 6-8 weeks  1. Disability index score of 12% or less for the [facility] to assist with reaching prior level of function. [] Progressing: [] Met: [] Not Met: [] Adjusted  2.  Patient will demonstrate increased AROM to wfl painfree  to allow for proper joint functioning as indicated by patients Functional Deficits. [] Progressing: [x] Met: [] Not Met: [] Adjusted  3. Patient will demonstrate an increase in Strength to within 10lbs of uninvolved side with handheld dynamometer to allow for proper functional mobility as indicated by patients Functional Deficits. [x] Progressing: [] Met: [] Not Met: [] Adjusted  4. Patient will return to reciprocal gait on stairs without increased symptoms or restriction. [x] Progressing: [] Met: [] Not Met: [] Adjusted    Overall Progression Towards Functional goals/ Treatment Progress Update:  [x] Patient is progressing as expected towards functional goals listed. [] Progression is slowed due to complexities/Impairments listed. [] Progression has been slowed due to co-morbidities. [] Plan just implemented, too soon to assess goals progression <30days   [] Goals require adjustment due to lack of progress  [] Patient is not progressing as expected and requires additional follow up with physician  [] Other    Prognosis for POC: [x] Good [] Fair  [] Poor      Patient requires continued skilled intervention: [x] Yes  [] No    Treatment/Activity Tolerance:  [x] Patient able to complete treatment  [] Patient limited by fatigue  [] Patient limited by pain     [] Patient limited by other medical complications  [] Other: pt able to progress bfr with increased resistance today. She notes fatigue at end of session but no increased pain. Continue to progress as tolerate.          Return to Play: (if applicable)   []  Stage 1: Intro to Strength   []  Stage 2: Return to Run and Strength   []  Stage 3: Return to Jump and Strength   []  Stage 4: Dynamic Strength and Agility   []  Stage 5: Sport Specific Training     []  Ready to Return to Play, Meets All Above Stages   []  Not Ready for Return to Sports   Comments:                               PLAN: Continue to progress BFR training and functional quad strengthening   [] Continue per plan of care [] Alter current plan (see comments above)  [x] Plan of care initiated [] Hold pending MD visit [] Discharge  Note: If patient does not return for scheduled/ recommended follow up visits, this note will serve as a discharge from care along with most recent update on progress. Reviewed insurance benefits for physical therapy in an outpatient hospital based setting with the patient, including deductible  and allowable visit number.  Pt was informed of possible out of pocket costs, as well as, informed of other service options for continuing supervised sessions without required skilled PT intervention such as the cash based [employer] program.     Electronically signed by:   Kunal Lopez, PT, DPT, Cert DN

## 2021-09-16 ENCOUNTER — HOSPITAL ENCOUNTER (OUTPATIENT)
Dept: PHYSICAL THERAPY | Age: 51
Setting detail: THERAPIES SERIES
Discharge: HOME | End: 2021-09-16
Payer: COMMERCIAL

## 2021-09-16 PROCEDURE — 97112 NEUROMUSCULAR REEDUCATION: CPT

## 2021-09-16 PROCEDURE — 97110 THERAPEUTIC EXERCISES: CPT

## 2021-09-16 NOTE — FLOWSHEET NOTE
[address] [facility], [place]                     Physical Therapy Daily Treatment Note  Date:  2021    Patient Name:  Eusebio Panchal    :  1970  MRN: [id number]    Medical/Treatment Diagnosis Information:  · Diagnosis: M25.562 (ICD-10-CM) - Left knee pain, unspecified chronicity  · Treatment Diagnosis: M25.562 (ICD-10-CM) - Left knee pain, unspecified chronicity  Insurance/Certification information:  PT Insurance Information: [employer] [place]  Physician Information:  Referring Practitioner: SOLOMON Clay  Has the plan of care been signed (Y/N):        []  Yes  [x]  No     Date of Patient follow up with Physician:       Is this a Progress Report:     []  Yes  [x]  No       Progress report will be due (10 Rx or 30 days whichever is less):       Recertification will be due (POC Duration  / 90 days whichever is less):          Visit # Insurance Allowable Auth Required   11 [place]  []  Yes []  No      Functional Scale: WOMAC 22%     Date assessed:  21  Functional Scale: WOMAC 24%     Date assessed:  8/10/21      Latex Allergy:  [x]NO      []YES  Preferred Language for Healthcare:   [x]English       []other:    Pain level:  0-2/10     SUBJECTIVE:  Pt reports doing well, slight soreness in mm after last session. Does have limited time for session today d/t work.      OBJECTIVE: 21   Observation:    Test measurements:      Girth  RIGHT LEFT   10cm superior to patella 52 cm with quad set  49 cm w/ quad set   Midline patella       10cm inferior to patella               L R Comment   Hamstring         Gastroc         ITB         Quad                                   ROM PROM AROM Overpressure Comment     L R L R L R     Flexion     126 125         Extension     0 with quad set  0                                                   Strength L R Comment   Quad 49.0 61.3  lbs with hand held dynamometer   Hamstring 54.3  54     Gastroc         Hip flexor         Hip ABD                                     RESTRICTIONS/PRECAUTIONS:     Exercises/Interventions:     Exercise/Equipment Resistance/Repetitions Other comments   Stretching     Hamstring [id number]    Hip Flexion     ITB     Grion     Quad     Inclined Calf     Towel Pull          SLR    Supine    Prone    Abduction    Adducton    SLR+    Clams    LAQ              Isometrics     Quad sets  With BFR 8/19   Hip Adduction     Hamstring                    Patellar Glides     Medial     Superior     Inferior          ROM     Sheet Pulls     Wall Slides     Edge of bed     Flexionator     Extensionator     Hang Weights     Ankle Pumps                              CKC     Calf raises           Step up and over     Lateral Step Downs    rockerboard         Mini squats    CC TKE        Lateral band walks    Side Planks    Half moon    Single leg flow        Biodex-balance    Single leg stance    Plyoback          Stool scoots     SB bridge     SB HS Curls     Planks          PRE     Extension  RANGE: 90/40   Flexion  RANGE: 0/90        Cable Column          Leg Press  RANGE: 70/10        Bike 5 min ROM     Treadmill                 Access Code: [email or web address]  URL: [email or web address]  Date: 08/10/2021  Prepared by: Ruffus Osgood    Exercises  Seated Table Hamstring Stretch - 1 x daily - 7 x weekly - 5 reps - 1 sets - 30 hold  Supine Active Straight Leg Raise - 1 x daily - 7 x weekly - 10 reps - 3 sets  Sidelying Hip Abduction - 1 x daily - 7 x weekly - 10 reps - 3 sets  Seated Long Arc Quad - 1 x daily - 7 x weekly - 3 sets - 10 reps  Single Leg Stance with Support - 1 x daily - 7 x weekly - 3 reps - 1 sets - 30 hold  Standing Terminal Knee Extension with Resistance - 1 x daily - 7 x weekly - 10 reps - 3 sets          Bloodflow restriction was utilized throughout exercise session with use of Janene Unit for a period of 8 minutes.   The Cuff was deflated every 10 minutes for reperfusion during treatment. The pateint was monitored for parathesia as well as poor tolerance throughout the treatment session. BFR Smart Phase Protocol                    BFR Session 09/16/21                          1 min rest period between each set of repetitions. 2 Min rest/reperfusion between    each exercises protocol perfromed                      BFR Locations L LE  BFR set at: 155 mmHg (69% ) 229 LOP                  Protocol: 30/15/15/15           Exercises:   Reps 30s Reps 30s Reps 30s Reps Notes                      SLR 2# reps required  30  15  15  15     completed  30  15  15  15    LAQ 2# reps required  30  15  15  15     completed            TKE 5 plates  reps required  30  15  15  15     completed  30  15  15  15            Therapeutic Exercise and NMR EXR  [] (42964) Provided verbal/tactile cueing for activities related to strengthening, flexibility, endurance, ROM for improvements in LE, proximal hip, and core control with self care, mobility, lifting, ambulation.  [] (17575) Provided verbal/tactile cueing for activities related to improving balance, coordination, kinesthetic sense, posture, motor skill, proprioception  to assist with LE, proximal hip, and core control in self care, mobility, lifting, ambulation and eccentric single leg control.      NMR and Therapeutic Activities:    [] (63665 or 83668) Provided verbal/tactile cueing for activities related to improving balance, coordination, kinesthetic sense, posture, motor skill, proprioception and motor activation to allow for proper function of core, proximal hip and LE with self care and ADLs  [] (33444) Gait Re-education- Provided training and instruction to the patient for proper LE, core and proximal hip recruitment and positioning and eccentric body weight control with ambulation re-education including up and down stairs     Home Exercise Program:    [x] (09443) Reviewed/Progressed HEP activities related to strengthening, flexibility, endurance, ROM of core, proximal hip and LE for functional self-care, mobility, lifting and ambulation/stair navigation   [] (11298)Reviewed/Progressed HEP activities related to improving balance, coordination, kinesthetic sense, posture, motor skill, proprioception of core, proximal hip and LE for self care, mobility, lifting, and ambulation/stair navigation      Manual Treatments:  PROM / STM / Oscillations-Mobs:  G-I, II, III, IV (PA's, Inf., Post.)  [] (72883) Provided manual therapy to mobilize LE, proximal hip and/or LS spine soft tissue/joints for the purpose of modulating pain, promoting relaxation,  increasing ROM, reducing/eliminating soft tissue swelling/inflammation/restriction, improving soft tissue extensibility and allowing for proper ROM for normal function with self care, mobility, lifting and ambulation. Modalities:      Charges:  Timed Code Treatment Minutes: 38   Total Treatment Minutes: 38     [] EVAL (LOW) 89794   [] EVAL (MOD) 32696   [] EVAL (HIGH) 73069   [] RE-EVAL   [x] RD(21321) x 1    [] IONTO  [x] NMR (42999) x 2   [] VASO  [] Manual (50603) x      [] Other:  [] TA x      [] Mech Traction (00466)  [] ES(attended) (14924)      [] ES (un) (79299):       GOALS:  Patient stated goal: use stairs normally, stop buckling from happening   [x] Progressing: [] Met: [] Not Met: [] Adjusted    Therapist goals for Patient:   Short Term Goals: To be achieved in: 2 weeks  1. Independent in HEP and progression per patient tolerance, in order to prevent re-injury. [x] Progressing: [] Met: [] Not Met: [] Adjusted   2. Patient will have a decrease in pain to facilitate improvement in movement, function, and ADLs as indicated by Functional Deficits. [] Progressing: [x] Met: [] Not Met: [] Adjusted    Long Term Goals: To be achieved in: 6-8 weeks  1. Disability index score of 12% or less for the [facility] to assist with reaching prior level of function.    [] Progressing: [] Met: [] Not Met: [] Adjusted  2. Patient will demonstrate increased AROM to wfl painfree  to allow for proper joint functioning as indicated by patients Functional Deficits. [] Progressing: [x] Met: [] Not Met: [] Adjusted  3. Patient will demonstrate an increase in Strength to within 10lbs of uninvolved side with handheld dynamometer to allow for proper functional mobility as indicated by patients Functional Deficits. [x] Progressing: [] Met: [] Not Met: [] Adjusted  4. Patient will return to reciprocal gait on stairs without increased symptoms or restriction. [x] Progressing: [] Met: [] Not Met: [] Adjusted    Overall Progression Towards Functional goals/ Treatment Progress Update:  [x] Patient is progressing as expected towards functional goals listed. [] Progression is slowed due to complexities/Impairments listed. [] Progression has been slowed due to co-morbidities. [] Plan just implemented, too soon to assess goals progression <30days   [] Goals require adjustment due to lack of progress  [] Patient is not progressing as expected and requires additional follow up with physician  [] Other    Prognosis for POC: [x] Good [] Fair  [] Poor      Patient requires continued skilled intervention: [x] Yes  [] No    Treatment/Activity Tolerance:  [x] Patient able to complete treatment  [] Patient limited by fatigue  [] Patient limited by pain     [] Patient limited by other medical complications  [] Other:  Dt time constraint only performed BFR today. Pt was fatigued at end. Reduce frequency to 1x per week starting next week to begin to wean towards independent HEP.          Return to Play: (if applicable)   []  Stage 1: Intro to Strength   []  Stage 2: Return to Run and Strength   []  Stage 3: Return to Jump and Strength   []  Stage 4: Dynamic Strength and Agility   []  Stage 5: Sport Specific Training     []  Ready to Return to Play, Meets All Above Stages   []  Not Ready for Return to Sports   Comments: PLAN: Continue to progress BFR training and functional quad strengthening   [] Continue per plan of care [] Alter current plan (see comments above)  [x] Plan of care initiated [] Hold pending MD visit [] Discharge  Note: If patient does not return for scheduled/ recommended follow up visits, this note will serve as a discharge from care along with most recent update on progress. Reviewed insurance benefits for physical therapy in an outpatient hospital based setting with the patient, including deductible  and allowable visit number.  Pt was informed of possible out of pocket costs, as well as, informed of other service options for continuing supervised sessions without required skilled PT intervention such as the cash based [employer] program.     Electronically signed by:   Dulce Velez, PT, DPT, Cert DN

## 2021-09-21 ENCOUNTER — HOSPITAL ENCOUNTER (OUTPATIENT)
Dept: PHYSICAL THERAPY | Age: 51
Setting detail: THERAPIES SERIES
Discharge: HOME | End: 2021-09-21
Payer: COMMERCIAL

## 2021-09-21 PROCEDURE — 97530 THERAPEUTIC ACTIVITIES: CPT

## 2021-09-21 PROCEDURE — 97112 NEUROMUSCULAR REEDUCATION: CPT

## 2021-09-21 PROCEDURE — 97110 THERAPEUTIC EXERCISES: CPT

## 2021-09-21 NOTE — FLOWSHEET NOTE
[address] [facility], [place]                     Physical Therapy Daily Treatment Note  Date:  2021    Patient Name:  Krystina Calloway    :  1970  MRN: [id number]    Medical/Treatment Diagnosis Information:  · Diagnosis: M25.562 (ICD-10-CM) - Left knee pain, unspecified chronicity  · Treatment Diagnosis: M25.562 (ICD-10-CM) - Left knee pain, unspecified chronicity  Insurance/Certification information:  PT Insurance Information: [employer] [place]  Physician Information:  Referring Practitioner: SOLOMON Fisher  Has the plan of care been signed (Y/N):        []  Yes  [x]  No     Date of Patient follow up with Physician:       Is this a Progress Report:     []  Yes  [x]  No       Progress report will be due (10 Rx or 30 days whichever is less):       Recertification will be due (POC Duration  / 90 days whichever is less):          Visit # Insurance Allowable Auth Required   11 [place]  []  Yes []  No      Functional Scale: WOMAC 22%     Date assessed:  21  Functional Scale: WOMAC 24%     Date assessed:  8/10/21      Latex Allergy:  [x]NO      []YES  Preferred Language for Healthcare:   [x]English       []other:    Pain level:  0-2/10     SUBJECTIVE:  Pt reports doing well, slight soreness in mm after last session.      OBJECTIVE: 21   Observation:    Test measurements:      Girth  RIGHT LEFT   10cm superior to patella 52 cm with quad set  49 cm w/ quad set   Midline patella       10cm inferior to patella               L R Comment   Hamstring         Gastroc         ITB         Quad                                   ROM PROM AROM Overpressure Comment     L R L R L R     Flexion     126 125         Extension     0 with quad set  0                                                   Strength L R Comment   Quad 49.0 61.3  lbs with hand held dynamometer   Hamstring  54.3  54     Gastroc         Hip flexor         Hip ABD                                     RESTRICTIONS/PRECAUTIONS:     Exercises/Interventions:     Exercise/Equipment Resistance/Repetitions Other comments   Stretching     Hamstring [id number]    Hip Flexion     ITB     Grion     Quad     Inclined Calf     Towel Pull          SLR    Supine    Prone    Abduction    Adducton    SLR+    Clams    LAQ              Isometrics     Quad sets  With BFR 8/19   Hip Adduction     Hamstring                    Patellar Glides     Medial     Superior     Inferior          ROM     Sheet Pulls     Wall Slides     Edge of bed     Flexionator     Extensionator     Hang Weights     Ankle Pumps                              CKC     Calf raises     Wall [id number]        Step up and over     Lateral Step Downs 3x10 L1    rockerboard  [id number] mini squat hold          Mini squats 3x10 to chair with airex     CC TKE  4 plates 6N40 with step up on L1         Lateral band walks     Side Planks     Half moon     Single leg flow SL DL 3x10          Biodex-balance LS x3 level 7    Single leg stance [id number] airex    Plyoback          Stool scoots     SB bridge     SB HS Curls     Planks          PRE     Extension  RANGE: 90/40   Flexion  RANGE: 0/90        Cable Column          Leg Press  RANGE: 70/10        Bike 5 min ROM     Treadmill                 Access Code: [email or web address]  URL: [email or web address]  Date: 09/21/2021  Prepared by: Mary Beth Martin    Exercises  Seated Table Hamstring Stretch - 1 x daily - 7 x weekly - 5 reps - 1 sets - 30 hold  Supine Active Straight Leg Raise - 1 x daily - 7 x weekly - 3 reps - 30 hold  Seated Long Arc Quad - 1 x daily - 7 x weekly - 3 sets - 10 reps  Standing Terminal Knee Extension with Resistance - 1 x daily - 7 x weekly - 10 reps - 3 sets  Squat with Chair Touch - 1 x daily - 7 x weekly - 3 sets - 10 reps  Lateral Step Down - 1 x daily - 7 x weekly - 3 sets - 10 reps  Forward T with Counter Support - 1 x daily - 7 x weekly - 3 sets - 10 reps  Single Leg Stance on Foam Pad - 1 x daily - 7 x weekly - 3 sets - 30 hold        Bloodflow restriction was utilized throughout exercise session with use of Janene Unit for a period of 8 minutes. The Cuff was deflated every 10 minutes for reperfusion during treatment. The pateint was monitored for parathesia as well as poor tolerance throughout the treatment session. BFR Smart Phase Protocol                            Therapeutic Exercise and NMR EXR  [] (54578) Provided verbal/tactile cueing for activities related to strengthening, flexibility, endurance, ROM for improvements in LE, proximal hip, and core control with self care, mobility, lifting, ambulation.  [] (43900) Provided verbal/tactile cueing for activities related to improving balance, coordination, kinesthetic sense, posture, motor skill, proprioception  to assist with LE, proximal hip, and core control in self care, mobility, lifting, ambulation and eccentric single leg control.      NMR and Therapeutic Activities:    [] (02937 or 97185) Provided verbal/tactile cueing for activities related to improving balance, coordination, kinesthetic sense, posture, motor skill, proprioception and motor activation to allow for proper function of core, proximal hip and LE with self care and ADLs  [] (29293) Gait Re-education- Provided training and instruction to the patient for proper LE, core and proximal hip recruitment and positioning and eccentric body weight control with ambulation re-education including up and down stairs     Home Exercise Program:    [x] (09427) Reviewed/Progressed HEP activities related to strengthening, flexibility, endurance, ROM of core, proximal hip and LE for functional self-care, mobility, lifting and ambulation/stair navigation   [] (18245)Reviewed/Progressed HEP activities related to improving balance, coordination, kinesthetic sense, posture, motor skill, proprioception of core, proximal hip and LE for self care, mobility, lifting, and ambulation/stair navigation      Manual Treatments:  PROM / STM / Oscillations-Mobs:  G-I, II, III, IV (PA's, Inf., Post.)  [] (53419) Provided manual therapy to mobilize LE, proximal hip and/or LS spine soft tissue/joints for the purpose of modulating pain, promoting relaxation,  increasing ROM, reducing/eliminating soft tissue swelling/inflammation/restriction, improving soft tissue extensibility and allowing for proper ROM for normal function with self care, mobility, lifting and ambulation. Modalities:      Charges:  Timed Code Treatment Minutes: 38   Total Treatment Minutes: 38     [] EVAL (LOW) 59764   [] EVAL (MOD) 15168   [] EVAL (HIGH) 08169   [] RE-EVAL   [x] EQ(42272) x 1    [] IONTO  [x] NMR (91556) x 1   [] VASO  [] Manual (14013) x      [] Other:  [x] TA x  1    [] Mech Traction (20333)  [] ES(attended) (63471)      [] ES (un) (98676):       GOALS:  Patient stated goal: use stairs normally, stop buckling from happening   [x] Progressing: [] Met: [] Not Met: [] Adjusted    Therapist goals for Patient:   Short Term Goals: To be achieved in: 2 weeks  1. Independent in HEP and progression per patient tolerance, in order to prevent re-injury. [x] Progressing: [] Met: [] Not Met: [] Adjusted   2. Patient will have a decrease in pain to facilitate improvement in movement, function, and ADLs as indicated by Functional Deficits. [] Progressing: [x] Met: [] Not Met: [] Adjusted    Long Term Goals: To be achieved in: 6-8 weeks  1. Disability index score of 12% or less for the [facility] to assist with reaching prior level of function. [] Progressing: [] Met: [] Not Met: [] Adjusted  2. Patient will demonstrate increased AROM to wfl painfree  to allow for proper joint functioning as indicated by patients Functional Deficits. [] Progressing: [x] Met: [] Not Met: [] Adjusted  3.  Patient will demonstrate an increase in Strength to within 10lbs of uninvolved side with handheld dynamometer to allow for proper functional mobility as indicated by patients Functional Deficits. [x] Progressing: [] Met: [] Not Met: [] Adjusted  4. Patient will return to reciprocal gait on stairs without increased symptoms or restriction. [x] Progressing: [] Met: [] Not Met: [] Adjusted    Overall Progression Towards Functional goals/ Treatment Progress Update:  [x] Patient is progressing as expected towards functional goals listed. [] Progression is slowed due to complexities/Impairments listed. [] Progression has been slowed due to co-morbidities. [] Plan just implemented, too soon to assess goals progression <30days   [] Goals require adjustment due to lack of progress  [] Patient is not progressing as expected and requires additional follow up with physician  [] Other    Prognosis for POC: [x] Good [] Fair  [] Poor      Patient requires continued skilled intervention: [x] Yes  [] No    Treatment/Activity Tolerance:  [x] Patient able to complete treatment  [] Patient limited by fatigue  [] Patient limited by pain     [] Patient limited by other medical complications  [] Other:  Pt notes fatigue at end of session, she does have improved quad control with lateral step downs although it is shaky.           Return to Play: (if applicable)   []  Stage 1: Intro to Strength   []  Stage 2: Return to Run and Strength   []  Stage 3: Return to Jump and Strength   []  Stage 4: Dynamic Strength and Agility   []  Stage 5: Sport Specific Training     []  Ready to Return to Play, Meets All Above Stages   []  Not Ready for Return to Sports   Comments:                               PLAN: Continue to progress BFR training and functional quad strengthening   [] Continue per plan of care [] Alter current plan (see comments above)  [x] Plan of care initiated [] Hold pending MD visit [] Discharge  Note: If patient does not return for scheduled/ recommended follow up visits, this note will serve as a discharge from care along with most recent update on progress. Reviewed insurance benefits for physical therapy in an outpatient hospital based setting with the patient, including deductible  and allowable visit number.  Pt was informed of possible out of pocket costs, as well as, informed of other service options for continuing supervised sessions without required skilled PT intervention such as the cash based [employer] program.     Electronically signed by:   Avelina Patricia, PT, DPT, Cert DN

## 2021-09-28 ENCOUNTER — HOSPITAL ENCOUNTER (OUTPATIENT)
Dept: PHYSICAL THERAPY | Age: 51
Setting detail: THERAPIES SERIES
Discharge: HOME | End: 2021-09-28
Payer: COMMERCIAL

## 2021-09-28 PROCEDURE — 97110 THERAPEUTIC EXERCISES: CPT

## 2021-09-28 PROCEDURE — 97530 THERAPEUTIC ACTIVITIES: CPT

## 2021-09-28 NOTE — PLAN OF CARE
Jamaal Ayala [address]    Physical Therapy Re-Certification Plan of Care/MD UPDATE      Dear  SOLOMON Corrigan    We had the pleasure of treating the following patient for physical therapy services at [address] [place] [address]. A summary of our findings can be found in the updated assessment below. This includes our plan of care. If you have any questions or concerns regarding these findings, please do not hesitate to contact me at the office phone number checked above. Thank you for the referral.     Physician Signature:________________________________Date:__________________  By signing above (or electronic signature), therapists plan is approved by physician    Date Range Of Visits: -21  Total Visits to Date: 12  Overall Response to Treatment:   []Patient is responding well to treatment and improvement is noted with regards  to goals   []Patient should continue to improve in reasonable time if they continue HEP   []Patient has plateaued and is no longer responding to skilled PT intervention    []Patient is getting worse and would benefit from return to referring MD   []Patient unable to adhere to initial POC   []Other: Pt shows improved ROM, quad strengthening and girth of quad on LLE. She notes she feels more stable in the left knee with ambulation and stairs. She is independent with HEP and will continue to progress with strength as she continues on her own. At this time, skilled intervention is no longer required.  DC PT                       Physical Therapy Daily Treatment Note  Date:  2021    Patient Name:  Balta Leigh    :  1970  MRN: [id number]    Medical/Treatment Diagnosis Information:  · Diagnosis: M25.562 (ICD-10-CM) - Left knee pain, unspecified chronicity  · Treatment Diagnosis: M25.562 (ICD-10-CM) - Left knee pain, unspecified chronicity  Insurance/Certification information:  PT Insurance Information: [employer] [place]  Physician Information:  Referring Practitioner: SOLOMON Liu  Has the plan of care been signed (Y/N):        []  Yes  [x]  No     Date of Patient follow up with Physician:       Is this a Progress Report:     []  Yes  [x]  No       Progress report will be due (10 Rx or 30 days whichever is less): 92/8/79      Recertification will be due (POC Duration  / 90 days whichever is less):          Visit # Insurance Allowable Auth Required   12 [place]  []  Yes []  No      Functional Scale: WOMAC 12%     Date assessed:  9/28/21  Functional Scale: WOMAC 22%     Date assessed:  9/14/21  Functional Scale: WOMAC 24%     Date assessed:  8/10/21      Latex Allergy:  [x]NO      []YES  Preferred Language for Healthcare:   [x]English       []other:    Pain level:  0-2/10     SUBJECTIVE:  Pt notes she is doing well, feels improvement in knee. She notes that after a long day of being on her feet she will still feel her knee but otherwise is doing well.      OBJECTIVE: 9/28/21   Observation:    Test measurements:      Girth  RIGHT LEFT   10cm superior to patella 52 cm with quad set  49.5 cm w/ quad set   Midline patella       10cm inferior to patella               L R Comment   Hamstring         Gastroc         ITB         Quad                                   ROM PROM AROM Overpressure Comment     L R L R L R     Flexion     126 125         Extension     0 with quad set  0                                                   Strength L R Comment   Quad 53 61.3  lbs with hand held dynamometer   Hamstring  54.3  54     Gastroc         Hip flexor         Hip ABD                                     RESTRICTIONS/PRECAUTIONS:     Exercises/Interventions:     Exercise/Equipment Resistance/Repetitions Other comments   Stretching     Hamstring [id number]    Hip Flexion     ITB     Grion     Quad     Inclined Calf     Towel Pull          SLR    Supine    Prone    Abduction    Adducton    SLR+ Clams    LAQ              Isometrics     Quad sets  With BFR 8/19   Hip Adduction     Hamstring                    Patellar Glides     Medial     Superior     Inferior          ROM     Sheet Pulls     Wall Slides     Edge of bed     Flexionator     Extensionator     Hang Weights     Ankle Pumps                              CKC     Calf raises     Wall [id number]        Step up and over     Lateral Step Downs 3x10 L1            Mini squats 3x10 to chair with airex     CC TKE  4 plates 0K96 with step up on L1         Lateral band walks     Side Planks     Half moon     Single leg flow SL DL 3x10          Biodex-balance LS x3 level 7    Single leg stance [id number] airex    Plyoback          Stool scoots     SB bridge     SB HS Curls     Planks          PRE     Extension  RANGE: 90/40   Flexion  RANGE: 0/90        Cable Column          Leg Press  RANGE: 70/10        Bike 5 min ROM     Treadmill                 Access Code: [email or web address]  URL: [email or web address]  Date: 09/21/2021  Prepared by: Amina Issa    Exercises  Seated Table Hamstring Stretch - 1 x daily - 7 x weekly - 5 reps - 1 sets - 30 hold  Supine Active Straight Leg Raise - 1 x daily - 7 x weekly - 3 reps - 30 hold  Seated Long Arc Quad - 1 x daily - 7 x weekly - 3 sets - 10 reps  Standing Terminal Knee Extension with Resistance - 1 x daily - 7 x weekly - 10 reps - 3 sets  Squat with Chair Touch - 1 x daily - 7 x weekly - 3 sets - 10 reps  Lateral Step Down - 1 x daily - 7 x weekly - 3 sets - 10 reps  Forward T with Counter Support - 1 x daily - 7 x weekly - 3 sets - 10 reps  Single Leg Stance on Foam Pad - 1 x daily - 7 x weekly - 3 sets - 30 hold        Bloodflow restriction was utilized throughout exercise session with use of Janene Unit for a period of 8 minutes. The Cuff was deflated every 10 minutes for reperfusion during treatment. The pateint was monitored for parathesia as well as poor tolerance throughout the treatment session. BFR Smart Phase Protocol                            Therapeutic Exercise and NMR EXR  [] (43196) Provided verbal/tactile cueing for activities related to strengthening, flexibility, endurance, ROM for improvements in LE, proximal hip, and core control with self care, mobility, lifting, ambulation.  [] (34083) Provided verbal/tactile cueing for activities related to improving balance, coordination, kinesthetic sense, posture, motor skill, proprioception  to assist with LE, proximal hip, and core control in self care, mobility, lifting, ambulation and eccentric single leg control.      NMR and Therapeutic Activities:    [] (37191 or 40896) Provided verbal/tactile cueing for activities related to improving balance, coordination, kinesthetic sense, posture, motor skill, proprioception and motor activation to allow for proper function of core, proximal hip and LE with self care and ADLs  [] (12375) Gait Re-education- Provided training and instruction to the patient for proper LE, core and proximal hip recruitment and positioning and eccentric body weight control with ambulation re-education including up and down stairs     Home Exercise Program:    [x] (37820) Reviewed/Progressed HEP activities related to strengthening, flexibility, endurance, ROM of core, proximal hip and LE for functional self-care, mobility, lifting and ambulation/stair navigation   [] (54027)Reviewed/Progressed HEP activities related to improving balance, coordination, kinesthetic sense, posture, motor skill, proprioception of core, proximal hip and LE for self care, mobility, lifting, and ambulation/stair navigation      Manual Treatments:  PROM / STM / Oscillations-Mobs:  G-I, II, III, IV (PA's, Inf., Post.)  [] (22938) Provided manual therapy to mobilize LE, proximal hip and/or LS spine soft tissue/joints for the purpose of modulating pain, promoting relaxation,  increasing ROM, reducing/eliminating soft tissue swelling/inflammation/restriction, improving soft tissue extensibility and allowing for proper ROM for normal function with self care, mobility, lifting and ambulation. Modalities:      Charges:  Timed Code Treatment Minutes: 32   Total Treatment Minutes: 32     [] EVAL (LOW) 95658   [] EVAL (MOD) 63667   [] EVAL (HIGH) 78649   [] RE-EVAL   [x] CA(37080) x 1    [] IONTO  [] NMR (47240) x   [] VASO  [] Manual (43855) x      [] Other:  [x] TA x  1    [] Mech Traction (94357)  [] ES(attended) (85227)      [] ES (un) (02562):       GOALS:  Patient stated goal: use stairs normally, stop buckling from happening   [] Progressing: [x] Met: [] Not Met: [] Adjusted    Therapist goals for Patient:   Short Term Goals: To be achieved in: 2 weeks  1. Independent in HEP and progression per patient tolerance, in order to prevent re-injury. [] Progressing: [x] Met: [] Not Met: [] Adjusted   2. Patient will have a decrease in pain to facilitate improvement in movement, function, and ADLs as indicated by Functional Deficits. [] Progressing: [x] Met: [] Not Met: [] Adjusted    Long Term Goals: To be achieved in: 6-8 weeks  1. Disability index score of 12% or less for the [facility] to assist with reaching prior level of function. [] Progressing: [x] Met: [] Not Met: [] Adjusted  2. Patient will demonstrate increased AROM to wfl painfree  to allow for proper joint functioning as indicated by patients Functional Deficits. [] Progressing: [x] Met: [] Not Met: [] Adjusted  3. Patient will demonstrate an increase in Strength to within 10lbs of uninvolved side with handheld dynamometer to allow for proper functional mobility as indicated by patients Functional Deficits. [] Progressing: [x] Met: [] Not Met: [] Adjusted  4. Patient will return to reciprocal gait on stairs without increased symptoms or restriction.    [] Progressing: [x] Met: [] Not Met: [] Adjusted    Overall Progression Towards Functional goals/ Treatment Progress Update:  [x] Patient is progressing as expected towards functional goals listed. [] Progression is slowed due to complexities/Impairments listed. [] Progression has been slowed due to co-morbidities. [] Plan just implemented, too soon to assess goals progression <30days   [] Goals require adjustment due to lack of progress  [] Patient is not progressing as expected and requires additional follow up with physician  [] Other    Prognosis for POC: [x] Good [] Fair  [] Poor      Patient requires continued skilled intervention: [x] Yes  [] No    Treatment/Activity Tolerance:  [x] Patient able to complete treatment  [] Patient limited by fatigue  [] Patient limited by pain     [] Patient limited by other medical complications  [] Other:  Pt shows improved ROM, quad strengthening and girth of quad on LLE. She notes she feels more stable in the left knee with ambulation and stairs. She is independent with HEP and will continue to progress with strength as she continues on her own. At this time, skilled intervention is no longer required. DC PT         Return to Play: (if applicable)   []  Stage 1: Intro to Strength   []  Stage 2: Return to Run and Strength   []  Stage 3: Return to Jump and Strength   []  Stage 4: Dynamic Strength and Agility   []  Stage 5: Sport Specific Training     []  Ready to Return to Play, Meets All Above Stages   []  Not Ready for Return to Sports   Comments:                               PLAN:   [] Continue per plan of care [] Alter current plan (see comments above)  [] Plan of care initiated [] Hold pending MD visit [x] Discharge  Note: If patient does not return for scheduled/ recommended follow up visits, this note will serve as a discharge from care along with most recent update on progress. Reviewed insurance benefits for physical therapy in an outpatient hospital based setting with the patient, including deductible  and allowable visit number.  Pt was informed of possible out of pocket costs, as well as, informed of other service options for continuing supervised sessions without required skilled PT intervention such as the cash based [employer] program.     Electronically signed by:   Asha Appiah, PT, DPT, Cert DN

## 2021-10-01 ENCOUNTER — TELEPHONE (OUTPATIENT)
Dept: BARIATRICS/WEIGHT MGMT | Age: 51
End: 2021-10-01

## 2021-10-01 ENCOUNTER — TELEMEDICINE (OUTPATIENT)
Dept: BARIATRICS/WEIGHT MGMT | Age: 51
End: 2021-10-01
Payer: COMMERCIAL

## 2021-10-01 DIAGNOSIS — Z71.3 DIETARY COUNSELING AND SURVEILLANCE: ICD-10-CM

## 2021-10-01 DIAGNOSIS — E66.01 MORBID OBESITY WITH BMI OF 40.0-44.9, ADULT (HCC): Primary | ICD-10-CM

## 2021-10-01 PROCEDURE — 99213 OFFICE O/P EST LOW 20 MIN: CPT | Performed by: FAMILY MEDICINE

## 2021-10-01 RX ORDER — NALTREXONE HYDROCHLORIDE AND BUPROPION HYDROCHLORIDE 8; 90 MG/1; MG/1
2 TABLET, EXTENDED RELEASE ORAL 2 TIMES DAILY
Qty: 120 TABLET | Refills: 0 | Status: SHIPPED | OUTPATIENT
Start: 2021-10-01 | End: 2022-08-16

## 2021-10-01 ASSESSMENT — ENCOUNTER SYMPTOMS
CHEST TIGHTNESS: 0
APNEA: 0
DIARRHEA: 0
NAUSEA: 0
BLOOD IN STOOL: 0
ABDOMINAL PAIN: 0
COUGH: 0
CONSTIPATION: 0
EYE PAIN: 0
PHOTOPHOBIA: 0
WHEEZING: 0
VOMITING: 0
CHOKING: 0
ABDOMINAL DISTENTION: 0
SHORTNESS OF BREATH: 0

## 2021-10-01 NOTE — PROGRESS NOTES
Patient: Lety Winter                      Encounter Date: 10/1/2021    YOB: 1970                Age: 46 y.o. Chief Complaint   Patient presents with    Weight Management     F/u MARVIN- Contrave         Patient identification was verified at the start of the visit. Patient-Reported Vitals 12/9/2020   Patient-Reported Weight 284.0lbs   Patient-Reported Height 5 9.5   Patient-Reported Temperature 97.8         BP Readings from Last 1 Encounters:   08/12/21 111/85       BMI Readings from Last 1 Encounters:   08/12/21 41.35 kg/m²       Pulse Readings from Last 1 Encounters:   07/23/21 68           Wt Readings from Last 3 Encounters:   08/12/21 280 lb (127 kg)   08/05/21 280 lb (127 kg)   07/23/21 285 lb (129.3 kg)       Self-reported weight: 280 pounds     HPI: 46 y.o. female with a long-standing history of obesity presents today for virtual video follow-up. Her weight is stable since her last visit. Current treatment includes Contrave and low carb/vernell diet. Tolerating it well. Food recall reviewed with the patient. Making good dietary choices. B: Lowfat yogurt with fruit, black coffee  S: String cheese  L: Salad + protein   D: Grilled protein +  Low carb vegetables    Couple of beers on weekends     Drinking at least 64 oz water/day      Medication(s): Appetite well controlled? [x]Yes      []No    Focus:     [x]Good     []Fair     []Poor    Side effects? No      Any recent change in medication(s)?  No     Exercise: [x]Cardio-light     []Resistance/strength training     []Other:     No Known Allergies      Current Outpatient Medications:     naltrexone-buPROPion (CONTRAVE) 8-90 MG per extended release tablet, Take 2 tablets by mouth 2 times daily, Disp: 120 tablet, Rfl: 0    lisinopril-hydroCHLOROthiazide (PRINZIDE;ZESTORETIC) 20-25 MG per tablet, TAKE 1 TABLET BY MOUTH ONE TIME A DAY --DUE FOR SIX MONTH VISIT WITH DR DEAN IN JULY--, Disp: 90 tablet, Rfl: 0    naproxen (NAPROSYN) 250 MG 03/19/2021 160  ms Final    QRS Duration 03/19/2021 100  ms Final    Q-T Interval 03/19/2021 452  ms Final    QTc Calculation (Bazett) 03/19/2021 462  ms Final    P Axis 03/19/2021 46  degrees Final    R Axis 03/19/2021 -28  degrees Final    T Axis 03/19/2021 11  degrees Final    Diagnosis 03/19/2021 Normal sinus rhythmLeftward axisOtherwise normal ECGNo previous ECGs availableConfirmed by CORRIE BENZ, [employer] ([id number]) on 3/19/2021 11:20:09 AM   Final         Assessment and Plan:  1. Morbid obesity with BMI of 40.0-44.9, adult (HCC)  Stable. Continue low carb diet and increase exercise. Avoid alcohol. Contrave refilled. F/u 4 weeks. 2. Dietary counseling and surveillance  As above. Nutrition Plan: [] LCHF/Ketogenic   [x] Modified low-calorie diet (low carb/low-vernell)               [] Low-calorie diet    [] Maintenance       []Other        Exercise: [x] Cardio     [] Resistance/strength training                       [x] ACSM recommendations (150 minutes/week in active weight loss)               Behavior: [x] Motivational interviewing performed    [] Referralfor counseling                         [x] Discussed strategies to overcome habits/challenges for focus         [] Stress management   [x] Stimulus control         [] Sleep hygiene      Reviewed:  [x] Nutrition and the importance of regular protein intake  [x] Hidden carbohydrate sources  [x] Alcohol use  [x] Tobacco use   [x] Drug use- Denies  [x] Importance of exercise and reducingsedentary time  [x] Treatment consent- Patient understands and agrees with the treatment plan   [x] Proper use of medication and side effects      Controlled Substance Monitoring:    No flowsheet data found.        Patient denies any history of cardiovascular disease (e.g., CAD, stroke, arrhythmias, CHF, uncontrolled HTN), seizure disorder, MAOI use within the last 2 weeks, hyperthyroidism, glaucoma, agitated states, history of drug abuse, pregnancy, nursing, known hypersensitivity to the prescribing meds, history of pancreatitis, or personal or family history of thyroid medullary cancer. Treatment start date: 8/1/21  12 weeks: 11/1/21  Starting weight: 282 pounds   Goal: At least 5-10% (14-28 pounds)  Total weight loss: 2 pounds     Key dietary points:    - Meats (preferably organic or grass fed) are great sources of protein and have no carbohydrates. - Recommend coconut, olive, avocado, or almond oils. - When buying dairy, choose regular or full fat options. - Choose vegetables that grow above ground as they are generally lower in carbohydrates and higher in fiber.  - Avoid starches such as bread, rice, potatoes, pasta and all sources of simple sugars (desserts, soda, breakfast cereals). - Choose beverages that are calorie and sugar free. Reminder regarding weight loss medications: You must be seen in office every 2-4 weeks to haveyour prescriptions refilled. If you are off of your medication for longer than 7 days, you will not be able to restart the medication for at least 6 months. Always call our office to report any side effects. Females, it is your responsibility to obtain negative pregnancy tests each month. No orders of the defined types were placed in this encounter. No follow-ups on file. Jacques Benjamin is a 46 y.o. female being evaluated by a Virtual Visit (video visit) encounter to address concerns as mentioned above. A caregiver was present when appropriate. Due to this being a TeleHealth encounter (During [place] [address] public health emergency), evaluation of the following organ systems was limited: Vitals/Constitutional/EENT/Resp/CV/GI//MS/Neuro/Skin/Heme-Lymph-Imm.   Pursuant to the emergency declaration under the [facility] waiver authority and the [employer] Act, this Virtual Visit was conducted with patient's (and/or legal guardian's) consent, to reduce the patient's risk of exposure to COVID-19 and provide necessary medical care. The patient (and/or legal guardian) has also been advised to contact this office for worsening conditions or problems, and seek emergency medical treatment and/or call 911 if deemed necessary.

## 2021-12-07 ENCOUNTER — HOSPITAL ENCOUNTER (OUTPATIENT)
Dept: MAMMOGRAPHY | Age: 51
Discharge: HOME | End: 2021-12-07
Payer: COMMERCIAL

## 2021-12-07 ENCOUNTER — HOSPITAL ENCOUNTER (OUTPATIENT)
Dept: ULTRASOUND IMAGING | Age: 51
Discharge: HOME | End: 2021-12-07
Payer: COMMERCIAL

## 2021-12-07 VITALS — HEIGHT: 69 IN | WEIGHT: 280 LBS | BODY MASS INDEX: 41.47 KG/M2

## 2021-12-07 DIAGNOSIS — Z85.3 HISTORY OF RIGHT BREAST CANCER: ICD-10-CM

## 2021-12-07 DIAGNOSIS — N63.0 BREAST LUMP: ICD-10-CM

## 2021-12-07 PROCEDURE — 76642 ULTRASOUND BREAST LIMITED: CPT

## 2021-12-07 PROCEDURE — G0279 TOMOSYNTHESIS, MAMMO: HCPCS

## 2021-12-12 LAB — CLINICAL REPORT: NORMAL

## 2022-05-12 ENCOUNTER — APPOINTMENT (RX ONLY)
Dept: URBAN - METROPOLITAN AREA CLINIC 154 | Facility: CLINIC | Age: 52
Setting detail: DERMATOLOGY
End: 2022-05-12

## 2022-05-12 DIAGNOSIS — L57.0 ACTINIC KERATOSIS: ICD-10-CM | Status: INADEQUATELY CONTROLLED

## 2022-05-12 DIAGNOSIS — L81.4 OTHER MELANIN HYPERPIGMENTATION: ICD-10-CM | Status: STABLE

## 2022-05-12 DIAGNOSIS — L85.3 XEROSIS CUTIS: ICD-10-CM | Status: STABLE

## 2022-05-12 DIAGNOSIS — Z71.89 OTHER SPECIFIED COUNSELING: ICD-10-CM

## 2022-05-12 DIAGNOSIS — L57.8 OTHER SKIN CHANGES DUE TO CHRONIC EXPOSURE TO NONIONIZING RADIATION: ICD-10-CM | Status: STABLE

## 2022-05-12 DIAGNOSIS — L82.1 OTHER SEBORRHEIC KERATOSIS: ICD-10-CM | Status: STABLE

## 2022-05-12 DIAGNOSIS — E78.2 MIXED HYPERLIPIDEMIA: ICD-10-CM | Status: STABLE

## 2022-05-12 DIAGNOSIS — D22 MELANOCYTIC NEVI: ICD-10-CM | Status: STABLE

## 2022-05-12 DIAGNOSIS — L91.8 OTHER HYPERTROPHIC DISORDERS OF THE SKIN: ICD-10-CM | Status: STABLE

## 2022-05-12 DIAGNOSIS — D18.0 HEMANGIOMA: ICD-10-CM | Status: STABLE

## 2022-05-12 PROBLEM — D18.01 HEMANGIOMA OF SKIN AND SUBCUTANEOUS TISSUE: Status: ACTIVE | Noted: 2022-05-12

## 2022-05-12 PROBLEM — D22.5 MELANOCYTIC NEVI OF TRUNK: Status: ACTIVE | Noted: 2022-05-12

## 2022-05-12 PROBLEM — D23.5 OTHER BENIGN NEOPLASM OF SKIN OF TRUNK: Status: ACTIVE | Noted: 2022-05-12

## 2022-05-12 PROCEDURE — 99213 OFFICE O/P EST LOW 20 MIN: CPT

## 2022-05-12 PROCEDURE — ? DEFER

## 2022-05-12 PROCEDURE — ? FULL BODY SKIN EXAM

## 2022-05-12 PROCEDURE — ? ADDITIONAL NOTES

## 2022-05-12 PROCEDURE — ? TREATMENT REGIMEN

## 2022-05-12 PROCEDURE — ? COUNSELING

## 2022-05-12 ASSESSMENT — LOCATION DETAILED DESCRIPTION DERM
LOCATION DETAILED: LEFT INFERIOR MEDIAL FOREHEAD
LOCATION DETAILED: LEFT VENTRAL DISTAL FOREARM
LOCATION DETAILED: SUPERIOR THORACIC SPINE
LOCATION DETAILED: RIGHT MEDIAL EYEBROW
LOCATION DETAILED: LEFT DISTAL DORSAL FOREARM
LOCATION DETAILED: LEFT ULNAR PALM
LOCATION DETAILED: RIGHT SUPERIOR ANTERIOR NECK
LOCATION DETAILED: LEFT SUPERIOR ANTERIOR NECK
LOCATION DETAILED: LEFT RADIAL DORSAL HAND
LOCATION DETAILED: RIGHT INFERIOR ANTERIOR NECK
LOCATION DETAILED: RIGHT ULNAR PALM
LOCATION DETAILED: LEFT INFERIOR TEMPLE
LOCATION DETAILED: RIGHT DISTAL DORSAL FOREARM
LOCATION DETAILED: LEFT SUPRATARSAL CREASE
LOCATION DETAILED: RIGHT AXILLARY VAULT
LOCATION DETAILED: MID POSTERIOR NECK
LOCATION DETAILED: INFERIOR THORACIC SPINE
LOCATION DETAILED: RIGHT VENTRAL DISTAL FOREARM
LOCATION DETAILED: STERNAL NOTCH
LOCATION DETAILED: RIGHT RADIAL DORSAL HAND
LOCATION DETAILED: LEFT AXILLARY VAULT
LOCATION DETAILED: LEFT SUPERIOR TEMPLE
LOCATION DETAILED: LEFT MEDIAL UPPER BACK
LOCATION DETAILED: PERIUMBILICAL SKIN
LOCATION DETAILED: RIGHT RIB CAGE
LOCATION DETAILED: LEFT MID TEMPLE

## 2022-05-12 ASSESSMENT — LOCATION ZONE DERM
LOCATION ZONE: FACE
LOCATION ZONE: TRUNK
LOCATION ZONE: NECK
LOCATION ZONE: EYELID
LOCATION ZONE: AXILLAE
LOCATION ZONE: HAND
LOCATION ZONE: ARM

## 2022-05-12 ASSESSMENT — LOCATION SIMPLE DESCRIPTION DERM
LOCATION SIMPLE: LEFT ANTERIOR NECK
LOCATION SIMPLE: LEFT FOREARM
LOCATION SIMPLE: LEFT HAND
LOCATION SIMPLE: LEFT TEMPLE
LOCATION SIMPLE: LEFT FOREHEAD
LOCATION SIMPLE: LEFT UPPER BACK
LOCATION SIMPLE: RIGHT FOREARM
LOCATION SIMPLE: UPPER BACK
LOCATION SIMPLE: RIGHT HAND
LOCATION SIMPLE: RIGHT AXILLARY VAULT
LOCATION SIMPLE: RIGHT ANTERIOR NECK
LOCATION SIMPLE: LEFT SUPERIOR EYELID
LOCATION SIMPLE: POSTERIOR NECK
LOCATION SIMPLE: RIGHT EYEBROW
LOCATION SIMPLE: CHEST
LOCATION SIMPLE: ABDOMEN
LOCATION SIMPLE: LEFT AXILLARY VAULT

## 2022-05-12 NOTE — HPI: BODY LOCATION - TRUNK
How Severe Is Your Condition?: mild
Additional History: Patient states that a growth or possible tick appeared under her right breast. Patient did not bother it. She says it eventually went away but left a small jazmine

## 2022-05-12 NOTE — PROCEDURE: DEFER
Introduction Text (Please End With A Colon): The following procedure was deferred:
Instructions (Optional): Area around left eye
Detail Level: Detailed
Procedure To Be Performed At Next Visit: Liquid nitrogen

## 2022-09-07 ENCOUNTER — APPOINTMENT (OUTPATIENT)
Dept: GENERAL RADIOLOGY | Age: 52
End: 2022-09-07
Payer: COMMERCIAL

## 2022-09-07 ENCOUNTER — HOSPITAL ENCOUNTER (EMERGENCY)
Age: 52
Discharge: HOME | End: 2022-09-07
Payer: COMMERCIAL

## 2022-09-07 VITALS
HEIGHT: 69 IN | SYSTOLIC BLOOD PRESSURE: 137 MMHG | HEART RATE: 99 BPM | OXYGEN SATURATION: 97 % | TEMPERATURE: 98.6 F | RESPIRATION RATE: 16 BRPM | DIASTOLIC BLOOD PRESSURE: 83 MMHG | BODY MASS INDEX: 42.45 KG/M2 | WEIGHT: 286.6 LBS

## 2022-09-07 DIAGNOSIS — S69.92XA HAND INJURY, LEFT, INITIAL ENCOUNTER: ICD-10-CM

## 2022-09-07 DIAGNOSIS — S49.92XA LEFT UPPER ARM INJURY, INITIAL ENCOUNTER: Primary | ICD-10-CM

## 2022-09-07 DIAGNOSIS — S59.912A FOREARM INJURY, LEFT, INITIAL ENCOUNTER: ICD-10-CM

## 2022-09-07 PROCEDURE — 73130 X-RAY EXAM OF HAND: CPT

## 2022-09-07 PROCEDURE — 99283 EMERGENCY DEPT VISIT LOW MDM: CPT

## 2022-09-07 PROCEDURE — 6370000000 HC RX 637 (ALT 250 FOR IP): Performed by: PHYSICIAN ASSISTANT

## 2022-09-07 PROCEDURE — 73090 X-RAY EXAM OF FOREARM: CPT

## 2022-09-07 PROCEDURE — 73060 X-RAY EXAM OF HUMERUS: CPT

## 2022-09-07 RX ORDER — ACETAMINOPHEN 325 MG/1
650 TABLET ORAL ONCE
Status: COMPLETED | OUTPATIENT
Start: 2022-09-07 | End: 2022-09-07

## 2022-09-07 RX ADMIN — ACETAMINOPHEN 650 MG: 325 TABLET ORAL at 14:40

## 2022-09-07 ASSESSMENT — ENCOUNTER SYMPTOMS: COLOR CHANGE: 1

## 2022-09-07 ASSESSMENT — PAIN SCALES - GENERAL
PAINLEVEL_OUTOF10: 9
PAINLEVEL_OUTOF10: 9

## 2022-09-07 ASSESSMENT — PAIN DESCRIPTION - LOCATION
LOCATION: ARM
LOCATION: ARM

## 2022-09-07 ASSESSMENT — PAIN - FUNCTIONAL ASSESSMENT
PAIN_FUNCTIONAL_ASSESSMENT: NONE - DENIES PAIN
PAIN_FUNCTIONAL_ASSESSMENT: 0-10

## 2022-09-07 ASSESSMENT — PAIN DESCRIPTION - ORIENTATION: ORIENTATION: LEFT
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
## 2022-09-07 NOTE — DISCHARGE INSTRUCTIONS
Ice your arm for 15 to 20 minutes every couple hours. Elevate your arm for swelling. You can take Tylenol and Aleve for pain. He can alternate between these. Follow-up with primary care doctor or orthopedist in 1 week for reevaluation if persistence or worsening of pain. Return to ED if you begin having uncontrolled pain, paleness/coolness to your hand, loss of sensation or hand or any other concerning symptoms.
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
## 2022-09-20 ENCOUNTER — HOSPITAL ENCOUNTER (OUTPATIENT)
Dept: GENERAL RADIOLOGY | Age: 52
Discharge: HOME | End: 2022-09-20
Payer: COMMERCIAL

## 2022-09-20 ENCOUNTER — HOSPITAL ENCOUNTER (OUTPATIENT)
Age: 52
Discharge: HOME | End: 2022-09-20
Payer: COMMERCIAL

## 2022-09-20 DIAGNOSIS — S69.92XD INJURY OF FINGER OF LEFT HAND, SUBSEQUENT ENCOUNTER: ICD-10-CM

## 2022-09-20 PROCEDURE — 73130 X-RAY EXAM OF HAND: CPT

## 2022-10-13 ENCOUNTER — OFFICE VISIT (OUTPATIENT)
Dept: SURGERY | Age: 52
End: 2022-10-13
Payer: COMMERCIAL

## 2022-10-13 VITALS — WEIGHT: 274 LBS | BODY MASS INDEX: 40.46 KG/M2

## 2022-10-13 DIAGNOSIS — D17.1 LIPOMA OF ABDOMINAL WALL: Primary | ICD-10-CM

## 2022-10-13 PROCEDURE — 99213 OFFICE O/P EST LOW 20 MIN: CPT | Performed by: SURGERY

## 2022-10-13 NOTE — PROGRESS NOTES
Subjective:      Patient ID: Wilton Smart is a 45 y.o. female. HPI  Chief Complaint: lipoma  Patient referred by self for evaluation of lipoma. Patient reports symptoms of growth, discomfort. Location of symptoms is RUQ. Symptoms were first noted a while ago but more noticeable since she has lost weight. Alleviated by rest.  Symptoms aggravated by palpation to area. Previous evaluation includes exam by PCP. Patient has a history of HTN. Will plan following treatment: excision RUQ lipoma. Past Medical History:   Diagnosis Date    Arthritis of knee, right     Benign essential HTN     BRCA1 negative     BRCA2 negative     Breast CA ([facility] 2009    right--breast---radiation and tamoxifen : neg BRCA testing    [facility]     Combined hyperlipidemia     History of therapeutic radiation     Morbid obesity with BMI of 40.0-44.9, adult ([facility]     contrave failed       Past Surgical History:   Procedure Laterality Date    ARM SURGERY Left 11/19/2020    LEFT ULNAR NERVE DECOMPRESSION (AT ELBOW) AND LEFT CARPAL TUNNEL RELEASE performed by Griselda Farber, MD at [facility] Right 07/23/2021    EXCISION RIGHT ARM LIPOMA performed by Trini Bernal MD at [address] [facility] LUMPECTOMY Right 11/2009    HYSTERECTOMY (CERVIX STATUS UNKNOWN)  07/07/2017    SHANNAN AND BSO (CERVIX REMOVED)    JOINT REPLACEMENT Left 03/2019    total knee replacement [place], chronic pain piece missing?     KNEE SURGERY Left     reconstruction and 1 knee acl tear x 6    OVARY REMOVAL      ULNAR TUNNEL RELEASE         Current Outpatient Medications   Medication Sig Dispense Refill    Tirzepatide (MOUNJARO) 5 MG/0.5ML SOPN SC injection Inject 0.5 mLs into the skin once a week 4 Adjustable Dose Pre-filled Pen Syringe 2    Tirzepatide (MOUNJARO) 2.5 MG/0.5ML SOPN SC injection Inject 0.5 mLs into the skin once a week 4 pen 0    Tirzepatide (MOUNJARO) 2.5 MG/0.5ML SOPN SC injection Inject 0.5 mLs into the skin once a week 4 pen 0 Cholecalciferol (VITAMIN D) 50 MCG (2000 UT) CAPS capsule One a day 90 capsule 5    lisinopril-hydroCHLOROthiazide (PRINZIDE;ZESTORETIC) 20-25 MG per tablet TAKE 1 TABLET BY MOUTH ONE TIME A DAY 90 tablet 1    naproxen (NAPROSYN) 250 MG tablet Take 250 mg by mouth daily as needed for Pain       No current facility-administered medications for this visit. Prior to Admission medications    Medication Sig Start Date End Date Taking?  Authorizing Provider   [facility]) 5 MG/0.5ML SOPN SC injection Inject 0.5 mLs into the skin once a week 10/13/22   Tan Nieves MD   [facility]) 2.5 MG/0.5ML SOPN SC injection Inject 0.5 mLs into the skin once a week 8/17/22   [facility]) 2.5 MG/0.5ML SOPN SC injection Inject 0.5 mLs into the skin once a week 8/17/22   Tan Nieves MD   Cholecalciferol (VITAMIN D) 50 MCG (2000 UT) CAPS capsule One a day 8/16/22   Tan Nieves MD   lisinopril-hydroCHLOROthiazide (PRINZIDE;ZESTORETIC) 20-25 MG per tablet TAKE 1 TABLET BY MOUTH ONE TIME A DAY 7/21/22   SUNNY Rios CNP   naproxen (NAPROSYN) 250 MG tablet Take 250 mg by mouth daily as needed for Pain    Historical Provider, MD         No Known Allergies    Social History     Socioeconomic History    Marital status:      Spouse name: Not on file    Number of children: 2    Years of education: Not on file    Highest education level: Not on file   Occupational History    Occupation:    Tobacco Use    Smoking status: Former     Packs/day: 1.50     Years: 30.00     Pack years: 45.00     Types: Cigarettes     Start date: 1/1/1982     Quit date: 3/27/2019     Years since quitting: 3.5    Smokeless tobacco: Never    Tobacco comments:     was up to 2ppd for 4 years slowly down   Vaping Use    Vaping Use: Never used   Substance and Sexual Activity    Alcohol use: Yes     Comment: socially    Drug use: Never    Sexual activity: Not Currently   Other Topics Concern    Not on file   Social History Narrative    Not on file     Social Determinants of Health     Financial Resource Strain: Low Risk     Difficulty of Paying Living Expenses: Not hard at all   Food Insecurity: No Food Insecurity    Worried About Running Out of Food in the Last Year: Never true    Ran Out of Food in the Last Year: Never true   Transportation Needs: Not on file   Physical Activity: Not on file   Stress: Not on file   Social Connections: Not on file   Intimate Partner Violence: Not on file   Housing Stability: Not on file       Family History   Problem Relation Age of Onset    Diabetes Sister     Hypertension Sister     Arthritis Sister     Pacemaker Mother     High Blood Pressure Mother     Hypertension Mother     Elevated Lipids Mother     Arthritis Mother     Heart Disease Father     High Blood Pressure Father     Diabetes Father     Hypertension Father     Elevated Lipids Father     Coronary Art Dis Father     Arthritis Father     Hypertension Brother          Review of Systems   Constitutional: Negative. Skin: Negative. Hematological: Negative. All other systems reviewed and are negative. Objective:   Physical Exam  Vitals reviewed. Constitutional:       General: She is not in acute distress. Appearance: She is well-developed. She is not diaphoretic. HENT:      Head: Normocephalic and atraumatic. Right Ear: External ear normal.      Left Ear: External ear normal.      Nose: Nose normal.   Eyes:      General: No scleral icterus. Conjunctiva/sclera: Conjunctivae normal.   Cardiovascular:      Rate and Rhythm: Normal rate and regular rhythm. Heart sounds: Normal heart sounds. Pulmonary:      Effort: Pulmonary effort is normal. No respiratory distress. Breath sounds: Normal breath sounds. No wheezing. Abdominal:      General: There is no distension. Palpations: Abdomen is soft. There is mass (8 cm round, mobile SQ mass RUQ. firmness noted medially). Tenderness: There is no abdominal tenderness. Musculoskeletal:         General: Normal range of motion. Cervical back: Normal range of motion and neck supple. Skin:     General: Skin is warm and dry. Findings: No erythema. Neurological:      Mental Status: She is alert and oriented to person, place, and time. Psychiatric:         Behavior: Behavior normal.         Thought Content: Thought content normal.         Judgment: Judgment normal.       Assessment:       Diagnosis Orders   1. Lipoma of abdominal wall                Plan:      Plan excision RUQ lipoma  The risks, benefits and alternatives to the planned procedure were discussed. Patient expressed an understanding and is willing to proceed.           Josephine Chavez MD

## 2022-10-19 ENCOUNTER — TELEPHONE (OUTPATIENT)
Dept: ADMINISTRATIVE | Age: 52
End: 2022-10-19

## 2022-10-19 NOTE — TELEPHONE ENCOUNTER
Submitted PA for Almas Garcia 5MG/0.5ML pen-injectors, Key: [id number]. Medication has been DENIED. Denial letter attached. Please notify patient. Thank you.

## 2022-10-19 NOTE — PROGRESS NOTES
[address] [facility] time__0600__________        Surgery time_____0730_______    Take the following medications with a sip of water: Follow your MD/Surgeons pre-procedure instructions regarding your medications     Do not eat or drink anything after 12:00 midnight prior to your surgery. This includes water chewing gum, mints and ice chips. You may brush your teeth and gargle the morning of your surgery, but do not swallow the water     Please see your family doctor/pediatrician for a history and physical and/or concerning medications. Bring any test results/reports from your physicians office. If you are under the care of a heart doctor or specialist doctor, please be aware that you may be asked to them for clearance    You may be asked to stop blood thinners such as Coumadin, Plavix, Fragmin, Lovenox, etc., or any anti-inflammatories such as:  Aspirin, Ibuprofen, Advil, Naproxen prior to your surgery. MAY TAKE TYLENOL   We also ask that you stop any OTC medications such as fish oil, vitamin E, glucosamine, garlic, Multivitamins, COQ 10, etc.    We ask that you do not smoke 24 hours prior to surgery  We ask that you do not  drink any alcoholic beverages 24 hours prior to surgery     You must make arrangements for a responsible adult to take you home after your surgery. For your safety you will not be allowed to leave alone or drive yourself home. Your surgery will be cancelled if you do not have a ride home. Also for your safety, it is strongly suggested that someone stay with you the first 24 hours after your surgery. A parent or legal guardian must accompany a child scheduled for surgery and plan to stay at the hospital until the child is discharged. Please do not bring other children with you. For your comfort, please wear simple loose fitting clothing to the hospital.  Please do not bring valuables.     Do not wear any make-up or nail polish on your fingers or toes      For your safety, please do not wear any jewelry or body piercing's on the day of surgery. All jewelry must be removed. If you have dentures, they will be removed before going to operating room. For your convenience, we will provide you with a container. If you wear contact lenses or glasses, they will be removed, please bring a case for them. If you have a living will and a durable power of  for healthcare, please bring in a copy. As part of our patient safety program to minimize surgical site infections, we ask you to do the following:    Please notify your surgeon if you develop any illness between         now and the  day of your surgery. This includes a cough, cold, fever, sore throat, nausea,         or vomiting, and diarrhea, etc.   Please notify your surgeon if you experience dizziness, shortness         of breath or blurred vision between now and the time of your surgery. Do not shave your operative site 96 hours prior to surgery. For face and neck surgery, men may use an electric razor 48 hours   prior to surgery. You may shower the night before surgery or the morning of   your surgery with an antibacterial soap. You will need to bring a photo ID and insurance card    [facility] has an onsite pharmacy, would you like to utilize our pharmacy     If you will be staying overnight and use a C-pap machine, please bring   your C-pap to hospital     Our goal is to provide you with excellent care, therefore, visitors will be limited to two(2) in the room at a time so that we may focus on providing this care for you. Please contact pre-admission testing if you have any further questions.                  [facility] phone number:  [phone] PAT fax number:  [phone]  Please note these are generalized instructions for all surgical cases, you may be provided with more specific instructions according to your surgery. C-Difficile admission screening and protocol:       * Admitted with diarrhea? [] YES    [x]  NO     *Prior history of C-Diff. In last 3 months? [] YES    [x]  NO     *Antibiotic use in the past 6-8 weeks? []  NO    []  YES                 If yes, which ANTIBIOTIC AND REASON______     *Prior hospitalization or nursing home in the last month? []  YES    [x]  NO        SAFETY FIRST. .call before you fall

## 2022-10-25 NOTE — TELEPHONE ENCOUNTER
Patient was told by [facility] she can continue o use the [facility] discount card

## 2022-10-27 ENCOUNTER — ANESTHESIA EVENT (OUTPATIENT)
Dept: OPERATING ROOM | Age: 52
End: 2022-10-27
Payer: COMMERCIAL

## 2022-10-28 ENCOUNTER — ANESTHESIA (OUTPATIENT)
Dept: OPERATING ROOM | Age: 52
End: 2022-10-28
Payer: COMMERCIAL

## 2022-10-28 ENCOUNTER — HOSPITAL ENCOUNTER (OUTPATIENT)
Age: 52
Setting detail: OUTPATIENT SURGERY
Discharge: HOME | End: 2022-10-28
Attending: SURGERY | Admitting: SURGERY
Payer: COMMERCIAL

## 2022-10-28 VITALS
SYSTOLIC BLOOD PRESSURE: 98 MMHG | TEMPERATURE: 97.2 F | DIASTOLIC BLOOD PRESSURE: 52 MMHG | WEIGHT: 270.2 LBS | HEIGHT: 70 IN | HEART RATE: 70 BPM | OXYGEN SATURATION: 98 % | RESPIRATION RATE: 12 BRPM | BODY MASS INDEX: 38.68 KG/M2

## 2022-10-28 DIAGNOSIS — D17.1 LIPOMA OF ABDOMINAL WALL: ICD-10-CM

## 2022-10-28 PROCEDURE — A4217 STERILE WATER/SALINE, 500 ML: HCPCS | Performed by: SURGERY

## 2022-10-28 PROCEDURE — 3600000002 HC SURGERY LEVEL 2 BASE: Performed by: SURGERY

## 2022-10-28 PROCEDURE — 6360000002 HC RX W HCPCS: Performed by: NURSE ANESTHETIST, CERTIFIED REGISTERED

## 2022-10-28 PROCEDURE — 7100000010 HC PHASE II RECOVERY - FIRST 15 MIN: Performed by: SURGERY

## 2022-10-28 PROCEDURE — 7100000001 HC PACU RECOVERY - ADDTL 15 MIN: Performed by: SURGERY

## 2022-10-28 PROCEDURE — 3700000000 HC ANESTHESIA ATTENDED CARE: Performed by: SURGERY

## 2022-10-28 PROCEDURE — 7100000000 HC PACU RECOVERY - FIRST 15 MIN: Performed by: SURGERY

## 2022-10-28 PROCEDURE — 2709999900 HC NON-CHARGEABLE SUPPLY: Performed by: SURGERY

## 2022-10-28 PROCEDURE — 22903 EXC ABD LES SC 3 CM/>: CPT | Performed by: SURGERY

## 2022-10-28 PROCEDURE — 3600000012 HC SURGERY LEVEL 2 ADDTL 15MIN: Performed by: SURGERY

## 2022-10-28 PROCEDURE — 7100000011 HC PHASE II RECOVERY - ADDTL 15 MIN: Performed by: SURGERY

## 2022-10-28 PROCEDURE — 88304 TISSUE EXAM BY PATHOLOGIST: CPT

## 2022-10-28 PROCEDURE — 3700000001 HC ADD 15 MINUTES (ANESTHESIA): Performed by: SURGERY

## 2022-10-28 PROCEDURE — 2580000003 HC RX 258: Performed by: ANESTHESIOLOGY

## 2022-10-28 PROCEDURE — 2500000003 HC RX 250 WO HCPCS: Performed by: SURGERY

## 2022-10-28 PROCEDURE — 2580000003 HC RX 258: Performed by: SURGERY

## 2022-10-28 PROCEDURE — 2500000003 HC RX 250 WO HCPCS: Performed by: NURSE ANESTHETIST, CERTIFIED REGISTERED

## 2022-10-28 RX ORDER — LIDOCAINE HYDROCHLORIDE 20 MG/ML
INJECTION, SOLUTION EPIDURAL; INFILTRATION; INTRACAUDAL; PERINEURAL PRN
Status: DISCONTINUED | OUTPATIENT
Start: 2022-10-28 | End: 2022-10-28 | Stop reason: SDUPTHER

## 2022-10-28 RX ORDER — SODIUM CHLORIDE 0.9 % (FLUSH) 0.9 %
5-40 SYRINGE (ML) INJECTION EVERY 12 HOURS SCHEDULED
Status: DISCONTINUED | OUTPATIENT
Start: 2022-10-28 | End: 2022-10-28 | Stop reason: HOSPADM

## 2022-10-28 RX ORDER — SODIUM CHLORIDE 0.9 % (FLUSH) 0.9 %
5-40 SYRINGE (ML) INJECTION PRN
Status: CANCELLED | OUTPATIENT
Start: 2022-10-28

## 2022-10-28 RX ORDER — SODIUM CHLORIDE 9 MG/ML
INJECTION, SOLUTION INTRAVENOUS CONTINUOUS
Status: DISCONTINUED | OUTPATIENT
Start: 2022-10-28 | End: 2022-10-28 | Stop reason: HOSPADM

## 2022-10-28 RX ORDER — GLYCOPYRROLATE 0.2 MG/ML
INJECTION INTRAMUSCULAR; INTRAVENOUS PRN
Status: DISCONTINUED | OUTPATIENT
Start: 2022-10-28 | End: 2022-10-28 | Stop reason: SDUPTHER

## 2022-10-28 RX ORDER — BUPIVACAINE HYDROCHLORIDE 5 MG/ML
INJECTION, SOLUTION EPIDURAL; INTRACAUDAL
Status: COMPLETED | OUTPATIENT
Start: 2022-10-28 | End: 2022-10-28

## 2022-10-28 RX ORDER — ONDANSETRON 2 MG/ML
INJECTION INTRAMUSCULAR; INTRAVENOUS PRN
Status: DISCONTINUED | OUTPATIENT
Start: 2022-10-28 | End: 2022-10-28 | Stop reason: SDUPTHER

## 2022-10-28 RX ORDER — FENTANYL CITRATE 50 UG/ML
50 INJECTION, SOLUTION INTRAMUSCULAR; INTRAVENOUS EVERY 5 MIN PRN
Status: CANCELLED | OUTPATIENT
Start: 2022-10-28

## 2022-10-28 RX ORDER — FENTANYL CITRATE 50 UG/ML
25 INJECTION, SOLUTION INTRAMUSCULAR; INTRAVENOUS EVERY 5 MIN PRN
Status: CANCELLED | OUTPATIENT
Start: 2022-10-28

## 2022-10-28 RX ORDER — KETAMINE HCL IN NACL, ISO-OSM 100MG/10ML
SYRINGE (ML) INJECTION PRN
Status: DISCONTINUED | OUTPATIENT
Start: 2022-10-28 | End: 2022-10-28 | Stop reason: SDUPTHER

## 2022-10-28 RX ORDER — SODIUM CHLORIDE 0.9 % (FLUSH) 0.9 %
5-40 SYRINGE (ML) INJECTION EVERY 12 HOURS SCHEDULED
Status: CANCELLED | OUTPATIENT
Start: 2022-10-28

## 2022-10-28 RX ORDER — SODIUM CHLORIDE 9 MG/ML
INJECTION, SOLUTION INTRAVENOUS PRN
Status: DISCONTINUED | OUTPATIENT
Start: 2022-10-28 | End: 2022-10-28 | Stop reason: HOSPADM

## 2022-10-28 RX ORDER — MIDAZOLAM HYDROCHLORIDE 1 MG/ML
INJECTION INTRAMUSCULAR; INTRAVENOUS PRN
Status: DISCONTINUED | OUTPATIENT
Start: 2022-10-28 | End: 2022-10-28 | Stop reason: SDUPTHER

## 2022-10-28 RX ORDER — ONDANSETRON 2 MG/ML
4 INJECTION INTRAMUSCULAR; INTRAVENOUS
Status: CANCELLED | OUTPATIENT
Start: 2022-10-28 | End: 2022-10-29

## 2022-10-28 RX ORDER — SODIUM CHLORIDE 0.9 % (FLUSH) 0.9 %
5-40 SYRINGE (ML) INJECTION PRN
Status: DISCONTINUED | OUTPATIENT
Start: 2022-10-28 | End: 2022-10-28 | Stop reason: HOSPADM

## 2022-10-28 RX ORDER — PROPOFOL 10 MG/ML
INJECTION, EMULSION INTRAVENOUS PRN
Status: DISCONTINUED | OUTPATIENT
Start: 2022-10-28 | End: 2022-10-28 | Stop reason: SDUPTHER

## 2022-10-28 RX ORDER — MAGNESIUM HYDROXIDE 1200 MG/15ML
LIQUID ORAL CONTINUOUS PRN
Status: COMPLETED | OUTPATIENT
Start: 2022-10-28 | End: 2022-10-28

## 2022-10-28 RX ORDER — LIDOCAINE HYDROCHLORIDE 10 MG/ML
INJECTION, SOLUTION EPIDURAL; INFILTRATION; INTRACAUDAL; PERINEURAL
Status: COMPLETED | OUTPATIENT
Start: 2022-10-28 | End: 2022-10-28

## 2022-10-28 RX ORDER — SODIUM CHLORIDE 9 MG/ML
INJECTION, SOLUTION INTRAVENOUS PRN
Status: CANCELLED | OUTPATIENT
Start: 2022-10-28

## 2022-10-28 RX ORDER — PROCHLORPERAZINE EDISYLATE 5 MG/ML
5 INJECTION INTRAMUSCULAR; INTRAVENOUS
Status: CANCELLED | OUTPATIENT
Start: 2022-10-28 | End: 2022-10-29

## 2022-10-28 RX ORDER — CEFAZOLIN SODIUM 1 G/3ML
INJECTION, POWDER, FOR SOLUTION INTRAMUSCULAR; INTRAVENOUS PRN
Status: DISCONTINUED | OUTPATIENT
Start: 2022-10-28 | End: 2022-10-28 | Stop reason: SDUPTHER

## 2022-10-28 RX ADMIN — CEFAZOLIN 2 G: 1 INJECTION, POWDER, FOR SOLUTION INTRAMUSCULAR; INTRAVENOUS at 07:35

## 2022-10-28 RX ADMIN — PROPOFOL 100 MG: 10 INJECTION, EMULSION INTRAVENOUS at 07:33

## 2022-10-28 RX ADMIN — LIDOCAINE HYDROCHLORIDE 100 MG: 20 INJECTION, SOLUTION EPIDURAL; INFILTRATION; INTRACAUDAL; PERINEURAL at 07:33

## 2022-10-28 RX ADMIN — SODIUM CHLORIDE: 9 INJECTION, SOLUTION INTRAVENOUS at 06:27

## 2022-10-28 RX ADMIN — Medication 10 MG: at 07:46

## 2022-10-28 RX ADMIN — PROPOFOL 150 MCG/KG/MIN: 10 INJECTION, EMULSION INTRAVENOUS at 07:34

## 2022-10-28 RX ADMIN — Medication 10 MG: at 07:39

## 2022-10-28 RX ADMIN — GLYCOPYRROLATE 0.2 MG: 0.2 INJECTION, SOLUTION INTRAMUSCULAR; INTRAVENOUS at 07:33

## 2022-10-28 RX ADMIN — MIDAZOLAM 2 MG: 1 INJECTION INTRAMUSCULAR; INTRAVENOUS at 07:28

## 2022-10-28 RX ADMIN — ONDANSETRON 4 MG: 2 INJECTION INTRAMUSCULAR; INTRAVENOUS at 07:37

## 2022-10-28 RX ADMIN — Medication 10 MG: at 07:33

## 2022-10-28 ASSESSMENT — PAIN - FUNCTIONAL ASSESSMENT: PAIN_FUNCTIONAL_ASSESSMENT: 0-10

## 2022-10-28 NOTE — BRIEF OP NOTE
Brief Postoperative Note      Patient: Ofelia Fine  YOB: 1970  MRN: [id number]    Date of Procedure: 10/28/2022    Pre-Op Diagnosis: LIPOMA OF ABDOMINAL WALL    Post-Op Diagnosis: Same       Procedure(s):  EXCISION OF ABDOMINAL WALL LIPOMA    Surgeon(s):  Clint Camejo MD    Assistant:  Surgical Assistant: Ben Lim    Anesthesia: Monitor Anesthesia Care    Estimated Blood Loss (mL): Minimal    Complications: None    Specimens:   ID Type Source Tests Collected by Time Destination   A : A) Abdominal Lipoma  Tissue Tissue SURGICAL PATHOLOGY Clint Camejo MD 10/28/2022 1995        Implants:  * No implants in log *      Drains: * No LDAs found *    Findings: 8 cm lipoma    Electronically signed by Clint Camejo MD on 10/28/2022 at 7:47 AM

## 2022-10-28 NOTE — H&P
Update History & Physical    The patient's History and Physical of October 13, 2022 was reviewed with the patient and I examined the patient. There was no change. Plan excision RUQ lipoma. Area marked. The surgical site was confirmed by the patient and me. Plan: The risks, benefits, expected outcome, and alternative to the recommended procedure have been discussed with the patient. Patient understands and wants to proceed with the procedure.      Electronically signed by Link Kay MD on 10/28/2022 at 7:09 AM

## 2022-10-28 NOTE — PROGRESS NOTES
Pt awake on arrival to phase II. Denies pain at present. VSS. Up to chair. Given juice and cookies and call light. Called for .

## 2022-10-28 NOTE — PROGRESS NOTES
Pt arrived to PACU from OR. Pt awake and alert. Abd soft. Incision REBECCA with surgical glue noted.

## 2022-10-28 NOTE — PROGRESS NOTES
Pt tolerates PO and sitting up in chair. Denies pain at present. VSS. Discharge instructions reviewed with pt and . Both express an understanding of instructions. Pt dressed. Walked to 's car for discharge.

## 2022-10-28 NOTE — ANESTHESIA PRE PROCEDURE
Department of Anesthesiology  Preprocedure Note       Name:  Amanda Merrill   Age:  45 y.o.  :  1970                                          MRN:  [id number]         Date:  10/28/2022      Surgeon: Tye Muir):  Brennen Horn MD    Procedure: Procedure(s):  EXCISION OF ABDOMINAL WALL LIPOMA    Medications prior to admission:   Prior to Admission medications    Medication Sig Start Date End Date Taking?  Authorizing Provider   Tirzepatide [facility]) 5 MG/0.5ML SOPN SC injection Inject 0.5 mLs into the skin once a week 10/13/22   Gerson Matthew MD   Cholecalciferol (VITAMIN D) 50 MCG (2000) CAPS capsule One a day 22   Gerson Matthew MD   lisinopril-hydroCHLOROthiazide (PRINZIDE;ZESTORETIC) 20-25 MG per tablet TAKE 1 TABLET BY MOUTH ONE TIME A DAY 22   SUNNY Martinez - CNP   naproxen (NAPROSYN) 250 MG tablet Take 250 mg by mouth daily as needed for Pain    Historical Provider, MD       Current medications:    Current Facility-Administered Medications   Medication Dose Route Frequency Provider Last Rate Last Admin    0.9 % sodium chloride infusion   IntraVENous Continuous Kaykay Snyder  mL/hr at 10/28/22 0627 New Bag at 10/28/22 0627    sodium chloride flush 0.9 % injection 5-40 mL  5-40 mL IntraVENous 2 times per day Kaykay Snyder MD        sodium chloride flush 0.9 % injection 5-40 mL  5-40 mL IntraVENous PRN Kaykay Snyder MD        0.9 % sodium chloride infusion   IntraVENous PRN Kaykay Snyder MD           Allergies:  No Known Allergies    Problem List:    Patient Active Problem List   Diagnosis Code    Benign essential HTN I10    Combined hyperlipidemia E78.2    Arthritis of knee, right M17.11    Former smoker Z87.891    Morbid obesity with BMI of 40.0-44.9, adult ([facility] [address] E66.01, Z68.41    COVID-19 virus infection U07.1    Lipoma of right upper extremity D17.21    Lipoma of abdominal wall D17.1       Past Medical History:        Diagnosis Date    Arthritis of knee, right     Benign essential HTN     BRCA1 negative     BRCA2 negative     Breast CA ([facility] [address] 2009    right--breast---radiation and tamoxifen : neg BRCA testing    Breast cancer ([facility] [address]     Combined hyperlipidemia     NO MEDS    History of therapeutic radiation     Morbid obesity with BMI of 40.0-44.9, adult ([facility] [address]     contrave failed    Prolonged emergence from general anesthesia     PATIENT AND FAMILY HX       Past Surgical History:        Procedure Laterality Date    ANTERIOR CRUCIATE LIGAMENT REPAIR Left     X2    ARM SURGERY Left 11/19/2020    LEFT ULNAR NERVE DECOMPRESSION (AT ELBOW) AND LEFT CARPAL TUNNEL RELEASE performed by Alana Ortiz MD at [address] [place] [address] Av. Right 07/23/2021    EXCISION RIGHT ARM LIPOMA performed by Naeem Palumbo MD at [place] [address] LUMPECTOMY Right 11/2009    CARPAL TUNNEL RELEASE Right     HYSTERECTOMY (CERVIX STATUS UNKNOWN)  07/07/2017    SHANNAN AND BSO (CERVIX REMOVED)    JOINT REPLACEMENT Left 03/2019    total knee replacement Lova Aiden, chronic pain piece missing?     KNEE ARTHROSCOPY Left     MULTIPLE    KNEE SURGERY Left     reconstruction and 1 knee acl tear x 6    OVARY REMOVAL         Social History:    Social History     Tobacco Use    Smoking status: Former     Packs/day: 1.50     Years: 30.00     Pack years: 45.00     Types: Cigarettes     Start date: 1/1/1982     Quit date: 3/27/2019     Years since quitting: 3.5    Smokeless tobacco: Never    Tobacco comments:     was up to 2ppd for 4 years slowly down   Substance Use Topics    Alcohol use: Yes     Comment: socially                                Counseling given: Not Answered  Tobacco comments: was up to 2ppd for 4 years slowly down      Vital Signs (Current):   Vitals:    10/19/22 1756 10/28/22 0611 10/28/22 0620   BP:   125/72   Pulse:   82   Resp:   18   Temp:   97.7 °F (36.5 °C)   TempSrc:   Temporal   SpO2:   96%   Weight: 274 lb (124.3 kg) 270 lb 3.2 oz (122.6 kg)    Height: 5' 9.5\" (1.765 m)                                                BP Readings from Last 3 Encounters:   10/28/22 125/72   09/20/22 116/72   09/07/22 137/83       NPO Status: Time of last liquid consumption: 2300                        Time of last solid consumption: 2300                        Date of last liquid consumption: 10/27/22                        Date of last solid food consumption: 10/27/22    BMI:   Wt Readings from Last 3 Encounters:   10/28/22 270 lb 3.2 oz (122.6 kg)   10/13/22 274 lb (124.3 kg)   09/20/22 281 lb (127.5 kg)     Body mass index is 39.33 kg/m². CBC:   Lab Results   Component Value Date/Time    WBC 7.7 08/15/2022 09:06 AM    RBC 4.97 08/15/2022 09:06 AM    HGB 14.3 08/15/2022 09:06 AM    HCT 42.9 08/15/2022 09:06 AM    MCV 86.4 08/15/2022 09:06 AM    RDW 15.2 08/15/2022 09:06 AM     08/15/2022 09:06 AM       CMP:   Lab Results   Component Value Date/Time     08/15/2022 09:06 AM    K 4.7 08/15/2022 09:06 AM     08/15/2022 09:06 AM    CO2 26 08/15/2022 09:06 AM    BUN 13 08/15/2022 09:06 AM    CREATININE 0.7 08/15/2022 09:06 AM    GFRAA >60 08/15/2022 09:06 AM    GFRAA >60 11/12/2010 07:25 PM    AGRATIO 1.6 08/15/2022 09:06 AM    LABGLOM >60 08/15/2022 09:06 AM    GLUCOSE 115 08/15/2022 09:06 AM    PROT 6.9 08/15/2022 09:06 AM    PROT 6.9 09/08/2010 09:14 PM    CALCIUM 9.3 08/15/2022 09:06 AM    BILITOT 0.7 08/15/2022 09:06 AM    ALKPHOS 106 08/15/2022 09:06 AM    AST 18 08/15/2022 09:06 AM    ALT 22 08/15/2022 09:06 AM       POC Tests: No results for input(s): POCGLU, POCNA, POCK, POCCL, POCBUN, POCHEMO, POCHCT in the last 72 hours.     Coags:   Lab Results   Component Value Date/Time    PROTIME 12.7 03/14/2019 11:25 AM    INR 0.9 03/14/2019 11:25 AM       HCG (If Applicable): No results found for: PREGTESTUR, PREGSERUM, HCG, HCGQUANT     ABGs: No results found for: PHART, PO2ART, EPB0FJE, ZKB3ITM, BEART, D7ACQJPT     Type & Screen (If Applicable):  Lab Results   Component Value Date    LABABO B  POSITIVE   03/14/2019       Drug/Infectious Status (If Applicable):  No results found for: HIV, HEPCAB    COVID-19 Screening (If Applicable):   Lab Results   Component Value Date/Time    COVID19 Not Detected 11/13/2020 01:13 PM           Anesthesia Evaluation   no history of anesthetic complications:   Airway: Mallampati: II  TM distance: <3 FB   Neck ROM: full  Comment: Prior airway:  Scope used: Li; Blade size: 2; Tube type: ETT - Standard; Tube size: 7 mm; Technique: DL, Stylet; Technique comment: Dentition intact, Atraumatic; Quality of view: 1; Ease: 1; Cuff to seal: Yes; Secured at: 21 cm; Humidvent: Yes; Verified by: Auscultation, Capnometry; Attempts: 1; Removal condition: Suctioned, Breathing Well, Follows Commands, Sustained Headlift; Comment:easy bmv    Large mouth  Mouth opening: > = 3 FB   Dental:    (+) bridge      Pulmonary:       (-) sleep apnea (denies), rhonchi, wheezes and rales  Smoker: former, 2ppd in past.                          ROS comment: Denies home inhalers   Cardiovascular:  Exercise tolerance: good (>4 METS),   (+) hypertension:, hyperlipidemia    (-) orthopnea,  RIBERA, murmur, weak pulses and peripheral edema    ECG reviewed  Rhythm: regular  Rate: normal                 ROS comment: EKG:  Normal sinus rhythm   Leftward axis   Otherwise normal ECG   No previous ECGs available     Neuro/Psych:   (+) depression/anxiety    (-) seizures, TIA and CVA           GI/Hepatic/Renal:   (+) morbid obesity     (-) liver disease and no renal disease       Endo/Other:    (+) : arthritis:., malignancy/cancer (breast, right; s/p lumpectomy + radiation).     Diabetes: HgbA1c: 6.1%                ROS comment: 8 cm round, mobile subcutaneous mass in RUQ with   firmness noted medially, more noticeable with weight loss.    h/o RUE lipoma s/p excision by Dr. Perez Rodríguez; mass not suspected to be related to history of right breast CA Abdominal:   (+) obese (Protuberant, morbidly obese),           Vascular: Other Findings: RUQ abdominal lipoma described by patient not appreciated on visual examination            Anesthesia Plan      MAC     ASA 3     (NPO appropriate. Ms. Romero Murillo denies active nausea / reflux.)  Induction: intravenous. MIPS: Postoperative opioids intended and Prophylactic antiemetics administered. Anesthetic plan and risks discussed with patient (spouse at bedside). Use of blood products discussed with patient whom consented to blood products. Plan discussed with CRNA. This pre-anesthesia assessment may be used as a history and physical.    DOS STAFF ADDENDUM:    Pt seen and examined, chart reviewed (including anesthesia, drug and allergy history). No interval changes to history and physical examination. Anesthetic plan, risks, benefits, alternatives, and personnel involved discussed with patient. Patient verbalized an understanding and agrees to proceed.       Kandy Alexander MD  October 28, 2022  6:38 AM

## 2022-10-28 NOTE — OP NOTE
[address]           [address] [facility]                                OPERATIVE REPORT    PATIENT NAME: Fazal Soler                     :        1970  MED REC NO:   [id number]                          ROOM:  ACCOUNT NO:   [de-identified]                           ADMIT DATE: 10/28/2022  PROVIDER:     Jesica Vera MD    DATE OF PROCEDURE:  10/28/2022    PREOPERATIVE DIAGNOSIS:  Right upper quadrant abdominal wall lipoma. POSTOPERATIVE DIAGNOSIS:  Right upper quadrant abdominal wall lipoma. OPERATION PERFORMED:  Excision of abdominal wall lipoma. SURGEON:  Jesica Vera MD    ANESTHESIA:  MAC with local anesthetic. ASA CLASS:  III. ANTIBIOTICS:  Ancef 2 gm IV. DVT PROPHYLAXIS:  Bilateral pneumatic compression devices. ESTIMATED BLOOD LOSS:  Minimal.    SPECIMEN:  Abdominal wall lipoma. INDICATIONS:  The patient is a 49-year-old female with an enlarging  increasingly painful mass of the right upper quadrant. She has lost  some weight recently and it has become more noticeable and causing  discomfort. As a result, I recommended excision of this in the  operating room. Risks of bleeding, infection, anesthesia, risks of  injuring surrounding structures as well as recurrence were discussed at  length and she was agreeable to proceed. OPERATIVE PROCEDURE:  The patient was brought to the operating suite and  placed in the supine position on the operating room table. Anesthesia  was induced that she tolerated well. The abdomen was prepped and draped  in the usual sterile fashion. A time-out was performed. After  injecting local anesthetic, a 7 cm incision was made over the top of  about 8 cm palpable mass in the right upper quadrant. We carried this  down into the subcutaneous tissue where a well-circumscribed lipoma was  encountered. It was firm medially, but otherwise appeared like a normal  lipoma.   It was circumferentially dissected with a combination of blunt  dissection and cautery and then removed in its entirety. Remainder of  soft tissue was palpable and felt normal.  Hemostasis was reassured. The wound was then closed with layered Vicryl and then Dermabond  applied. The patient tolerated the procedure well. She was taken to  PACU in stable condition. All counts were correct at the end of the  case.         Brittney Cain MD    D: 10/28/2022 8:12:46       T: 10/28/2022 15:53:20     [email or web address]  Job#: [id number]     Doc#: [id number]    CC:  Divine Cohen MD

## 2022-10-28 NOTE — ANESTHESIA POSTPROCEDURE EVALUATION
Department of Anesthesiology  Postprocedure Note    Patient: Andrew Cabezas  MRN: [id number]  YOB: 1970  Date of evaluation: 10/28/2022      Procedure Summary     Date: 10/28/22 Room / Location: [address]    Anesthesia Start: 2221 Anesthesia Stop: 0800    Procedure: EXCISION OF ABDOMINAL WALL LIPOMA (Abdomen) Diagnosis:       Lipoma of abdominal wall      (LIPOMA OF ABDOMINAL WALL)    Surgeons: Janina Treadwell MD Responsible Provider: Kwabena Ricardo MD    Anesthesia Type: MAC ASA Status: 3          Anesthesia Type:  MAC    Nelson Phase I: Nelson Score: 10    Nelson Phase II: Nelson Score: 10      Anesthesia Post Evaluation    Patient location during evaluation: PACU  Patient participation: complete - patient participated  Level of consciousness: awake  Airway patency: patent  Nausea & Vomiting: no nausea and no vomiting  Complications: no  Cardiovascular status: hemodynamically stable and blood pressure returned to baseline  Respiratory status: spontaneous ventilation, nonlabored ventilation and room air  Hydration status: stable  Comments: Ms. Kenisha Polanco was seen resting comfortably following procedure. Appropriate for discharge home with .

## 2022-11-10 NOTE — PROGRESS NOTES
[facility] ENDOSCOPY COLONOSCOPY PRE-OPERATIVE INSTRUCTIONS    Procedure date__11/17/22_______Arrival time__0700__________        Surgery time__0800__________       Clear liquids the day before the procedure. Do not eat or drink anything within 5 hours of your procedure. This includes water chewing gum, mints and ice chips. You may brush your teeth and gargle the morning of your surgery, but do not swallow the water    You may be asked to stop blood thinners such as Coumadin, Plavix, Fragmin, Lovenox, etc., or any anti-inflammatories such as:  Aspirin, Ibuprofen, Advil, Naproxen prior to your procedure. We also ask that you stop any OTC medications such as fish oil, vitamin E, glucosamine, garlic, Multivitamins, COQ 10, etc.    You must make arrangements for a responsible adult to arrive with you and stay in our waiting area during your procedure. They will also need to take you home after your procedure. For your safety you will not be allowed to leave alone or drive yourself home. Also for your safety, it is strongly suggested that someone stay with you the first 24 hours after your procedure. For your comfort, please wear simple loose fitting clothing to the center. Please do not bring valuables. If you have a living will and a durable power of  for healthcare, please bring in a copy.      You will need to bring a photo ID and insurance card    Our goal is to provide you with excellent care so if you have any questions, please contact us at the [facility] at [phone]         Please note these are generalized instructions for all colonoscopy cases, you may be provided with more specific instructions if necessary

## 2022-11-15 ENCOUNTER — TELEPHONE (OUTPATIENT)
Dept: ADMINISTRATIVE | Age: 52
End: 2022-11-15

## 2022-11-16 ENCOUNTER — ANESTHESIA EVENT (OUTPATIENT)
Dept: ENDOSCOPY | Age: 52
End: 2022-11-16
Payer: COMMERCIAL

## 2022-11-17 ENCOUNTER — ANESTHESIA (OUTPATIENT)
Dept: ENDOSCOPY | Age: 52
End: 2022-11-17
Payer: COMMERCIAL

## 2022-11-17 ENCOUNTER — HOSPITAL ENCOUNTER (OUTPATIENT)
Age: 52
Setting detail: OUTPATIENT SURGERY
Discharge: HOME | End: 2022-11-17
Attending: INTERNAL MEDICINE | Admitting: INTERNAL MEDICINE
Payer: COMMERCIAL

## 2022-11-17 VITALS
DIASTOLIC BLOOD PRESSURE: 75 MMHG | HEIGHT: 70 IN | BODY MASS INDEX: 37.59 KG/M2 | WEIGHT: 262.6 LBS | SYSTOLIC BLOOD PRESSURE: 102 MMHG | HEART RATE: 81 BPM | RESPIRATION RATE: 16 BRPM | TEMPERATURE: 97.6 F | OXYGEN SATURATION: 100 %

## 2022-11-17 DIAGNOSIS — Z12.11 SCREEN FOR COLON CANCER: ICD-10-CM

## 2022-11-17 PROCEDURE — 2580000003 HC RX 258: Performed by: STUDENT IN AN ORGANIZED HEALTH CARE EDUCATION/TRAINING PROGRAM

## 2022-11-17 PROCEDURE — 7100000011 HC PHASE II RECOVERY - ADDTL 15 MIN: Performed by: INTERNAL MEDICINE

## 2022-11-17 PROCEDURE — 88305 TISSUE EXAM BY PATHOLOGIST: CPT

## 2022-11-17 PROCEDURE — 3700000000 HC ANESTHESIA ATTENDED CARE: Performed by: INTERNAL MEDICINE

## 2022-11-17 PROCEDURE — 3700000001 HC ADD 15 MINUTES (ANESTHESIA): Performed by: INTERNAL MEDICINE

## 2022-11-17 PROCEDURE — 6360000002 HC RX W HCPCS: Performed by: NURSE ANESTHETIST, CERTIFIED REGISTERED

## 2022-11-17 PROCEDURE — 2709999900 HC NON-CHARGEABLE SUPPLY: Performed by: INTERNAL MEDICINE

## 2022-11-17 PROCEDURE — 2500000003 HC RX 250 WO HCPCS: Performed by: NURSE ANESTHETIST, CERTIFIED REGISTERED

## 2022-11-17 PROCEDURE — 7100000010 HC PHASE II RECOVERY - FIRST 15 MIN: Performed by: INTERNAL MEDICINE

## 2022-11-17 PROCEDURE — 3609010600 HC COLONOSCOPY POLYPECTOMY SNARE/COLD BIOPSY: Performed by: INTERNAL MEDICINE

## 2022-11-17 RX ORDER — LIDOCAINE HYDROCHLORIDE 20 MG/ML
INJECTION, SOLUTION EPIDURAL; INFILTRATION; INTRACAUDAL; PERINEURAL PRN
Status: DISCONTINUED | OUTPATIENT
Start: 2022-11-17 | End: 2022-11-17 | Stop reason: SDUPTHER

## 2022-11-17 RX ORDER — SODIUM CHLORIDE 0.9 % (FLUSH) 0.9 %
5-40 SYRINGE (ML) INJECTION PRN
Status: DISCONTINUED | OUTPATIENT
Start: 2022-11-17 | End: 2022-11-17 | Stop reason: HOSPADM

## 2022-11-17 RX ORDER — SODIUM CHLORIDE 9 MG/ML
INJECTION, SOLUTION INTRAVENOUS PRN
Status: DISCONTINUED | OUTPATIENT
Start: 2022-11-17 | End: 2022-11-17 | Stop reason: HOSPADM

## 2022-11-17 RX ORDER — PROPOFOL 10 MG/ML
INJECTION, EMULSION INTRAVENOUS PRN
Status: DISCONTINUED | OUTPATIENT
Start: 2022-11-17 | End: 2022-11-17 | Stop reason: SDUPTHER

## 2022-11-17 RX ORDER — SODIUM CHLORIDE 9 MG/ML
INJECTION, SOLUTION INTRAVENOUS CONTINUOUS
Status: DISCONTINUED | OUTPATIENT
Start: 2022-11-17 | End: 2022-11-17 | Stop reason: HOSPADM

## 2022-11-17 RX ORDER — SODIUM CHLORIDE 0.9 % (FLUSH) 0.9 %
5-40 SYRINGE (ML) INJECTION EVERY 12 HOURS SCHEDULED
Status: DISCONTINUED | OUTPATIENT
Start: 2022-11-17 | End: 2022-11-17 | Stop reason: HOSPADM

## 2022-11-17 RX ADMIN — LIDOCAINE HYDROCHLORIDE 50 MG: 20 INJECTION, SOLUTION EPIDURAL; INFILTRATION; INTRACAUDAL; PERINEURAL at 08:00

## 2022-11-17 RX ADMIN — PROPOFOL 50 MG: 10 INJECTION, EMULSION INTRAVENOUS at 08:11

## 2022-11-17 RX ADMIN — PROPOFOL 50 MG: 10 INJECTION, EMULSION INTRAVENOUS at 08:03

## 2022-11-17 RX ADMIN — PROPOFOL 50 MG: 10 INJECTION, EMULSION INTRAVENOUS at 08:08

## 2022-11-17 RX ADMIN — PROPOFOL 150 MG: 10 INJECTION, EMULSION INTRAVENOUS at 08:00

## 2022-11-17 RX ADMIN — PROPOFOL 30 MG: 10 INJECTION, EMULSION INTRAVENOUS at 08:13

## 2022-11-17 RX ADMIN — PROPOFOL 50 MG: 10 INJECTION, EMULSION INTRAVENOUS at 08:05

## 2022-11-17 RX ADMIN — SODIUM CHLORIDE: 9 INJECTION, SOLUTION INTRAVENOUS at 07:27

## 2022-11-17 RX ADMIN — PROPOFOL 50 MG: 10 INJECTION, EMULSION INTRAVENOUS at 08:02

## 2022-11-17 ASSESSMENT — PAIN - FUNCTIONAL ASSESSMENT: PAIN_FUNCTIONAL_ASSESSMENT: NONE - DENIES PAIN

## 2022-11-17 NOTE — ANESTHESIA POSTPROCEDURE EVALUATION
Department of Anesthesiology  Postprocedure Note    Patient: Ofelia Fine  MRN: [id number]  YOB: 1970  Date of evaluation: 11/17/2022      Procedure Summary     Date: 11/17/22 Room / Location: [place] [address] 01 / [address] [facility]    Anesthesia Start: 0838 Anesthesia Stop: 7016    Procedure: COLONOSCOPY POLYPECTOMY SNARE/COLD BIOPSY Diagnosis:       Screen for colon cancer      (Screen for colon cancer)    Surgeons: Jerald Shah MD Responsible Provider: Marcial Mast MD    Anesthesia Type: MAC ASA Status: 3          Anesthesia Type: No value filed. Nelson Phase I: Nelson Score: 10    Nelson Phase II: Nelson Score: 10      Anesthesia Post Evaluation    Patient location during evaluation: PACU  Patient participation: complete - patient participated  Level of consciousness: awake  Airway patency: patent  Nausea & Vomiting: no nausea and no vomiting  Cardiovascular status: blood pressure returned to baseline  Respiratory status: acceptable  Hydration status: stable  Comments: Vital signs stable  OK to discharge from Stage I post anesthesia care.   Care transferred from Anesthesiology department on discharge from perioperative area   Multimodal analgesia pain management approach

## 2022-11-17 NOTE — OP NOTE
Colonoscopy Procedure Note      Patient: Marjan Wilson  : 1970  Acct#:     Procedure: Colonoscopy with polypectomy (cold snare)    Date:  2022    Surgeon:  Javier Mendez MD    Referring Physician:  Cristi Torres MD    Previous Colonoscopy: NO  Date: N/A  Greater than 3 years: N/A    Preoperative Diagnosis:  1. Screening     Postoperative Diagnosis:  1. Ascending Colon Polyp    Consent:  The patient or their legal guardian has signed a consent, and is aware of the potential risks, benefits, alternatives, and potential complications of this procedure. These include, but are not limited to hemorrhage, bleeding, post procedural pain, perforation, phlebitis, aspiration, hypotension, hypoxia, cardiovascular events such as arryhthmia, and possibly death. Additionally, the possibility of missed colonic polyps and interval colon cancer was discussed in the consent. Anesthesia:  The patient was administered IV propofol per anesthesiology team.  Please see their operative records for full details. Procedure: An informed consent was obtained from the patient after explanation of indications, benefits, possible risks and complications of the procedure. The patient was then taken to the endoscopy suite, placed in the left lateral decubitus position, and the above IV anesthesia was administered. A digital rectal examination was performed and revealed negative without mass, lesions or tenderness. The Olympus video colonoscope was placed in the patient's rectum under digital direction and advanced to the cecum. The cecum was identified by characteristic anatomy and ballottment. The preparation was excellent. The ileocecal valve was identified. The scope was then withdrawn back through the cecum, ascending, transverse, descending, sigmoid colon, and rectum.   Careful circumferential examination of the mucosa in these areas demonstrated:    A 3 mm polyp in the ascending colon removed completely with cold snare polypectomy     The scope was then withdrawn into the rectum and retroflexed. The retroflexed view of the anal verge and rectum demonstrates normal rectum. The scope was straightened, the colon was decompressed and the scope was withdrawn from the patient. The patient tolerated the procedure well and was taken to the PACU in good condition. Estimated Blood Loss (mL): < 5 CC     Complications: None    Specimens:   ID Type Source Tests Collected by Time Destination   A : A. Ascending polyp cold snare Tissue Colon SURGICAL PATHOLOGY Champ Hernandez MD 11/17/2022 5172      Impression:  See post-procedure diagnoses. Recommendations:    Await pathology results. Recommend repeat colonoscopy in 5-10 years pending pathology results. The patient had biopsies taken today. The patient should call for results in 7 days if they have not heard from our office. Our number is [phone].      [address]  11/17/2022  [phone]

## 2022-11-17 NOTE — DISCHARGE INSTRUCTIONS
Recommendations:    Await pathology results. Recommend repeat colonoscopy in 5-10 years pending pathology results. The patient had biopsies taken today. The patient should call for results in 7 days if they have not heard from our office. Our number is [phone]. Discharge Instructions for Colonoscopy     Colonoscopy is a visual exam of the lining of the large intestine, also called the bowel or colon, with a colonoscope. A colonoscope is a flexible tube with a light and a viewing device. It allows the doctor to view the inside of the colon through a tiny video camera. Colonoscopy is performed for many reasons: unexplained anemia , pain, diarrhea , bloody stools, cancer screening, among many other reasons. Complications from a colonoscopy are rare. Some possible serious complications include perforated bowel (which might require surgery) and bleeding (which could require blood transfusion ). Minor complications include bloating, gas, and cramping that can last for 1-2 days after the procedure. Because air is put into your colon during the procedure, it is normal to pass large amounts of air from your rectum. You may not have a bowel movement for 1-3 days after the procedure. What You Will Need:  Someone to drive you home after the procedure     Steps to Take:  [address] when you get home. Because the sedative will make you drowsy, don't drive, operate machinery, or make important decisions the day of the procedure. Feelings of bloating, gas, or cramping may persist for 24 hours. Diet -  Try sips of water first. If tolerated, resume bland food (scrambled eggs, toast, soup) first.  If tolerated, resume regular diet or the diet recommended by your physician. Do not drink alcohol for 24 hours. Physical Activity -  Ask your doctor when you will be able to return to work.    Do not drive, operate heavy machinery, or do activities that require coordination or balance for 24 hours. Otherwise, return to your normal routine as soon as you are comfortable to do so, which is usually the next day after the procedure. Medications - When taking medications, it's important to: Take your medication as directed, not more, not less, not at a different time. Do not stop taking them without consulting your healthcare provider. Don't share them with anyone else. Know what effects and side effects to expect, and report them to your healthcare provider. If you are taking more than one drug, even if it is an over-the-counter medication, herb, or dietary supplement, be sure to check with a physician or pharmacist about drug interactions. Plan ahead for refills so you don't run out. Lifestyle Changes - The results of your colonoscopy will determine if any lifestyle changes are necessary. Follow-up:  The doctor will usually give you a preliminary report after the medication wears off and you are more alert. The results from a biopsy can take as long as 1-2 weeks to be completed. Schedule a follow-up appointment as directed by your doctor. You should schedule a follow-up colonoscopy as recommended by your doctor. Call Your Doctor If Any of the Following Occurs:  Bleeding from your rectum; notify your doctor if you pass a teaspoonful or more of blood   Black, tarry stools   Severe abdominal pain   Hard, swollen abdomen   Signs of infection, including fever or chills   Inability to pass gas or stool   Coughing, shortness of breath, chest pain, severe nausea or vomiting     In case of an emergency, call 911 immediately.

## 2022-11-17 NOTE — ANESTHESIA PRE PROCEDURE
Department of Anesthesiology  Preprocedure Note       Name:  Jesus Vera   Age:  45 y.o.  :  1970                                          MRN:  [id number]         Date:  2022      Surgeon: Cherise Owen):  Poonam Duvall MD    Procedure: Procedure(s):  COLORECTAL CANCER SCREENING, NOT HIGH RISK    Medications prior to admission:   Prior to Admission medications    Medication Sig Start Date End Date Taking?  Authorizing Provider   lisinopril-hydroCHLOROthiazide (PRINZIDE;ZESTORETIC) 10-12.5 MG per tablet Take 1 tablet by mouth daily 22   Jessy Faith MD   Tirzepatide [facility]) 5 MG/0.5ML SOPN SC injection Inject 0.5 mLs into the skin once a week 10/13/22   Jessy Faiht MD   Cholecalciferol (VITAMIN D) 50 MCG (2000) CAPS capsule One a day 22   Jessy Faith MD   lisinopril-hydroCHLOROthiazide (PRINZIDE;ZESTORETIC) 20-25 MG per tablet TAKE 1 TABLET BY MOUTH ONE TIME A DAY 22   Sim Seek, APRN - CNP   naproxen (NAPROSYN) 250 MG tablet Take 250 mg by mouth daily as needed for Pain    Historical Provider, MD       Current medications:    Current Facility-Administered Medications   Medication Dose Route Frequency Provider Last Rate Last Admin    0.9 % sodium chloride infusion   IntraVENous Continuous Dionte Saucedo  mL/hr at 22 0727 New Bag at 22 0727    sodium chloride flush 0.9 % injection 5-40 mL  5-40 mL IntraVENous 2 times per day Dionte Saucedo MD        sodium chloride flush 0.9 % injection 5-40 mL  5-40 mL IntraVENous PRN Dionte Saucedo MD        0.9 % sodium chloride infusion   IntraVENous PRN Dionte Saucedo MD           Allergies:  No Known Allergies    Problem List:    Patient Active Problem List   Diagnosis Code    Benign essential HTN I10    Combined hyperlipidemia E78.2    Arthritis of knee, right M17.11    Former smoker Z87.891    Morbid obesity with BMI of 40.0-44.9, adult ([facility] E66.01, Z68.41    COVID-19 virus infection U07.1    Lipoma of right upper extremity D17.21    Lipoma of abdominal wall D17.1       Past Medical History:        Diagnosis Date    Arthritis of knee, right     Benign essential HTN     BRCA1 negative     BRCA2 negative     Breast CA ([facility] [facility] 2009    right--breast---radiation and tamoxifen : neg BRCA testing    Breast cancer ([facility]     Combined hyperlipidemia     NO MEDS    History of therapeutic radiation     Morbid obesity with BMI of 40.0-44.9, adult ([facility]     contrave failed    Prolonged emergence from general anesthesia     PATIENT AND FAMILY HX       Past Surgical History:        Procedure Laterality Date    ABDOMEN SURGERY N/A 10/28/2022    EXCISION OF ABDOMINAL WALL LIPOMA performed by Salima Castro MD at [facility] Left     X2    [facility] Left 11/19/2020    LEFT ULNAR NERVE DECOMPRESSION (AT ELBOW) AND LEFT CARPAL TUNNEL RELEASE performed by Jose Martin Baez MD at [address] [place] [address]. Right 07/23/2021    EXCISION RIGHT ARM LIPOMA performed by Salima Castro MD at [place] [address] LUMPECTOMY Right 11/2009    CARPAL TUNNEL RELEASE Right     HYSTERECTOMY (CERVIX STATUS UNKNOWN)  07/07/2017    SHANNAN AND BSO (CERVIX REMOVED)    JOINT REPLACEMENT Left 03/2019    total knee replacement Sergo Dyer, chronic pain piece missing?     KNEE ARTHROSCOPY Left     MULTIPLE    KNEE SURGERY Left     reconstruction and 1 knee acl tear x 6    OVARY REMOVAL         Social History:    Social History     Tobacco Use    Smoking status: Former     Packs/day: 1.50     Years: 30.00     Pack years: 45.00     Types: Cigarettes     Start date: 1/1/1982     Quit date: 3/27/2019     Years since quitting: 3.6    Smokeless tobacco: Never    Tobacco comments:     was up to 2ppd for 4 years slowly down   Substance Use Topics    Alcohol use: Yes     Comment: socially                                Counseling given: Not Answered  Tobacco comments: was up to 2ppd for 4 years slowly down      Vital Signs (Current):   Vitals:    11/10/22 1444 11/17/22 0723   BP:  104/73   Pulse:  88   Resp:  16   Temp:  97.1 °F (36.2 °C)   TempSrc:  Temporal   SpO2:  98%   Weight: 270 lb (122.5 kg) 262 lb 9.6 oz (119.1 kg)   Height: 5' 9.5\" (1.765 m) 5' 9.5\" (1.765 m)                                              BP Readings from Last 3 Encounters:   11/17/22 104/73   11/14/22 114/76   10/28/22 (!) 98/52       NPO Status: Time of last liquid consumption: 0345                        Time of last solid consumption: 1900                        Date of last liquid consumption: 11/17/22                        Date of last solid food consumption: 11/15/22    BMI:   Wt Readings from Last 3 Encounters:   11/17/22 262 lb 9.6 oz (119.1 kg)   11/14/22 266 lb 6.4 oz (120.8 kg)   10/28/22 270 lb 3.2 oz (122.6 kg)     Body mass index is 38.22 kg/m². CBC:   Lab Results   Component Value Date/Time    WBC 7.7 08/15/2022 09:06 AM    RBC 4.97 08/15/2022 09:06 AM    HGB 14.3 08/15/2022 09:06 AM    HCT 42.9 08/15/2022 09:06 AM    MCV 86.4 08/15/2022 09:06 AM    RDW 15.2 08/15/2022 09:06 AM     08/15/2022 09:06 AM       CMP:   Lab Results   Component Value Date/Time     08/15/2022 09:06 AM    K 4.7 08/15/2022 09:06 AM     08/15/2022 09:06 AM    CO2 26 08/15/2022 09:06 AM    BUN 13 08/15/2022 09:06 AM    CREATININE 0.7 08/15/2022 09:06 AM    GFRAA >60 08/15/2022 09:06 AM    GFRAA >60 11/12/2010 07:25 PM    AGRATIO 1.6 08/15/2022 09:06 AM    LABGLOM >60 08/15/2022 09:06 AM    GLUCOSE 115 08/15/2022 09:06 AM    PROT 6.9 08/15/2022 09:06 AM    PROT 6.9 09/08/2010 09:14 PM    CALCIUM 9.3 08/15/2022 09:06 AM    BILITOT 0.7 08/15/2022 09:06 AM    ALKPHOS 106 08/15/2022 09:06 AM    AST 18 08/15/2022 09:06 AM    ALT 22 08/15/2022 09:06 AM       POC Tests: No results for input(s): POCGLU, POCNA, POCK, POCCL, POCBUN, POCHEMO, POCHCT in the last 72 hours.     Coags:   Lab Results   Component Value Date/Time    PROTIME 12.7 03/14/2019 11:25 AM    INR 0.9 03/14/2019 11:25 AM       HCG (If Applicable): No results found for: PREGTESTUR, PREGSERUM, HCG, HCGQUANT     ABGs: No results found for: PHART, PO2ART, ATH3YOB, REE2JDX, BEART, P6GBHJSI     Type & Screen (If Applicable):  Lab Results   Component Value Date    LABABO B  POSITIVE   03/14/2019       Drug/Infectious Status (If Applicable):  No results found for: HIV, HEPCAB    COVID-19 Screening (If Applicable):   Lab Results   Component Value Date/Time    COVID19 Not Detected 11/13/2020 01:13 PM           Anesthesia Evaluation  Patient summary reviewed no history of anesthetic complications:   Airway: Mallampati: II  TM distance: <3 FB   Neck ROM: full  Comment: Prior airway:  Scope used: Li; Blade size: 2; Tube type: ETT - Standard; Tube size: 7 mm; Technique: DL, Stylet; Technique comment: Dentition intact, Atraumatic; Quality of view: 1; Ease: 1; Cuff to seal: Yes; Secured at: 21 cm; Humidvent: Yes; Verified by: Auscultation, Capnometry; Attempts: 1; Removal condition: Suctioned, Breathing Well, Follows Commands, Sustained Headlift; Comment:easy bmv    Large mouth  Mouth opening: > = 3 FB   Dental:    (+) bridge      Pulmonary:       (-) sleep apnea (denies), rhonchi, wheezes and rales  Smoker: former, 2ppd in past.                          ROS comment: Denies home inhalers   Cardiovascular:  Exercise tolerance: good (>4 METS),   (+) hypertension:, hyperlipidemia    (-) past MI, CAD, orthopnea,  RIBERA, murmur, weak pulses and peripheral edema    ECG reviewed  Rhythm: regular  Rate: normal                 ROS comment: EKG:  Normal sinus rhythm   Leftward axis   Otherwise normal ECG   No previous ECGs available     Neuro/Psych:   (+) depression/anxiety    (-) seizures, TIA and CVA           GI/Hepatic/Renal:   (+) morbid obesity     (-) liver disease and no renal disease       Endo/Other:    (+) : arthritis:., malignancy/cancer (breast, right; s/p lumpectomy + radiation).     Diabetes: HgbA1c: 6.1%                ROS comment: 8 cm round, mobile subcutaneous mass in RUQ with   firmness noted medially, more noticeable with weight loss.    h/o RUE lipoma s/p excision by Dr. Gilberto Cates; mass not suspected to be related to history of right breast CA Abdominal:   (+) obese (Protuberant, morbidly obese),           Vascular: Other Findings: RUQ abdominal lipoma described by patient not appreciated on visual examination            Anesthesia Plan      MAC     ASA 3     (50 yo F with PMhx of HTN, HLD, OA, and breast cancer presents for colonoscopy. Discussed risks and benefits to sedation including nausea, vomiting, allergic reaction, headache, delayed cognitive recovery, stroke, heart attack, respiratory depression, and death which patient understood and agreed to proceed. The patient was given the opportunity to ask questions and all questions were answered to the patient's satisfaction.  )  Induction: intravenous. MIPS: Postoperative opioids intended and Prophylactic antiemetics administered. Anesthetic plan and risks discussed with patient (spouse at bedside). Use of blood products discussed with patient whom consented to blood products. Plan discussed with CRNA. This pre-anesthesia assessment may be used as a history and physical.    DOS STAFF ADDENDUM:    Pt seen and examined, chart reviewed (including anesthesia, drug and allergy history). No interval changes to history and physical examination. Anesthetic plan, risks, benefits, alternatives, and personnel involved discussed with patient. Patient verbalized an understanding and agrees to proceed.       Keeley Garland MD  November 17, 2022  7:39 AM

## 2022-11-17 NOTE — H&P
Pre-operative History and Physical    Patient: Kaylie Sanderson  : 1970  Acct#:     Intended Procedure:  Colonoscopy     HISTORY OF PRESENT ILLNESS:  The patient is a 45 y.o. female  who presents for/due to Screening     Past Medical History:        Diagnosis Date    Arthritis of knee, right     Benign essential HTN     BRCA1 negative     BRCA2 negative     [facility]     right--breast---radiation and tamoxifen : neg BRCA testing    Breast cancer ([facility]     Combined hyperlipidemia     NO MEDS    History of therapeutic radiation     Morbid obesity with BMI of 40.0-44.9, adult ([facility]     contrave failed    Prolonged emergence from general anesthesia     PATIENT AND FAMILY HX     Past Surgical History:        Procedure Laterality Date    ABDOMEN SURGERY N/A 10/28/2022    EXCISION OF ABDOMINAL WALL LIPOMA performed by Mike Lazar MD at [facility] Left     X2    ARM SURGERY Left 2020    LEFT ULNAR NERVE DECOMPRESSION (AT ELBOW) AND LEFT CARPAL TUNNEL RELEASE performed by Lexie Ortega MD at [place] [address] Right 2021    EXCISION RIGHT ARM LIPOMA performed by Mike Lazar MD at [id number] [place] [address] LUMPECTOMY Right 2009    CARPAL TUNNEL RELEASE Right     HYSTERECTOMY (CERVIX STATUS UNKNOWN)  2017    SHANNAN AND BSO (CERVIX REMOVED)    JOINT REPLACEMENT Left 2019    total knee replacement Gladis Calderon, chronic pain piece missing? KNEE ARTHROSCOPY Left     MULTIPLE    KNEE SURGERY Left     reconstruction and 1 knee acl tear x 6    OVARY REMOVAL       Medications Prior to Admission:   Prior to Admission medications    Medication Sig Start Date End Date Taking?  Authorizing Provider   lisinopril-hydroCHLOROthiazide (PRINZIDE;ZESTORETIC) 10-12.5 MG per tablet Take 1 tablet by mouth daily 22   Miguel Aj MD   [facility] 5 MG/0.5ML SOPN SC injection Inject 0.5 mLs into the skin once a week 10/13/22   Miguel Aj MD Cholecalciferol (VITAMIN D) 50 MCG (2000 UT) CAPS capsule One a day 8/16/22   Sohail Urias MD   lisinopril-hydroCHLOROthiazide (PRINZIDE;ZESTORETIC) 20-25 MG per tablet TAKE 1 TABLET BY MOUTH ONE TIME A DAY 7/21/22   Josh Franz SUNNY Sterling - CNP   naproxen (NAPROSYN) 250 MG tablet Take 250 mg by mouth daily as needed for Pain    Historical Provider, MD       Allergies:  Patient has no known allergies. Social History:   TOBACCO:   reports that she quit smoking about 3 years ago. Her smoking use included cigarettes. She started smoking about 40 years ago. She has a 45.00 pack-year smoking history. She has never used smokeless tobacco.  ETOH:   reports current alcohol use. DRUGS:   reports no history of drug use. PHYSICAL EXAM:      Vital Signs: /73   Pulse 88   Temp 97.1 °F (36.2 °C) (Temporal)   Resp 16   Ht 5' 9.5\" (1.765 m)   Wt 262 lb 9.6 oz (119.1 kg)   SpO2 98%   BMI 38.22 kg/m²    Airway: No stridor or wheezing noted. Good air movement  Pulmonary: without wheezes. Clear to auscultation  Cardiac:regular rate and rhythm without loud murmurs  Abdomen:soft, nontender,  Bowel sounds present    Pre-Procedure Assessment / Plan:  1) Colonoscopy     ASA Grade:  ASA 3 - Patient with moderate systemic disease with functional limitations  Mallampati Classification:  Class II    Level of Sedation Plan:Deep sedation    Post Procedure plan: Return to same level of care    I assessed the patient and find that the patient is in satisfactory condition to proceed with the planned procedure and sedation plan. I have explained the risk, benefits, and alternatives to the procedure; the patient understands and agrees to proceed.        Miladys Aguilar MD  11/17/2022

## 2023-01-05 ENCOUNTER — TELEPHONE (OUTPATIENT)
Dept: ADMINISTRATIVE | Age: 53
End: 2023-01-05

## 2023-01-12 NOTE — TELEPHONE ENCOUNTER
The medication DENIED; DENIAL letter uploaded to Marsha Puri on 1/10/2022. If this requires a response please respond to the pool ( [facility] [address]). Thank you please advise patient.

## 2023-03-24 ENCOUNTER — TELEPHONE (OUTPATIENT)
Dept: ORTHOPEDIC SURGERY | Age: 53
End: 2023-03-24
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
## 2023-06-21 ENCOUNTER — HOSPITAL ENCOUNTER (OUTPATIENT)
Dept: ULTRASOUND IMAGING | Age: 53
Discharge: HOME | End: 2023-06-21
Payer: COMMERCIAL

## 2023-06-21 ENCOUNTER — HOSPITAL ENCOUNTER (OUTPATIENT)
Dept: WOMENS IMAGING | Age: 53
Discharge: HOME | End: 2023-06-21
Payer: COMMERCIAL

## 2023-06-21 DIAGNOSIS — R92.8 ABNORMAL MAMMOGRAM: ICD-10-CM

## 2023-06-21 DIAGNOSIS — R92.8 ABNORMAL MAMMOGRAM OF LEFT BREAST: ICD-10-CM

## 2023-06-21 PROCEDURE — G0279 TOMOSYNTHESIS, MAMMO: HCPCS

## 2023-06-21 PROCEDURE — 76642 ULTRASOUND BREAST LIMITED: CPT

## 2023-06-21 NOTE — PROGRESS NOTES
Dr. Marquis Greer spoke to patient regarding recommendation for breast biopsy. RN and patient discussed medical history, allergies, and current medication list. Biopsy procedure explained to patient and printed education and instructions were provided as well. Patient denies any further questions. Requesting an order for biopsy via inbox from referring MD at this time.

## 2023-06-21 NOTE — DISCHARGE INSTRUCTIONS
[address]     [address] [facility] [place] [address] [phone]         ULTRASOUND BIOPSY EDUCATION    NAME:  Stefania Rankin OF BIRTH:  1970   MEDICAL RECORD NUMBER:  [id number]   TODAY'S DATE:  @ED@    What is an Ultrasound Guided Breast Biopsy? Ultrasound guided breast biopsy is a test that uses ultrasound to find an area of your breast where a tissue sample will be taken. The sample is then looked at under a microscope to check for signs of breast cancer. Why is it done? An Ultrasound biopsy is usually done to check for cancer in a lump or cyst found during a mammogram or ultrasound. Preparing for the test?     * Take your medications as prescribed    You may eat and drink fluids before the test    Take a shower the evening or morning before the biopsy. What happens before the test?  Images are taken to find the exact site to be biopsied. Your skin is washed with an alcohol prep. You will be given an injection of medication to numb your breast.   What happens during the test?     Once your breast is numb, a small cut (incision) is made. Using the imaging, the doctor will guide the needle into the biopsy area. A sample of breast tissue is taken through the needle. A small \"Clip\" or Marker is inserted into your breast to seth the biopsy site. The needle is removed and pressure put on the needle site to stop any bleeding. A bandage will be placed over the site. A post mammogram picture will be taken to document the clip placement. How long does the test take? Approximately 60 minutes. Most of the time is spent finding the area for the biopsy. What are the risks? Bleeding: You may have some bleeding which can cause bruising, swelling,    or a bleeding under your skin. Infection:  Signs of infection are redness, swelling, heat, or increasing pain    at the biopsy Site.         Sample size not

## 2023-06-27 ENCOUNTER — HOSPITAL ENCOUNTER (OUTPATIENT)
Dept: WOMENS IMAGING | Age: 53
Discharge: HOME | End: 2023-06-27
Payer: COMMERCIAL

## 2023-06-27 ENCOUNTER — HOSPITAL ENCOUNTER (OUTPATIENT)
Dept: ULTRASOUND IMAGING | Age: 53
Discharge: HOME | End: 2023-06-27
Payer: COMMERCIAL

## 2023-06-27 VITALS — OXYGEN SATURATION: 98 % | DIASTOLIC BLOOD PRESSURE: 85 MMHG | HEART RATE: 70 BPM | SYSTOLIC BLOOD PRESSURE: 132 MMHG

## 2023-06-27 DIAGNOSIS — R92.8 ABNORMAL MAMMOGRAM: ICD-10-CM

## 2023-06-27 PROCEDURE — 88341 IMHCHEM/IMCYTCHM EA ADD ANTB: CPT

## 2023-06-27 PROCEDURE — 88305 TISSUE EXAM BY PATHOLOGIST: CPT

## 2023-06-27 PROCEDURE — 88360 TUMOR IMMUNOHISTOCHEM/MANUAL: CPT

## 2023-06-27 PROCEDURE — 88342 IMHCHEM/IMCYTCHM 1ST ANTB: CPT

## 2023-06-27 PROCEDURE — 19083 BX BREAST 1ST LESION US IMAG: CPT

## 2023-06-27 PROCEDURE — G0279 TOMOSYNTHESIS, MAMMO: HCPCS

## 2023-06-27 ASSESSMENT — PAIN SCALES - GENERAL
PAINLEVEL_OUTOF10: 0
PAINLEVEL_OUTOF10: 0

## 2023-06-28 ENCOUNTER — CASE MANAGEMENT (OUTPATIENT)
Dept: WOMENS IMAGING | Age: 53
End: 2023-06-28

## 2023-06-29 ENCOUNTER — TELEPHONE (OUTPATIENT)
Dept: SURGERY | Age: 53
End: 2023-06-29

## 2023-07-05 ENCOUNTER — OFFICE VISIT (OUTPATIENT)
Dept: BREAST CENTER | Age: 53
End: 2023-07-05
Payer: COMMERCIAL

## 2023-07-05 VITALS
BODY MASS INDEX: 34.5 KG/M2 | DIASTOLIC BLOOD PRESSURE: 84 MMHG | RESPIRATION RATE: 16 BRPM | SYSTOLIC BLOOD PRESSURE: 132 MMHG | HEIGHT: 70 IN | WEIGHT: 241 LBS | OXYGEN SATURATION: 98 %

## 2023-07-05 DIAGNOSIS — Z17.0 MALIGNANT NEOPLASM OF UPPER-INNER QUADRANT OF LEFT BREAST IN FEMALE, ESTROGEN RECEPTOR POSITIVE (HCC): Primary | ICD-10-CM

## 2023-07-05 DIAGNOSIS — C50.212 MALIGNANT NEOPLASM OF UPPER-INNER QUADRANT OF LEFT BREAST IN FEMALE, ESTROGEN RECEPTOR POSITIVE (HCC): Primary | ICD-10-CM

## 2023-07-05 DIAGNOSIS — Z90.11 S/P PARTIAL MASTECTOMY, RIGHT: ICD-10-CM

## 2023-07-05 DIAGNOSIS — Z85.3 HISTORY OF RIGHT BREAST CANCER: ICD-10-CM

## 2023-07-05 PROCEDURE — 99215 OFFICE O/P EST HI 40 MIN: CPT | Performed by: SURGERY

## 2023-07-05 PROCEDURE — 3079F DIAST BP 80-89 MM HG: CPT | Performed by: SURGERY

## 2023-07-05 PROCEDURE — 3075F SYST BP GE 130 - 139MM HG: CPT | Performed by: SURGERY

## 2023-07-05 RX ORDER — LORAZEPAM 0.5 MG/1
0.5 TABLET ORAL EVERY 8 HOURS PRN
Qty: 2 TABLET | Refills: 0 | Status: SHIPPED | OUTPATIENT
Start: 2023-07-05 | End: 2023-07-06

## 2023-07-05 NOTE — PATIENT INSTRUCTIONS
Pathology report reviewed- Stage 1A  Grade 2 ER + MI +    Breast exam performed     Surgery risk vs. Benefits discussed  Surgical options discussed including mastectomy vs lumpectomy  Surgical consent obtained    Plan- Surgery Type L breast Lumpectomy and sentinel lymph node biopsy    Anesthesia MAC     RFID tag ? Yes   Nurse navigators will reach out to set up RFID tag placement 3-7 days before surgery  Hold aspirin, ibuprofen, aleve. If you take blood thinners please hold for 5 days before surgery/tag placement if cleared by your PCP to do so. Refer to [facility] folder for specific pre and post op instructions    Additional testing needed?  Yes MRI for dense breast tissue before full surgical decision is finalized     Follow up in office 10-14 days post op    Return: MRI now, Post op appointment 2 weeks after surgery

## 2023-07-07 ENCOUNTER — TELEPHONE (OUTPATIENT)
Dept: BREAST CENTER | Age: 53
End: 2023-07-07

## 2023-07-07 DIAGNOSIS — C50.219 MALIGNANT NEOPLASM OF UPPER-INNER QUADRANT OF FEMALE BREAST, UNSPECIFIED ESTROGEN RECEPTOR STATUS, UNSPECIFIED LATERALITY (HCC): Primary | ICD-10-CM

## 2023-07-07 RX ORDER — LORAZEPAM 0.5 MG/1
0.5 TABLET ORAL EVERY 8 HOURS PRN
Qty: 2 TABLET | Refills: 0 | Status: SHIPPED | OUTPATIENT
Start: 2023-07-07 | End: 2023-08-06

## 2023-07-07 NOTE — TELEPHONE ENCOUNTER
Patient needs medication called in for MRI- Ativan  Needs to go to:  [address]  [phone]    Patient states script was sent to wrong pharmacy.  (Mail order)

## 2023-07-17 ENCOUNTER — HOSPITAL ENCOUNTER (OUTPATIENT)
Dept: MRI IMAGING | Age: 53
Discharge: HOME | End: 2023-07-17
Payer: COMMERCIAL

## 2023-07-17 DIAGNOSIS — Z85.3 HISTORY OF RIGHT BREAST CANCER: ICD-10-CM

## 2023-07-17 DIAGNOSIS — Z17.0 MALIGNANT NEOPLASM OF UPPER-INNER QUADRANT OF LEFT BREAST IN FEMALE, ESTROGEN RECEPTOR POSITIVE (HCC): ICD-10-CM

## 2023-07-17 DIAGNOSIS — C50.212 MALIGNANT NEOPLASM OF UPPER-INNER QUADRANT OF LEFT BREAST IN FEMALE, ESTROGEN RECEPTOR POSITIVE (HCC): ICD-10-CM

## 2023-07-17 PROCEDURE — 6360000004 HC RX CONTRAST MEDICATION: Performed by: SURGERY

## 2023-07-17 PROCEDURE — C8908 MRI W/O FOL W/CONT, BREAST,: HCPCS

## 2023-07-17 PROCEDURE — A9579 GAD-BASE MR CONTRAST NOS,1ML: HCPCS | Performed by: SURGERY

## 2023-07-17 RX ADMIN — GADOTERIDOL 20 ML: 279.3 INJECTION, SOLUTION INTRAVENOUS at 11:24

## 2023-07-18 DIAGNOSIS — C50.212 MALIGNANT NEOPLASM OF UPPER-INNER QUADRANT OF LEFT BREAST IN FEMALE, ESTROGEN RECEPTOR POSITIVE (HCC): Primary | ICD-10-CM

## 2023-07-18 DIAGNOSIS — Z17.0 MALIGNANT NEOPLASM OF UPPER-INNER QUADRANT OF LEFT BREAST IN FEMALE, ESTROGEN RECEPTOR POSITIVE (HCC): Primary | ICD-10-CM

## 2023-07-18 NOTE — PROGRESS NOTES
[facility] Pre-Admission Testing Electronic Communication Worksheet for OR/ENDO Procedures        Patient: Patel Sheikh    DOS: 8/3/23    Arrival Time: 9:20AM    Surgery Time: 10:50AM    Meds to Bed:  [x] YES    []  NO    Transportation Confirmed: [x] YES    []  NO    History and Physical:  [] YES    [x]  NO  [] N/A  If yes, please list doctor or Urgent Care and date of H&P: DR. Ant Rivera MAKE APPOINTMENT    Additional Clearance(Cardiac, Pulmonary, etc):  [] YES    [x]  NO    Pre-Admission Testing Visit:  [] YES    [x]  NO If no, do labs/testing need to be done DOS?   [] YES    [x]  NO    Medication Reconciliation Complete:  [x] YES    []  NO        Additional Notes:                Interview Complete: [x] YES    []  NO          Dale Mejía RN  1:01 PM

## 2023-07-18 NOTE — PROGRESS NOTES
Follow Up Prior to Surgery    DOS: 8/3/23  :1970      History and Physical: patient will make appointment with Dr. Estefani Mck[facility]ley [facility] Physician)-please make sure this is completed for her surgery

## 2023-07-18 NOTE — PROGRESS NOTES
[address]  time___0920_________        Surgery time___10:50_________    Take the following medications with a sip of water: Follow your MD/Surgeons pre-procedure instructions regarding your medications     Do not eat or drink anything after 12:00 midnight prior to your surgery. This includes water chewing gum, mints and ice chips. You may brush your teeth and gargle the morning of your surgery, but do not swallow the water     Please see your family doctor/pediatrician for a history and physical and/or concerning medications. Bring any test results/reports from your physicians office. If you are under the care of a heart doctor or specialist doctor, please be aware that you may be asked to them for clearance    You may be asked to stop blood thinners such as Coumadin, Plavix, Fragmin, Lovenox, etc., or any anti-inflammatories such as:  Aspirin, Ibuprofen, Advil, Naproxen prior to your surgery. We also ask that you stop any OTC medications such as fish oil, vitamin E, glucosamine, garlic, Multivitamins, COQ 10, etc. MAY TAKE TYLENOL    We ask that you do not smoke 24 hours prior to surgery  We ask that you do not  drink any alcoholic beverages 24 hours prior to surgery     You must make arrangements for a responsible adult to take you home after your surgery. For your safety you will not be allowed to leave alone or drive yourself home. Your surgery will be cancelled if you do not have a ride home. Also for your safety, it is strongly suggested that someone stay with you the first 24 hours after your surgery. A parent or legal guardian must accompany a child scheduled for surgery and plan to stay at the hospital until the child is discharged. Please do not bring other children with you. For your comfort, please wear simple loose fitting clothing to the hospital.  Please do not bring valuables.     Do not wear any make-up or nail polish on

## 2023-07-28 ENCOUNTER — HOSPITAL ENCOUNTER (OUTPATIENT)
Dept: ULTRASOUND IMAGING | Age: 53
Discharge: HOME | End: 2023-07-28
Payer: COMMERCIAL

## 2023-07-28 ENCOUNTER — HOSPITAL ENCOUNTER (OUTPATIENT)
Dept: WOMENS IMAGING | Age: 53
Discharge: HOME | End: 2023-07-28
Payer: COMMERCIAL

## 2023-07-28 VITALS — SYSTOLIC BLOOD PRESSURE: 147 MMHG | DIASTOLIC BLOOD PRESSURE: 74 MMHG

## 2023-07-28 DIAGNOSIS — R92.8 ABNORMAL MAMMOGRAM: ICD-10-CM

## 2023-07-28 DIAGNOSIS — Z17.0 MALIGNANT NEOPLASM OF UPPER-INNER QUADRANT OF LEFT BREAST IN FEMALE, ESTROGEN RECEPTOR POSITIVE (HCC): ICD-10-CM

## 2023-07-28 DIAGNOSIS — C50.212 MALIGNANT NEOPLASM OF UPPER-INNER QUADRANT OF LEFT BREAST IN FEMALE, ESTROGEN RECEPTOR POSITIVE (HCC): ICD-10-CM

## 2023-07-28 PROCEDURE — G0279 TOMOSYNTHESIS, MAMMO: HCPCS

## 2023-07-28 PROCEDURE — 19285 PERQ DEV BREAST 1ST US IMAG: CPT

## 2023-07-28 ASSESSMENT — PAIN SCALES - GENERAL: PAINLEVEL_OUTOF10: 0

## 2023-07-28 NOTE — PROGRESS NOTES
NAME:  Fabiola Oneal  YOB: 1970  MEDICAL RECORD NUMBER:  [id number]  EPISODE DATE:  7/28/2023                             Expiration date of localization device was verified . LOCalizer [id number]    [id number] intact with no observable damage. TAG Localization performed by Dr. Karissa Sanchez for left breast site. Site dressed with steri-strips and Coverderm. Patient tolerated procedure well; alert and oriented x3. Patient discharged to home.        Electronically signed by Debra Pretty RN on 7/28/2023 at 2:59 PM

## 2023-08-02 ENCOUNTER — ANESTHESIA EVENT (OUTPATIENT)
Dept: OPERATING ROOM | Age: 53
End: 2023-08-02
Payer: COMMERCIAL

## 2023-08-03 ENCOUNTER — HOSPITAL ENCOUNTER (OUTPATIENT)
Dept: WOMENS IMAGING | Age: 53
Setting detail: OUTPATIENT SURGERY
Discharge: HOME | End: 2023-08-03
Attending: SURGERY
Payer: COMMERCIAL

## 2023-08-03 ENCOUNTER — HOSPITAL ENCOUNTER (OUTPATIENT)
Dept: NUCLEAR MEDICINE | Age: 53
Discharge: HOME | End: 2023-08-03
Attending: SURGERY
Payer: COMMERCIAL

## 2023-08-03 ENCOUNTER — ANESTHESIA (OUTPATIENT)
Dept: OPERATING ROOM | Age: 53
End: 2023-08-03
Payer: COMMERCIAL

## 2023-08-03 ENCOUNTER — HOSPITAL ENCOUNTER (OUTPATIENT)
Age: 53
Setting detail: OUTPATIENT SURGERY
Discharge: HOME | End: 2023-08-03
Attending: SURGERY | Admitting: SURGERY
Payer: COMMERCIAL

## 2023-08-03 ENCOUNTER — HOSPITAL ENCOUNTER (OUTPATIENT)
Dept: GENERAL RADIOLOGY | Age: 53
Discharge: HOME | End: 2023-08-03
Attending: SURGERY
Payer: COMMERCIAL

## 2023-08-03 VITALS
WEIGHT: 240 LBS | HEIGHT: 70 IN | BODY MASS INDEX: 34.36 KG/M2 | DIASTOLIC BLOOD PRESSURE: 76 MMHG | SYSTOLIC BLOOD PRESSURE: 120 MMHG | OXYGEN SATURATION: 97 % | HEART RATE: 76 BPM | TEMPERATURE: 97.6 F | RESPIRATION RATE: 16 BRPM

## 2023-08-03 DIAGNOSIS — C50.212 MALIGNANT NEOPLASM OF UPPER-INNER QUADRANT OF LEFT BREAST IN FEMALE, ESTROGEN RECEPTOR POSITIVE (HCC): ICD-10-CM

## 2023-08-03 DIAGNOSIS — Z17.0 MALIGNANT NEOPLASM OF UPPER-INNER QUADRANT OF LEFT BREAST IN FEMALE, ESTROGEN RECEPTOR POSITIVE (HCC): ICD-10-CM

## 2023-08-03 DIAGNOSIS — C50.911 MALIGNANT NEOPLASM OF RIGHT BREAST, STAGE 1, ESTROGEN RECEPTOR POSITIVE (HCC): ICD-10-CM

## 2023-08-03 DIAGNOSIS — Z17.0 MALIGNANT NEOPLASM OF RIGHT BREAST, STAGE 1, ESTROGEN RECEPTOR POSITIVE (HCC): ICD-10-CM

## 2023-08-03 DIAGNOSIS — R92.8 ABNORMAL MAMMOGRAM: ICD-10-CM

## 2023-08-03 PROCEDURE — 7100000000 HC PACU RECOVERY - FIRST 15 MIN: Performed by: SURGERY

## 2023-08-03 PROCEDURE — 3600000005 HC SURGERY LEVEL 5 BASE: Performed by: SURGERY

## 2023-08-03 PROCEDURE — 88342 IMHCHEM/IMCYTCHM 1ST ANTB: CPT

## 2023-08-03 PROCEDURE — 38525 BIOPSY/REMOVAL LYMPH NODES: CPT | Performed by: SURGERY

## 2023-08-03 PROCEDURE — 76098 X-RAY EXAM SURGICAL SPECIMEN: CPT

## 2023-08-03 PROCEDURE — 3600000015 HC SURGERY LEVEL 5 ADDTL 15MIN: Performed by: SURGERY

## 2023-08-03 PROCEDURE — 88305 TISSUE EXAM BY PATHOLOGIST: CPT

## 2023-08-03 PROCEDURE — 78195 LYMPH SYSTEM IMAGING: CPT

## 2023-08-03 PROCEDURE — 6370000000 HC RX 637 (ALT 250 FOR IP): Performed by: SURGERY

## 2023-08-03 PROCEDURE — 6360000002 HC RX W HCPCS

## 2023-08-03 PROCEDURE — 88307 TISSUE EXAM BY PATHOLOGIST: CPT

## 2023-08-03 PROCEDURE — 7100000001 HC PACU RECOVERY - ADDTL 15 MIN: Performed by: SURGERY

## 2023-08-03 PROCEDURE — 19294 PREPJ TUM CAV IORT PRTL MAST: CPT | Performed by: SURGERY

## 2023-08-03 PROCEDURE — 2580000003 HC RX 258: Performed by: SURGERY

## 2023-08-03 PROCEDURE — 7100000011 HC PHASE II RECOVERY - ADDTL 15 MIN: Performed by: SURGERY

## 2023-08-03 PROCEDURE — 3430000000 HC RX DIAGNOSTIC RADIOPHARMACEUTICAL: Performed by: SURGERY

## 2023-08-03 PROCEDURE — 6360000002 HC RX W HCPCS: Performed by: SURGERY

## 2023-08-03 PROCEDURE — 2720000010 HC SURG SUPPLY STERILE: Performed by: SURGERY

## 2023-08-03 PROCEDURE — 6370000000 HC RX 637 (ALT 250 FOR IP): Performed by: ANESTHESIOLOGY

## 2023-08-03 PROCEDURE — 2709999900 HC NON-CHARGEABLE SUPPLY: Performed by: SURGERY

## 2023-08-03 PROCEDURE — 7100000010 HC PHASE II RECOVERY - FIRST 15 MIN: Performed by: SURGERY

## 2023-08-03 PROCEDURE — 3700000001 HC ADD 15 MINUTES (ANESTHESIA): Performed by: SURGERY

## 2023-08-03 PROCEDURE — 2580000003 HC RX 258: Performed by: ANESTHESIOLOGY

## 2023-08-03 PROCEDURE — 19301 PARTIAL MASTECTOMY: CPT | Performed by: SURGERY

## 2023-08-03 PROCEDURE — A9520 TC99 TILMANOCEPT DIAG 0.5MCI: HCPCS | Performed by: SURGERY

## 2023-08-03 PROCEDURE — 14001 TIS TRNFR TRUNK 10.1-30SQCM: CPT | Performed by: SURGERY

## 2023-08-03 PROCEDURE — 6360000002 HC RX W HCPCS: Performed by: ANESTHESIOLOGY

## 2023-08-03 PROCEDURE — 3700000000 HC ANESTHESIA ATTENDED CARE: Performed by: SURGERY

## 2023-08-03 PROCEDURE — 2500000003 HC RX 250 WO HCPCS

## 2023-08-03 PROCEDURE — A4217 STERILE WATER/SALINE, 500 ML: HCPCS | Performed by: SURGERY

## 2023-08-03 PROCEDURE — A4648 IMPLANTABLE TISSUE MARKER: HCPCS | Performed by: SURGERY

## 2023-08-03 DEVICE — MARKER SURG TISS W2XH1XL2CM BIOZORB LO PROF: Type: IMPLANTABLE DEVICE | Site: BREAST | Status: FUNCTIONAL

## 2023-08-03 RX ORDER — LIDOCAINE HYDROCHLORIDE 20 MG/ML
INJECTION, SOLUTION EPIDURAL; INFILTRATION; INTRACAUDAL; PERINEURAL PRN
Status: DISCONTINUED | OUTPATIENT
Start: 2023-08-03 | End: 2023-08-03 | Stop reason: SDUPTHER

## 2023-08-03 RX ORDER — SODIUM CHLORIDE 0.9 % (FLUSH) 0.9 %
5-40 SYRINGE (ML) INJECTION EVERY 12 HOURS SCHEDULED
Status: DISCONTINUED | OUTPATIENT
Start: 2023-08-03 | End: 2023-08-03 | Stop reason: HOSPADM

## 2023-08-03 RX ORDER — SODIUM CHLORIDE 9 MG/ML
INJECTION, SOLUTION INTRAVENOUS PRN
Status: DISCONTINUED | OUTPATIENT
Start: 2023-08-03 | End: 2023-08-03 | Stop reason: HOSPADM

## 2023-08-03 RX ORDER — ONDANSETRON 2 MG/ML
INJECTION INTRAMUSCULAR; INTRAVENOUS PRN
Status: DISCONTINUED | OUTPATIENT
Start: 2023-08-03 | End: 2023-08-03 | Stop reason: SDUPTHER

## 2023-08-03 RX ORDER — ACETAMINOPHEN 325 MG/1
650 TABLET ORAL
Status: COMPLETED | OUTPATIENT
Start: 2023-08-03 | End: 2023-08-03

## 2023-08-03 RX ORDER — OXYCODONE HYDROCHLORIDE AND ACETAMINOPHEN 5; 325 MG/1; MG/1
1 TABLET ORAL EVERY 6 HOURS PRN
Qty: 8 TABLET | Refills: 0 | Status: SHIPPED | OUTPATIENT
Start: 2023-08-03 | End: 2023-08-06

## 2023-08-03 RX ORDER — SODIUM CHLORIDE 0.9 % (FLUSH) 0.9 %
5-40 SYRINGE (ML) INJECTION PRN
Status: DISCONTINUED | OUTPATIENT
Start: 2023-08-03 | End: 2023-08-03 | Stop reason: HOSPADM

## 2023-08-03 RX ORDER — PHENYLEPHRINE HCL IN 0.9% NACL 1 MG/10 ML
SYRINGE (ML) INTRAVENOUS PRN
Status: DISCONTINUED | OUTPATIENT
Start: 2023-08-03 | End: 2023-08-03 | Stop reason: SDUPTHER

## 2023-08-03 RX ORDER — FENTANYL CITRATE 0.05 MG/ML
50 INJECTION, SOLUTION INTRAMUSCULAR; INTRAVENOUS EVERY 5 MIN PRN
Status: DISCONTINUED | OUTPATIENT
Start: 2023-08-03 | End: 2023-08-03 | Stop reason: HOSPADM

## 2023-08-03 RX ORDER — EPHEDRINE SULFATE/0.9% NACL/PF 50 MG/5 ML
SYRINGE (ML) INTRAVENOUS PRN
Status: DISCONTINUED | OUTPATIENT
Start: 2023-08-03 | End: 2023-08-03 | Stop reason: SDUPTHER

## 2023-08-03 RX ORDER — ONDANSETRON 2 MG/ML
4 INJECTION INTRAMUSCULAR; INTRAVENOUS
Status: DISCONTINUED | OUTPATIENT
Start: 2023-08-03 | End: 2023-08-03 | Stop reason: HOSPADM

## 2023-08-03 RX ORDER — DEXAMETHASONE SODIUM PHOSPHATE 4 MG/ML
INJECTION, SOLUTION INTRA-ARTICULAR; INTRALESIONAL; INTRAMUSCULAR; INTRAVENOUS; SOFT TISSUE PRN
Status: DISCONTINUED | OUTPATIENT
Start: 2023-08-03 | End: 2023-08-03 | Stop reason: SDUPTHER

## 2023-08-03 RX ORDER — MAGNESIUM HYDROXIDE 1200 MG/15ML
LIQUID ORAL CONTINUOUS PRN
Status: COMPLETED | OUTPATIENT
Start: 2023-08-03 | End: 2023-08-03

## 2023-08-03 RX ORDER — FENTANYL CITRATE 50 UG/ML
INJECTION, SOLUTION INTRAMUSCULAR; INTRAVENOUS PRN
Status: DISCONTINUED | OUTPATIENT
Start: 2023-08-03 | End: 2023-08-03 | Stop reason: SDUPTHER

## 2023-08-03 RX ORDER — MIDAZOLAM HYDROCHLORIDE 1 MG/ML
INJECTION INTRAMUSCULAR; INTRAVENOUS PRN
Status: DISCONTINUED | OUTPATIENT
Start: 2023-08-03 | End: 2023-08-03 | Stop reason: SDUPTHER

## 2023-08-03 RX ORDER — PROPOFOL 10 MG/ML
INJECTION, EMULSION INTRAVENOUS PRN
Status: DISCONTINUED | OUTPATIENT
Start: 2023-08-03 | End: 2023-08-03 | Stop reason: SDUPTHER

## 2023-08-03 RX ORDER — FENTANYL CITRATE 0.05 MG/ML
25 INJECTION, SOLUTION INTRAMUSCULAR; INTRAVENOUS EVERY 5 MIN PRN
Status: DISCONTINUED | OUTPATIENT
Start: 2023-08-03 | End: 2023-08-03 | Stop reason: HOSPADM

## 2023-08-03 RX ORDER — BUPIVACAINE HYDROCHLORIDE 5 MG/ML
INJECTION, SOLUTION EPIDURAL; INTRACAUDAL
Status: COMPLETED | OUTPATIENT
Start: 2023-08-03 | End: 2023-08-03

## 2023-08-03 RX ORDER — MEPERIDINE HYDROCHLORIDE 25 MG/ML
12.5 INJECTION INTRAMUSCULAR; INTRAVENOUS; SUBCUTANEOUS EVERY 5 MIN PRN
Status: DISCONTINUED | OUTPATIENT
Start: 2023-08-03 | End: 2023-08-03 | Stop reason: HOSPADM

## 2023-08-03 RX ORDER — OXYCODONE HYDROCHLORIDE AND ACETAMINOPHEN 5; 325 MG/1; MG/1
1 TABLET ORAL
Status: COMPLETED | OUTPATIENT
Start: 2023-08-03 | End: 2023-08-03

## 2023-08-03 RX ADMIN — PROPOFOL 20 MG: 10 INJECTION, EMULSION INTRAVENOUS at 11:19

## 2023-08-03 RX ADMIN — SODIUM CHLORIDE: 9 INJECTION, SOLUTION INTRAVENOUS at 10:38

## 2023-08-03 RX ADMIN — TILMANOCEPT 0.5 MILLICURIE: KIT at 11:04

## 2023-08-03 RX ADMIN — Medication 100 MCG: at 11:12

## 2023-08-03 RX ADMIN — FENTANYL CITRATE 50 MCG: 50 INJECTION INTRAMUSCULAR; INTRAVENOUS at 10:45

## 2023-08-03 RX ADMIN — Medication 100 MCG: at 10:51

## 2023-08-03 RX ADMIN — MIDAZOLAM 2 MG: 1 INJECTION INTRAMUSCULAR; INTRAVENOUS at 10:33

## 2023-08-03 RX ADMIN — PROPOFOL 200 MG: 10 INJECTION, EMULSION INTRAVENOUS at 10:42

## 2023-08-03 RX ADMIN — DEXAMETHASONE SODIUM PHOSPHATE 8 MG: 4 INJECTION, SOLUTION INTRAMUSCULAR; INTRAVENOUS at 10:47

## 2023-08-03 RX ADMIN — FENTANYL CITRATE 25 MCG: 0.05 INJECTION, SOLUTION INTRAMUSCULAR; INTRAVENOUS at 12:19

## 2023-08-03 RX ADMIN — LIDOCAINE HYDROCHLORIDE 100 MG: 20 INJECTION, SOLUTION EPIDURAL; INFILTRATION; INTRACAUDAL; PERINEURAL at 10:42

## 2023-08-03 RX ADMIN — Medication 10 MG: at 11:36

## 2023-08-03 RX ADMIN — Medication 10 MG: at 11:43

## 2023-08-03 RX ADMIN — Medication 10 MG: at 10:59

## 2023-08-03 RX ADMIN — Medication 10 MG: at 11:18

## 2023-08-03 RX ADMIN — Medication 100 MCG: at 10:55

## 2023-08-03 RX ADMIN — FENTANYL CITRATE 25 MCG: 50 INJECTION INTRAMUSCULAR; INTRAVENOUS at 11:57

## 2023-08-03 RX ADMIN — CEFAZOLIN 2000 MG: 2 INJECTION, POWDER, FOR SOLUTION INTRAMUSCULAR; INTRAVENOUS at 10:42

## 2023-08-03 RX ADMIN — OXYCODONE HYDROCHLORIDE AND ACETAMINOPHEN 1 TABLET: 5; 325 TABLET ORAL at 12:56

## 2023-08-03 RX ADMIN — FENTANYL CITRATE 25 MCG: 50 INJECTION INTRAMUSCULAR; INTRAVENOUS at 11:50

## 2023-08-03 RX ADMIN — SODIUM CHLORIDE: 9 INJECTION, SOLUTION INTRAVENOUS at 12:11

## 2023-08-03 RX ADMIN — ACETAMINOPHEN 650 MG: 325 TABLET ORAL at 09:23

## 2023-08-03 RX ADMIN — FENTANYL CITRATE 25 MCG: 0.05 INJECTION, SOLUTION INTRAMUSCULAR; INTRAVENOUS at 12:25

## 2023-08-03 RX ADMIN — ONDANSETRON 4 MG: 2 INJECTION INTRAMUSCULAR; INTRAVENOUS at 11:43

## 2023-08-03 RX ADMIN — Medication 10 MG: at 11:07

## 2023-08-03 ASSESSMENT — PAIN DESCRIPTION - ORIENTATION
ORIENTATION: LEFT

## 2023-08-03 ASSESSMENT — PAIN DESCRIPTION - FREQUENCY
FREQUENCY: CONTINUOUS

## 2023-08-03 ASSESSMENT — PAIN DESCRIPTION - ONSET
ONSET: ON-GOING

## 2023-08-03 ASSESSMENT — PAIN SCALES - GENERAL
PAINLEVEL_OUTOF10: 6
PAINLEVEL_OUTOF10: 2
PAINLEVEL_OUTOF10: 5
PAINLEVEL_OUTOF10: 0
PAINLEVEL_OUTOF10: 4
PAINLEVEL_OUTOF10: 6

## 2023-08-03 ASSESSMENT — PAIN DESCRIPTION - DESCRIPTORS
DESCRIPTORS: BURNING

## 2023-08-03 ASSESSMENT — PAIN - FUNCTIONAL ASSESSMENT
PAIN_FUNCTIONAL_ASSESSMENT: PREVENTS OR INTERFERES SOME ACTIVE ACTIVITIES AND ADLS

## 2023-08-03 ASSESSMENT — PAIN DESCRIPTION - PAIN TYPE
TYPE: SURGICAL PAIN

## 2023-08-03 ASSESSMENT — PAIN DESCRIPTION - LOCATION
LOCATION: BREAST

## 2023-08-03 NOTE — H&P
Update History & Physical    The patient's History and Physical of July 27, 2023 was reviewed with the patient and I examined the patient. There was no change. The surgical site was confirmed by the patient and me. Plan: The risks, benefits, expected outcome, and alternative to the recommended procedure have been discussed with the patient. Patient understands and wants to proceed with the procedure.      Electronically signed by Benigno Loving MD on 8/3/2023 at 10:20 AM

## 2023-08-03 NOTE — OP NOTE
Tracer(s) used to identify sentinel nodes in the upfront surgery (non-neoadjuvant) setting (select all that apply). radioisotope   Tracer(s) used to identify sentinel nodes in the neoadjuvant setting (select all that apply). N/A   All nodes (colored or non-colored) present at the end of a dye-filled lymphatic channel were removed. N/A   All significantly radioactive nodes were removed. Yes   All palpably suspicious nodes were removed. Yes   Biopsy-proven positive nodes marked with clips prior to chemotherapy were identified and removed. N/A         Detailed Description of Procedure:              Operative Report      Name:  Chelsy Coronel   MRN:  [id number]  Date:  8/3/2023        PREOPERATIVE DIAGNOSIS: left breast cancer. POSTOPERATIVE DIAGNOSIS: same    PROCEDURE: RFID localized left partial mastectomy, left axillary sentinel lymph node biopsy. SURGEON: Jean Carlos    ESTIMATED BLOOD LOSS:  Less than 25 mL    SPECIMENS: left breast tissue, sentinel nodes    ASSISTANT: KEV Thomas    ANESTHESIA: General.    FINDINGS: see op note    INDICATIONS FOR PROCEDURE: The patient is a 52 y.o. female who had  presented with breast cancer. She is here now for RFID localized partial mastectomy and axillary sentinel lymph node biopsy for clinical stage T1N0 ductal carcinoma. The risks, benefits and alternatives were discussed with the  patient. Questions were answered and she is agreeable to proceed. MsEdith Satish Dejuan was met by me in the preoperative area. The surgical sites were identified. Consent was obtained. The appropriate breast imaging was reviewed. DESCRIPTION OF OPERATION: The patient was brought to the operating room and  placed on the OR table in the supine position. General anesthesia was  obtained. The patient had radioisotope injection preoperatively per nuclear medicine for identification of the axillary sentinel node.  The left breast and axillary region were then prepped and draped in the usual sterile lymph node within the axillary region. This node was dissected free from the surrounding tissue, and vessels and lymphatics were divided using the Harmonic scalpel. This node had a count of 1080 and was sent to pathology as left axillary sentinel lymph node. There were additional suspicious nodes in the area, and I removed these in the same fashion and sent them to pathology as additional left axillary contents. The residual gamma probe activity within the axillary region was minimal. There were no other enlarged lymph nodes palpated within the axillary region. Hemostasis was assured. The wounds were irrigated and injected with local anesthetic, and both wounds were closed with layered vicryl, and the skin was re-approximated with 4-0 Monocryl and covered with surgical glue. Patient was placed into a surgical bra. Sponge, needle and instrument counts were correct per nursing. The patient tolerated the procedure well. She was extubated and taken to the  recovery room in stable condition.       Electronically signed by Qing Li MD on 8/3/2023 at 11:58 AM          Electronically signed by Qing Li MD on 8/3/2023 at 11:57 AM

## 2023-08-03 NOTE — PROGRESS NOTES
VSS. Pain 4/10 in left breast. Discharge instructions reviewed with pt,  and mother. All verbalize an understanding of instructions. Pt dressed sitting up on stretcher. Wheeled pt to 's car for discharge.

## 2023-08-03 NOTE — DISCHARGE INSTRUCTIONS
1.  Diet:  Regular diet as tolerated. 2.  Activity:  No lifting greater than 15 pounds for 3-4 days. Then activity as tolerated per comfort. 3.  Incision care: May shower over incision glue starting tomorrow. 4.  No lotion to surgical glue. 5.  Please call for follow-up appointment for 2 weeks [phone].   6.  Medications:  Continue your home medications. Can take Tylenol or ibuprofen or prescription pain medication. 7.  Call for temperature greater than 101 degrees F or for excessive bleeding or pain or swelling or inability to void. 8.  May take over the counter laxative or stool softeners as needed. 9.  No swimming for 2 weeks. 10. Wear bra day and night for 1 week per comfort.     Office Phone Number:  [phone]

## 2023-08-03 NOTE — ANESTHESIA POSTPROCEDURE EVALUATION
Department of Anesthesiology  Postprocedure Note    Patient: Christal Pedersen  MRN: [id number]  YOB: 1970  Date of evaluation: 8/3/2023      Procedure Summary     Date: 08/03/23 Room / Location: [address] [place] [address]    Anesthesia Start: 1037 Anesthesia Stop: 1211    Procedures:       RADIOFREQUENCY IDENTIFIED DEVICE LEFT BREAST LUMPECTOMY (Left: Breast)      LEFT AXILLARY SENTINEL NODE BIOPSY (Left: Axilla) Diagnosis:       Malignant neoplasm of upper-inner quadrant of left breast in female, estrogen receptor positive ([address])      (Malignant neoplasm of upper-inner quadrant of left breast in female, estrogen receptor positive ([address]) Christiano Whitaker Z17.0])    Surgeons: Jadiel Key MD Responsible Provider: Emmanuel Mart MD    Anesthesia Type: general ASA Status: 3          Anesthesia Type: No value filed.     Nelson Phase I: Nelson Score: 10    Nelson Phase II: Nelson Score: 10      Anesthesia Post Evaluation    Patient location during evaluation: PACU  Patient participation: complete - patient participated  Level of consciousness: awake and alert  Pain score: 2  Airway patency: patent  Nausea & Vomiting: no nausea and no vomiting  Complications: no  Cardiovascular status: blood pressure returned to baseline  Respiratory status: acceptable  Hydration status: euvolemic  Pain management: adequate

## 2023-08-03 NOTE — PROGRESS NOTES
Pt awake on arrival to phase II. Burning 4/10 in left breast and axilla. VSS. Ice pack to left breast. VSS. Given snack and call light.  to room. Called [facility] in retail pharmacy for prescription.

## 2023-08-03 NOTE — PROGRESS NOTES
To pacu from OR. Pt awake, states pain 2/10 and tolerable. Dressing to left breast dry and intact. Radial pulses palpable. IV infusing. Monitor in sinus rhythm.

## 2023-08-08 ENCOUNTER — TELEPHONE (OUTPATIENT)
Dept: SURGERY | Age: 53
End: 2023-08-08

## 2023-08-21 ENCOUNTER — OFFICE VISIT (OUTPATIENT)
Dept: BREAST CENTER | Age: 53
End: 2023-08-21

## 2023-08-21 VITALS
OXYGEN SATURATION: 98 % | RESPIRATION RATE: 16 BRPM | DIASTOLIC BLOOD PRESSURE: 74 MMHG | SYSTOLIC BLOOD PRESSURE: 125 MMHG | BODY MASS INDEX: 35.25 KG/M2 | HEIGHT: 69 IN | HEART RATE: 88 BPM | WEIGHT: 238 LBS

## 2023-08-21 DIAGNOSIS — Z90.13 S/P PARTIAL MASTECTOMY, BILATERAL: ICD-10-CM

## 2023-08-21 DIAGNOSIS — Z85.3 HISTORY OF RIGHT BREAST CANCER: ICD-10-CM

## 2023-08-21 DIAGNOSIS — Z17.0 MALIGNANT NEOPLASM OF UPPER-INNER QUADRANT OF LEFT BREAST IN FEMALE, ESTROGEN RECEPTOR POSITIVE (HCC): Primary | ICD-10-CM

## 2023-08-21 DIAGNOSIS — C50.212 MALIGNANT NEOPLASM OF UPPER-INNER QUADRANT OF LEFT BREAST IN FEMALE, ESTROGEN RECEPTOR POSITIVE (HCC): Primary | ICD-10-CM

## 2023-08-21 PROCEDURE — 99024 POSTOP FOLLOW-UP VISIT: CPT | Performed by: SURGERY

## 2023-08-21 NOTE — PROGRESS NOTES
8/21/2023    Chief Complaint   Patient presents with    Post-Op Check     L breast lumpectomy post op        HPI Patient is s/p left partial mastectomy/snbx/Biozorb 8/3/2023 for grade 2 T1cN0 ductal carcinoma, ER/IL positive, Her2 negative. Wounds healing well, instructed on wound care. Having some left axillary discomfort. She is seeing oncology this afternoon. Follow up 4 months. Patient has a history of right DCIS, ER positive, s/p right lumpectomy 2009 (at age 37). Postop radiation, 5 years of Tamoxifen.       Genetic testing 7/2021 [place] [address] gene panel negative      Current Outpatient Medications:     methylPREDNISolone (MEDROL DOSEPACK) 4 MG tablet, Take by mouth., Disp: 1 kit, Rfl: 0    promethazine-dextromethorphan (PROMETHAZINE-DM) 6.25-15 MG/5ML syrup, Take 5 mLs by mouth 4 times daily as needed for Cough, Disp: 180 mL, Rfl: 0    lisinopril-hydroCHLOROthiazide (PRINZIDE;ZESTORETIC) 10-12.5 MG per tablet, Take 1 tablet by mouth daily, Disp: 90 tablet, Rfl: 0    Cholecalciferol (VITAMIN D) 50 MCG (2000 UT) CAPS capsule, One a day (Patient taking differently: Take 1 capsule by mouth daily One a day), Disp: 90 capsule, Rfl: 5    naproxen (NAPROSYN) 250 MG tablet, Take 1 tablet by mouth daily as needed for Pain, Disp: , Rfl:     Tirzepatide (MOUNJARO) 10 MG/0.5ML SOPN SC injection, Inject 0.5 mLs into the skin once a week for 4 doses (Patient taking differently: Inject 0.5 mLs into the skin once a week FOR WEIGHT LOSS), Disp: 2 mL, Rfl: 5      Past Medical History:   Diagnosis Date    Arthritis of knee, right     Benign essential HTN     BRCA1 negative     BRCA2 negative     Breast CA ([address]) 2009    right--breast---radiation and tamoxifen : neg BRCA testing    Combined hyperlipidemia     NO MEDS    History of therapeutic radiation     Morbid obesity with BMI of 40.0-44.9, adult (HCC)     contrave failed    Prolonged emergence from general anesthesia     PATIENT AND FAMILY HX    Wears glasses        Past

## 2023-08-21 NOTE — PATIENT INSTRUCTIONS
Breast exam performed, no palpable masses. Continue self breast exams    Healthy Lifestyle Recommendations: healthy diet (decrease consumption of red meat, increase fresh fruits and vegetables), decreased alcohol consumption (less than 4 drinks/week), adequate sleep (goal 6-8 hours), routine exercise (goal 150 minutes/week or greater), weight control.      Return:December 2023 breast exam only

## 2023-10-25 ENCOUNTER — HOSPITAL ENCOUNTER (OUTPATIENT)
Dept: WOMENS IMAGING | Age: 53
Discharge: HOME | End: 2023-10-25
Attending: INTERNAL MEDICINE
Payer: COMMERCIAL

## 2023-10-25 DIAGNOSIS — M81.0 POST-MENOPAUSAL OSTEOPOROSIS: ICD-10-CM

## 2023-10-25 PROCEDURE — 77080 DXA BONE DENSITY AXIAL: CPT

## 2024-05-22 ENCOUNTER — TELEPHONE (OUTPATIENT)
Dept: CASE MANAGEMENT | Age: 54
End: 2024-05-22

## 2024-05-29 ENCOUNTER — TELEPHONE (OUTPATIENT)
Dept: ADMINISTRATIVE | Age: 54
End: 2024-05-29

## 2024-05-29 NOTE — TELEPHONE ENCOUNTER
Received a PA request in [facility] for Zepbound 10MG/0.5ML pen-injectors To initiate an authorization request for this medication, please contact [facility] at [phone].     Called Pharmacy . Was advised the medication will need to be ran under pharmacy as   [facility] [phone]  [id number]  If primary does cover use code [id number]  If primary does not cover use code [id number]    Called pharmacy and patient already picked up.

## 2024-06-09 NOTE — PROCEDURE: TREATMENT REGIMEN
Otc Regimen: Apply Sunscreen QAM: minimum of 30 SPF (suggest CeraVe brand, mineral based)
Detail Level: Simple
Action 4: Continue
Bed/Stretcher in lowest position, wheels locked, appropriate side rails in place/Call bell, personal items and telephone in reach/Instruct patient to call for assistance before getting out of bed/chair/stretcher/Non-slip footwear applied when patient is off stretcher/[place] to call system/Physically safe environment - no spills, clutter or unnecessary equipment/Purposeful proactive rounding/Room/bathroom lighting operational, light cord in reach
Detail Level: Zone
Otc Regimen: Apply Sunscreen QAM: minimum of 30 SPF (suggest CeraVe brand, mineral based).
Otc Regimen: Suggested CeraVe sunscreen SPF 30 minimum, mineral based

## 2024-07-21 ENCOUNTER — TELEPHONE (OUTPATIENT)
Dept: CASE MANAGEMENT | Age: 54
End: 2024-07-21

## 2024-07-28 ENCOUNTER — OFFICE VISIT (OUTPATIENT)
Age: 54
End: 2024-07-28

## 2024-07-28 VITALS
HEIGHT: 70 IN | WEIGHT: 244.2 LBS | OXYGEN SATURATION: 95 % | TEMPERATURE: 98.9 F | DIASTOLIC BLOOD PRESSURE: 81 MMHG | BODY MASS INDEX: 34.96 KG/M2 | SYSTOLIC BLOOD PRESSURE: 125 MMHG | HEART RATE: 92 BPM

## 2024-07-28 DIAGNOSIS — N30.01 ACUTE CYSTITIS WITH HEMATURIA: Primary | ICD-10-CM

## 2024-07-28 DIAGNOSIS — N39.0 URINARY TRACT INFECTION WITHOUT HEMATURIA, SITE UNSPECIFIED: ICD-10-CM

## 2024-07-28 RX ORDER — PHENAZOPYRIDINE HYDROCHLORIDE 200 MG/1
200 TABLET, FILM COATED ORAL 3 TIMES DAILY PRN
Qty: 9 TABLET | Refills: 0 | Status: SHIPPED | OUTPATIENT
Start: 2024-07-28 | End: 2024-07-31

## 2024-07-28 RX ORDER — SULFAMETHOXAZOLE AND TRIMETHOPRIM 800; 160 MG/1; MG/1
1 TABLET ORAL 2 TIMES DAILY
Qty: 10 TABLET | Refills: 0 | Status: SHIPPED | OUTPATIENT
Start: 2024-07-28 | End: 2024-08-02

## 2024-07-30 LAB
BACTERIA UR CULT: ABNORMAL
ORGANISM: ABNORMAL

## 2024-08-12 ENCOUNTER — HOSPITAL ENCOUNTER (OUTPATIENT)
Dept: WOMENS IMAGING | Age: 54
Discharge: HOME | End: 2024-08-12
Payer: COMMERCIAL

## 2024-08-12 ENCOUNTER — OFFICE VISIT (OUTPATIENT)
Dept: BREAST CENTER | Age: 54
End: 2024-08-12
Payer: COMMERCIAL

## 2024-08-12 VITALS
SYSTOLIC BLOOD PRESSURE: 118 MMHG | BODY MASS INDEX: 36.14 KG/M2 | DIASTOLIC BLOOD PRESSURE: 66 MMHG | RESPIRATION RATE: 18 BRPM | HEIGHT: 69 IN | HEART RATE: 78 BPM | OXYGEN SATURATION: 99 % | WEIGHT: 244 LBS

## 2024-08-12 VITALS — HEIGHT: 69 IN | WEIGHT: 244 LBS | BODY MASS INDEX: 36.14 KG/M2

## 2024-08-12 DIAGNOSIS — C50.212 MALIGNANT NEOPLASM OF UPPER-INNER QUADRANT OF LEFT BREAST IN FEMALE, ESTROGEN RECEPTOR POSITIVE (HCC): Primary | ICD-10-CM

## 2024-08-12 DIAGNOSIS — Z85.3 HISTORY OF RIGHT BREAST CANCER: ICD-10-CM

## 2024-08-12 DIAGNOSIS — Z90.13 S/P PARTIAL MASTECTOMY, BILATERAL: ICD-10-CM

## 2024-08-12 DIAGNOSIS — Z17.0 MALIGNANT NEOPLASM OF UPPER-INNER QUADRANT OF LEFT BREAST IN FEMALE, ESTROGEN RECEPTOR POSITIVE (HCC): ICD-10-CM

## 2024-08-12 DIAGNOSIS — Z17.0 MALIGNANT NEOPLASM OF UPPER-INNER QUADRANT OF LEFT BREAST IN FEMALE, ESTROGEN RECEPTOR POSITIVE (HCC): Primary | ICD-10-CM

## 2024-08-12 DIAGNOSIS — C50.212 MALIGNANT NEOPLASM OF UPPER-INNER QUADRANT OF LEFT BREAST IN FEMALE, ESTROGEN RECEPTOR POSITIVE (HCC): ICD-10-CM

## 2024-08-12 PROCEDURE — 3074F SYST BP LT 130 MM HG: CPT | Performed by: SURGERY

## 2024-08-12 PROCEDURE — G0279 TOMOSYNTHESIS, MAMMO: HCPCS

## 2024-08-12 PROCEDURE — 99213 OFFICE O/P EST LOW 20 MIN: CPT | Performed by: SURGERY

## 2024-08-12 PROCEDURE — 3078F DIAST BP <80 MM HG: CPT | Performed by: SURGERY

## 2024-08-12 NOTE — PATIENT INSTRUCTIONS
Mammogram reviewed, no concerning findings  Breast exam performed, no palpable masses.    Continue self breast exams    Healthy Lifestyle Recommendations: healthy diet (decrease consumption of red meat, increase fresh fruits and vegetables), decreased alcohol consumption (less than 4 drinks/week), adequate sleep (goal 6-8 hours), routine exercise (goal 150 minutes/week or greater), weight control.     Return: 1yr with bilateral diagnostic mammogram and breast check

## 2024-08-12 NOTE — PROGRESS NOTES
8/12/2024    Chief Complaint   Patient presents with    6 Month Follow-Up        HPI Patient is s/p left partial mastectomy/snbx/Biozorb 8/2023 for grade 2 T1cN0 ductal carcinoma, ER/HI positive, Her2 negative. Oncotype 3. Postop radiation, finished 10/2023. Taking Aromasin, tolerating well. She has not felt any breast masses, no breast pain or nipple discharge.      Patient has a history of right DCIS, ER positive, s/p right lumpectomy 2009 (at age 39). Postop radiation, 5 years of Tamoxifen.      Genetic testing 7/2021 [facility] gene panel negative    Bilateral diagnostic mammogram today BIRADS 2B.       Current Outpatient Medications:     nitrofurantoin, macrocrystal-monohydrate, (MACROBID) 100 MG capsule, Take 1 capsule by mouth 2 times daily for 5 days, Disp: 10 capsule, Rfl: 0    phenazopyridine (PYRIDIUM) 200 MG tablet, Take 1 tablet by mouth 3 times daily as needed for Pain, Disp: , Rfl:     Tirzepatide-Weight Management (ZEPBOUND) 12.5 MG/0.5ML SOAJ, Inject 0.5 mLs into the skin once a week, Disp: 2 mL, Rfl: 3    lisinopril-hydroCHLOROthiazide (PRINZIDE;ZESTORETIC) 10-12.5 MG per tablet, Take 1 tablet by mouth daily, Disp: 90 tablet, Rfl: 3    naltrexone-buPROPion (CONTRAVE) 8-90 MG per extended release tablet, One a day x 1wk then one bid x 1wk then one/two bid x1wk then two po bid indefinitely, Disp: 120 tablet, Rfl: 5    exemestane (AROMASIN) 25 MG tablet, Take 1 tablet by mouth daily, Disp: , Rfl:     vitamin D (CHOLECALCIFEROL) 50 MCG (2000 UT) TABS tablet, TAKE 1 TABLET BY MOUTH ONE TIME A DAY, Disp: 90 tablet, Rfl: 5    naproxen (NAPROSYN) 250 MG tablet, Take 1 tablet by mouth daily as needed for Pain, Disp: , Rfl:       Past Medical History:   Diagnosis Date    Arthritis of knee, right     Benign essential HTN     BRCA1 negative     BRCA2 negative     Breast CA (HCC) 2009    right--breast---radiation and tamoxifen : neg BRCA testing    Breast cancer, left (HCC) 2023    lumpectomy/xrt/ po med-

## 2024-08-21 ENCOUNTER — TELEPHONE (OUTPATIENT)
Dept: CASE MANAGEMENT | Age: 54
End: 2024-08-21

## 2024-08-21 NOTE — TELEPHONE ENCOUNTER
Lung Cancer Screening Radiology Recommendation reminder letter mailed to patient, this is patients 3rd & final reminder letter.   Patients ordering provider notified via EMR direct message, verification received.   Current documented smoking history reviewed.

## 2025-07-08 NOTE — PROGRESS NOTES
[facility] PRE-SURGICAL TESTING INSTRUCTIONS                      PRIOR TO PROCEDURE DATE:    1. PLEASE FOLLOW ANY INSTRUCTIONS GIVEN TO YOU PER YOUR SURGEON.      2. Arrange for someone to drive you home and be with you for the first 24 hours after discharge for your safety after your procedure for which you received sedation. Ensure it is someone we can share information with regarding your discharge.     NOTE: At this time ONLY 2 ADULTS may accompany you   One person ENCOURAGED to stay at hospital entire time if outpatient surgery      3. You must contact your surgeon for instructions IF:  You are taking any blood thinners, aspirin, anti-inflammatory or vitamins.  There is a change in your physical condition such as a cold, fever, rash, cuts, sores, or any other infection, especially near your surgical site.    4. Do not drink alcohol the day before or day of your procedure.  Do not use any recreational marijuana at least 24 hours or street drugs (heroin, cocaine) at minimum 5 days prior to your procedure.     5. A Pre-Surgical History and Physical MUST be completed WITHIN 30 DAYS OR LESS prior to your procedure.by your Physician or an Urgent Care        THE DAY OF YOUR PROCEDURE:  1.  Follow instructions for ARRIVAL TIME as DIRECTED BY YOUR SURGEON.     2. Enter the MAIN entrance from [facility] and follow the signs to the free Parking Garage or  Parking (offered free of charge 7 am-5pm).      3. Enter the Main Entrance of the hospital (do not enter from the lower level of the parking garage). Upon entrance, check in with the  at the surgical information desk on your LEFT.   Bring your insurance card and photo ID to register      4. DO NOT EAT ANYTHING AFTER MIDNIGHT prior to arrival for surgery.    NOTE: ALL Gastric, Bariatric & Bowel surgery patients - you MUST follow your surgeon's instructions regarding eating/ drinking as you will have very specific instructions to follow.  If

## 2025-07-10 ENCOUNTER — ANESTHESIA EVENT (OUTPATIENT)
Dept: OPERATING ROOM | Age: 55
End: 2025-07-10
Payer: COMMERCIAL

## 2025-07-11 ENCOUNTER — HOSPITAL ENCOUNTER (OUTPATIENT)
Age: 55
Setting detail: OUTPATIENT SURGERY
Discharge: HOME | End: 2025-07-11
Attending: ORTHOPAEDIC SURGERY | Admitting: ORTHOPAEDIC SURGERY
Payer: COMMERCIAL

## 2025-07-11 ENCOUNTER — ANESTHESIA (OUTPATIENT)
Dept: OPERATING ROOM | Age: 55
End: 2025-07-11
Payer: COMMERCIAL

## 2025-07-11 VITALS
SYSTOLIC BLOOD PRESSURE: 128 MMHG | HEIGHT: 70 IN | RESPIRATION RATE: 14 BRPM | DIASTOLIC BLOOD PRESSURE: 75 MMHG | WEIGHT: 239.2 LBS | HEART RATE: 75 BPM | OXYGEN SATURATION: 95 % | BODY MASS INDEX: 34.24 KG/M2 | TEMPERATURE: 97.2 F

## 2025-07-11 DIAGNOSIS — S69.82XA INJURY OF TRIANGULAR FIBROCARTILAGE COMPLEX (TFCC) OF LEFT WRIST, INITIAL ENCOUNTER: Primary | ICD-10-CM

## 2025-07-11 PROCEDURE — 3600000014 HC SURGERY LEVEL 4 ADDTL 15MIN: Performed by: ORTHOPAEDIC SURGERY

## 2025-07-11 PROCEDURE — 6360000002 HC RX W HCPCS

## 2025-07-11 PROCEDURE — 7100000001 HC PACU RECOVERY - ADDTL 15 MIN: Performed by: ORTHOPAEDIC SURGERY

## 2025-07-11 PROCEDURE — 2500000003 HC RX 250 WO HCPCS

## 2025-07-11 PROCEDURE — 3700000000 HC ANESTHESIA ATTENDED CARE: Performed by: ORTHOPAEDIC SURGERY

## 2025-07-11 PROCEDURE — 2709999900 HC NON-CHARGEABLE SUPPLY: Performed by: ORTHOPAEDIC SURGERY

## 2025-07-11 PROCEDURE — 3600000004 HC SURGERY LEVEL 4 BASE: Performed by: ORTHOPAEDIC SURGERY

## 2025-07-11 PROCEDURE — 6360000002 HC RX W HCPCS: Performed by: ORTHOPAEDIC SURGERY

## 2025-07-11 PROCEDURE — 6360000002 HC RX W HCPCS: Performed by: ANESTHESIOLOGY

## 2025-07-11 PROCEDURE — 7100000000 HC PACU RECOVERY - FIRST 15 MIN: Performed by: ORTHOPAEDIC SURGERY

## 2025-07-11 PROCEDURE — 64415 NJX AA&/STRD BRCH PLXS IMG: CPT | Performed by: ANESTHESIOLOGY

## 2025-07-11 PROCEDURE — 2580000003 HC RX 258: Performed by: ORTHOPAEDIC SURGERY

## 2025-07-11 PROCEDURE — 6370000000 HC RX 637 (ALT 250 FOR IP): Performed by: ORTHOPAEDIC SURGERY

## 2025-07-11 PROCEDURE — 3700000001 HC ADD 15 MINUTES (ANESTHESIA): Performed by: ORTHOPAEDIC SURGERY

## 2025-07-11 PROCEDURE — 2580000003 HC RX 258: Performed by: ANESTHESIOLOGY

## 2025-07-11 PROCEDURE — 2500000003 HC RX 250 WO HCPCS: Performed by: ORTHOPAEDIC SURGERY

## 2025-07-11 PROCEDURE — 7100000010 HC PHASE II RECOVERY - FIRST 15 MIN: Performed by: ORTHOPAEDIC SURGERY

## 2025-07-11 PROCEDURE — 7100000011 HC PHASE II RECOVERY - ADDTL 15 MIN: Performed by: ORTHOPAEDIC SURGERY

## 2025-07-11 RX ORDER — HYDRALAZINE HYDROCHLORIDE 20 MG/ML
10 INJECTION INTRAMUSCULAR; INTRAVENOUS
Status: DISCONTINUED | OUTPATIENT
Start: 2025-07-11 | End: 2025-07-11 | Stop reason: HOSPADM

## 2025-07-11 RX ORDER — MEPERIDINE HYDROCHLORIDE 25 MG/ML
12.5 INJECTION INTRAMUSCULAR; INTRAVENOUS; SUBCUTANEOUS EVERY 5 MIN PRN
Status: DISCONTINUED | OUTPATIENT
Start: 2025-07-11 | End: 2025-07-11 | Stop reason: HOSPADM

## 2025-07-11 RX ORDER — MAGNESIUM HYDROXIDE 1200 MG/15ML
LIQUID ORAL CONTINUOUS PRN
Status: DISCONTINUED | OUTPATIENT
Start: 2025-07-11 | End: 2025-07-11 | Stop reason: HOSPADM

## 2025-07-11 RX ORDER — ONDANSETRON 2 MG/ML
INJECTION INTRAMUSCULAR; INTRAVENOUS
Status: DISCONTINUED | OUTPATIENT
Start: 2025-07-11 | End: 2025-07-11 | Stop reason: SDUPTHER

## 2025-07-11 RX ORDER — GLYCOPYRROLATE 0.2 MG/ML
INJECTION INTRAMUSCULAR; INTRAVENOUS
Status: DISCONTINUED | OUTPATIENT
Start: 2025-07-11 | End: 2025-07-11 | Stop reason: SDUPTHER

## 2025-07-11 RX ORDER — METOCLOPRAMIDE HYDROCHLORIDE 5 MG/ML
10 INJECTION INTRAMUSCULAR; INTRAVENOUS
Status: DISCONTINUED | OUTPATIENT
Start: 2025-07-11 | End: 2025-07-11 | Stop reason: HOSPADM

## 2025-07-11 RX ORDER — EPHEDRINE SULFATE 50 MG/ML
INJECTION INTRAVENOUS
Status: DISCONTINUED | OUTPATIENT
Start: 2025-07-11 | End: 2025-07-11 | Stop reason: SDUPTHER

## 2025-07-11 RX ORDER — MIDAZOLAM HYDROCHLORIDE 1 MG/ML
INJECTION, SOLUTION INTRAMUSCULAR; INTRAVENOUS
Status: DISCONTINUED
Start: 2025-07-11 | End: 2025-07-11 | Stop reason: HOSPADM

## 2025-07-11 RX ORDER — PROPOFOL 10 MG/ML
INJECTION, EMULSION INTRAVENOUS
Status: DISCONTINUED | OUTPATIENT
Start: 2025-07-11 | End: 2025-07-11 | Stop reason: SDUPTHER

## 2025-07-11 RX ORDER — MIDAZOLAM HYDROCHLORIDE 1 MG/ML
INJECTION, SOLUTION INTRAMUSCULAR; INTRAVENOUS
Status: DISCONTINUED | OUTPATIENT
Start: 2025-07-11 | End: 2025-07-11 | Stop reason: SDUPTHER

## 2025-07-11 RX ORDER — LIDOCAINE HYDROCHLORIDE 10 MG/ML
1 INJECTION, SOLUTION EPIDURAL; INFILTRATION; INTRACAUDAL; PERINEURAL
Status: DISCONTINUED | OUTPATIENT
Start: 2025-07-11 | End: 2025-07-11 | Stop reason: HOSPADM

## 2025-07-11 RX ORDER — DIPHENHYDRAMINE HYDROCHLORIDE 50 MG/ML
12.5 INJECTION, SOLUTION INTRAMUSCULAR; INTRAVENOUS
Status: DISCONTINUED | OUTPATIENT
Start: 2025-07-11 | End: 2025-07-11 | Stop reason: HOSPADM

## 2025-07-11 RX ORDER — ROPIVACAINE HYDROCHLORIDE 5 MG/ML
INJECTION, SOLUTION EPIDURAL; INFILTRATION; PERINEURAL
Status: DISCONTINUED
Start: 2025-07-11 | End: 2025-07-11 | Stop reason: HOSPADM

## 2025-07-11 RX ORDER — SODIUM CHLORIDE 9 MG/ML
INJECTION, SOLUTION INTRAVENOUS PRN
Status: DISCONTINUED | OUTPATIENT
Start: 2025-07-11 | End: 2025-07-11 | Stop reason: HOSPADM

## 2025-07-11 RX ORDER — HYDROMORPHONE HYDROCHLORIDE 1 MG/ML
0.5 INJECTION, SOLUTION INTRAMUSCULAR; INTRAVENOUS; SUBCUTANEOUS EVERY 5 MIN PRN
Status: DISCONTINUED | OUTPATIENT
Start: 2025-07-11 | End: 2025-07-11 | Stop reason: HOSPADM

## 2025-07-11 RX ORDER — LABETALOL HYDROCHLORIDE 5 MG/ML
10 INJECTION, SOLUTION INTRAVENOUS
Status: DISCONTINUED | OUTPATIENT
Start: 2025-07-11 | End: 2025-07-11 | Stop reason: HOSPADM

## 2025-07-11 RX ORDER — SODIUM CHLORIDE, SODIUM LACTATE, POTASSIUM CHLORIDE, CALCIUM CHLORIDE 600; 310; 30; 20 MG/100ML; MG/100ML; MG/100ML; MG/100ML
INJECTION, SOLUTION INTRAVENOUS CONTINUOUS
Status: DISCONTINUED | OUTPATIENT
Start: 2025-07-11 | End: 2025-07-11 | Stop reason: HOSPADM

## 2025-07-11 RX ORDER — PHENYLEPHRINE HCL IN 0.9% NACL 1 MG/10 ML
SYRINGE (ML) INTRAVENOUS
Status: DISCONTINUED | OUTPATIENT
Start: 2025-07-11 | End: 2025-07-11 | Stop reason: SDUPTHER

## 2025-07-11 RX ORDER — FENTANYL CITRATE 50 UG/ML
INJECTION, SOLUTION INTRAMUSCULAR; INTRAVENOUS
Status: DISCONTINUED
Start: 2025-07-11 | End: 2025-07-11 | Stop reason: HOSPADM

## 2025-07-11 RX ORDER — FENTANYL CITRATE 50 UG/ML
INJECTION, SOLUTION INTRAMUSCULAR; INTRAVENOUS
Status: DISCONTINUED | OUTPATIENT
Start: 2025-07-11 | End: 2025-07-11 | Stop reason: SDUPTHER

## 2025-07-11 RX ORDER — HYDROMORPHONE HYDROCHLORIDE 1 MG/ML
0.5 INJECTION, SOLUTION INTRAMUSCULAR; INTRAVENOUS; SUBCUTANEOUS EVERY 10 MIN PRN
Status: DISCONTINUED | OUTPATIENT
Start: 2025-07-11 | End: 2025-07-11 | Stop reason: HOSPADM

## 2025-07-11 RX ORDER — ROPIVACAINE HYDROCHLORIDE 5 MG/ML
INJECTION, SOLUTION EPIDURAL; INFILTRATION; PERINEURAL
Status: DISCONTINUED | OUTPATIENT
Start: 2025-07-11 | End: 2025-07-11 | Stop reason: SDUPTHER

## 2025-07-11 RX ORDER — SODIUM CHLORIDE 0.9 % (FLUSH) 0.9 %
5-40 SYRINGE (ML) INJECTION PRN
Status: DISCONTINUED | OUTPATIENT
Start: 2025-07-11 | End: 2025-07-11 | Stop reason: HOSPADM

## 2025-07-11 RX ORDER — SODIUM CHLORIDE 0.9 % (FLUSH) 0.9 %
5-40 SYRINGE (ML) INJECTION EVERY 12 HOURS SCHEDULED
Status: DISCONTINUED | OUTPATIENT
Start: 2025-07-11 | End: 2025-07-11 | Stop reason: HOSPADM

## 2025-07-11 RX ORDER — LIDOCAINE HYDROCHLORIDE 20 MG/ML
INJECTION, SOLUTION INTRAVENOUS
Status: DISCONTINUED | OUTPATIENT
Start: 2025-07-11 | End: 2025-07-11 | Stop reason: SDUPTHER

## 2025-07-11 RX ORDER — DEXAMETHASONE SODIUM PHOSPHATE 4 MG/ML
INJECTION, SOLUTION INTRA-ARTICULAR; INTRALESIONAL; INTRAMUSCULAR; INTRAVENOUS; SOFT TISSUE
Status: DISCONTINUED | OUTPATIENT
Start: 2025-07-11 | End: 2025-07-11 | Stop reason: SDUPTHER

## 2025-07-11 RX ORDER — BACITRACIN ZINC AND POLYMYXIN B SULFATE 500; 1000 [USP'U]/G; [USP'U]/G
OINTMENT TOPICAL PRN
Status: DISCONTINUED | OUTPATIENT
Start: 2025-07-11 | End: 2025-07-11 | Stop reason: ALTCHOICE

## 2025-07-11 RX ORDER — SODIUM CHLORIDE 9 MG/ML
INJECTION, SOLUTION INTRAVENOUS CONTINUOUS PRN
Status: DISCONTINUED | OUTPATIENT
Start: 2025-07-11 | End: 2025-07-11 | Stop reason: HOSPADM

## 2025-07-11 RX ORDER — HYDROCODONE BITARTRATE AND ACETAMINOPHEN 5; 325 MG/1; MG/1
1 TABLET ORAL EVERY 6 HOURS PRN
Qty: 20 TABLET | Refills: 0 | Status: SHIPPED | OUTPATIENT
Start: 2025-07-11 | End: 2025-07-16

## 2025-07-11 RX ADMIN — Medication 100 MCG: at 08:02

## 2025-07-11 RX ADMIN — WATER 2000 MG: 1 INJECTION INTRAMUSCULAR; INTRAVENOUS; SUBCUTANEOUS at 07:48

## 2025-07-11 RX ADMIN — FENTANYL CITRATE 50 MCG: 50 INJECTION, SOLUTION INTRAMUSCULAR; INTRAVENOUS at 07:57

## 2025-07-11 RX ADMIN — MIDAZOLAM HYDROCHLORIDE 2 MG: 1 INJECTION, SOLUTION INTRAMUSCULAR; INTRAVENOUS at 06:51

## 2025-07-11 RX ADMIN — FENTANYL CITRATE 100 MCG: 50 INJECTION, SOLUTION INTRAMUSCULAR; INTRAVENOUS at 06:51

## 2025-07-11 RX ADMIN — EPHEDRINE SULFATE 10 MG: 50 INJECTION INTRAVENOUS at 08:26

## 2025-07-11 RX ADMIN — EPHEDRINE SULFATE 5 MG: 50 INJECTION INTRAVENOUS at 08:24

## 2025-07-11 RX ADMIN — ROPIVACAINE HYDROCHLORIDE 30 ML: 5 INJECTION, SOLUTION EPIDURAL; INFILTRATION; PERINEURAL at 06:56

## 2025-07-11 RX ADMIN — DEXAMETHASONE SODIUM PHOSPHATE 4 MG: 4 INJECTION INTRA-ARTICULAR; INTRALESIONAL; INTRAMUSCULAR; INTRAVENOUS; SOFT TISSUE at 07:42

## 2025-07-11 RX ADMIN — LIDOCAINE HYDROCHLORIDE 100 MG: 20 INJECTION, SOLUTION INTRAVENOUS at 07:40

## 2025-07-11 RX ADMIN — Medication 100 MCG: at 08:11

## 2025-07-11 RX ADMIN — GLYCOPYRROLATE 0.2 MG: 0.2 INJECTION INTRAMUSCULAR; INTRAVENOUS at 07:54

## 2025-07-11 RX ADMIN — PROPOFOL 160 MG: 10 INJECTION, EMULSION INTRAVENOUS at 07:40

## 2025-07-11 RX ADMIN — Medication 100 MCG: at 08:17

## 2025-07-11 RX ADMIN — EPHEDRINE SULFATE 5 MG: 50 INJECTION INTRAVENOUS at 08:40

## 2025-07-11 RX ADMIN — SODIUM CHLORIDE, SODIUM LACTATE, POTASSIUM CHLORIDE, AND CALCIUM CHLORIDE: .6; .31; .03; .02 INJECTION, SOLUTION INTRAVENOUS at 06:43

## 2025-07-11 RX ADMIN — ONDANSETRON 4 MG: 2 INJECTION, SOLUTION INTRAMUSCULAR; INTRAVENOUS at 07:42

## 2025-07-11 RX ADMIN — FENTANYL CITRATE 50 MCG: 50 INJECTION, SOLUTION INTRAMUSCULAR; INTRAVENOUS at 08:30

## 2025-07-11 ASSESSMENT — PAIN SCALES - GENERAL
PAINLEVEL_OUTOF10: 0
PAINLEVEL_OUTOF10: 0

## 2025-07-11 ASSESSMENT — PAIN - FUNCTIONAL ASSESSMENT: PAIN_FUNCTIONAL_ASSESSMENT: NONE - DENIES PAIN

## 2025-07-11 NOTE — DISCHARGE INSTRUCTIONS
[facility]   Post op Instructions for Hand and Upper Extremity Surgery    * Keep dressing dry and clean. It is ok to Shower just keep Dressing dry.  * Elevate operative arm.  * Ice 20 minutes on 20 minutes off.  * Follow-up appointment as scheduled by office staff. Check paperwork from office.    If no appointment scheduled call the office.  * If dressing is too tight, you may loosen Ace bandage and leave splint in place.  * Sling, as needed  * If you have any questions, refer to the office number listed below.    [phone]  ([place] Administrative Office)      [facility] AMBULATORY PROCEDURE DISCHARGE INSTRUCTIONS      Date: 25   Patient: Radha Ruelas,  Age: 55 y.o., (:1970)     Procedure(s):  LEFT WRIST ARTHROSCOPY, TRIANGULAR FIBROCARTILAGE COMPLEX DEBRIDEMENT     There are potential side effects of anesthesia or sedation you may experience for the first 24 hours.  These side effects include:    Confusion or Memory loss, Dizziness, or Delayed Reaction Times   [x]A responsible person should be with you for the next 24 hours.  Do not operate any vehicles (automobiles, bicycles, motorcycles) or power tools or machinery for 24 hours.  Do not sign any legal documents or make any legal decisions for 24 hours. Do not drink alcohol for 24 hours or while taking narcotic pain medication.      Nausea    [x]Start with light diet and progress to your normal diet as you feel like eating. However, if you experience nausea or repeated episodes of vomiting which persist beyond 12-24 hours, notify your physician.  Once nausea has passed, remember to keep drinking fluids.    Difficulty Passing Urine  [x]Drink extra amounts of fluid today.  Notify your physician if you have not urinated within 8 hours after your procedure or you feel uncomfortable.      Irritated Throat from a Breathing Tube  [x]Drink extra amounts of fluid today.  Lozenges may help.    Muscle Aches  [x]You  exactly as directed.   Follow the directions carefully. It's easy to misuse opioids if you take a dose other than what's prescribed by your doctor. Even sharing them with someone they were not meant for is misuse.  Do not drive or operate machinery.   Opioids may affect your judgment and decision making. Talk with your doctor about when it is safe to drive.  Avoid alcohol, sleeping medicines, and muscle relaxers.   Opioids can be dangerous if you take them with alcohol or with certain drugs like sleeping pills and muscle relaxers. The combination can decrease your breathing rate and lead to overdose or death. Make sure your doctor knows about all the other medicines you take, including over-the-counter medicines. Don't start any new medicines before you talk to your doctor or pharmacist.  Ask your doctor about a naloxone rescue kit.   It can help you--and even save your life--if you take too much of an opioid.    If you have a CPAP machine, it is very important that you use it daily during all periods of sleep and daytime rest during your recovery at home.  Surgery and Anesthesia place a significant amount of stress on your body.  Using your CPAP will help keep you safe and lessen the negative effects of that stress.      CALL YOUR PHYSICIAN FOR:  Watch for these possible complications, symptoms, or side effects of anesthesia.  Call physician if they or any other problems occur:    Signs of INFECTION   > Fever over 101°     > Redness, swelling, hardness or warmth at the operative site   >Foul smelling or cloudy drainage at the operative site   Unrelieved PAIN  Unrelieved NAUSEA  Blood soaked dressing.  (Some oozing may be normal)  Inability to urinate      Numb, pale, blue, cold or tingling extremity        If you smoke STOP. We care about your health!    POST OP NERVE BLOCK CARE    You had a nerve block in your left arm for pain control by the Anestheologist.  This block will last an average of 8-24 hours after

## 2025-07-11 NOTE — ANESTHESIA PROCEDURE NOTES
Peripheral Block    Patient location during procedure: pre-op  Reason for block: post-op pain management and at surgeon's request  Start time: 7/11/2025 6:51 AM  End time: 7/11/2025 6:58 AM  Staffing  Performed: anesthesiologist   Anesthesiologist: Sam Stratton MD  Performed by: Sam Stratton MD  Authorized by: Sam Stratton MD    Preanesthetic Checklist  Completed: patient identified, IV checked, site marked, risks and benefits discussed, surgical/procedural consents, equipment checked, pre-op evaluation, timeout performed, anesthesia consent given, oxygen available and monitors applied/VS acknowledged  Peripheral Block   Patient position: supine  Prep: ChloraPrep  Provider prep: mask and sterile gloves  Patient monitoring: cardiac monitor, continuous pulse ox, frequent blood pressure checks, IV access, responsive to questions, oxygen and continuous capnometry  Block type: Brachial plexus and Axillary  Laterality: left  Injection technique: single-shot  Guidance: ultrasound guided    Needle   Needle type: insulated echogenic nerve stimulator needle   Needle gauge: 22 G  Needle localization: ultrasound guidance  Needle length: 8 cm  Assessment   Injection assessment: negative aspiration for heme, no paresthesia on injection, local visualized surrounding nerve on ultrasound and no intravascular symptoms  Paresthesia pain: none  Slow fractionated injection: yes  Hemodynamics: stable  Outcomes: uncomplicated and patient tolerated procedure well    Additional Notes  Immediately prior to procedure a \"time out\" was called to verify the correct patient, allergies, laterality, procedure and equipment. Time out performed with RN at 0650.    Local Anesthetic: See below    Biceps, Triceps and Coracobrachialis muscles, Axillary artery and Axillary vein, Radial, ulnar and median nerve are identified; the tip of the needle and the spread of the local anesthetic around the Radial, ulnar and median nerves appeared to

## 2025-07-11 NOTE — ANESTHESIA POSTPROCEDURE EVALUATION
Department of Anesthesiology  Postprocedure Note    Patient: Radha Ruelas  MRN: [id number]  YOB: 1970  Date of evaluation: 7/11/2025    Procedure Summary       Date: 07/11/25 Room / Location: [place] [address] / [facility]    Anesthesia Start: 0736 Anesthesia Stop: 0905    Procedure: LEFT WRIST ARTHROSCOPY, TRIANGULAR FIBROCARTILAGE COMPLEX DEBRIDEMENT (Left: Wrist) Diagnosis:       Triangular fibrocartilage complex injury, left, initial encounter      (Triangular fibrocartilage complex injury, left, initial encounter [S69.82XA])    Surgeons: Bonifacio Cole MD Responsible Provider: Sam Stratton MD    Anesthesia Type: general ASA Status: 3            Anesthesia Type: No value filed.    Nelson Phase I: Nelson Score: 10    Nelson Phase II: Nelson Score: 10    Anesthesia Post Evaluation    Patient location during evaluation: PACU  Patient participation: complete - patient participated  Level of consciousness: awake and alert  Pain score: 0  Airway patency: patent  Nausea & Vomiting: no nausea and no vomiting  Cardiovascular status: hemodynamically stable  Respiratory status: acceptable  Hydration status: euvolemic  Pain management: adequate    No notable events documented.

## 2025-07-11 NOTE — PROGRESS NOTES
Ambulatory Surgery/Procedure Discharge Note    Vitals:    07/11/25 0930   BP: 128/75   Pulse: 75   Resp: 14   Temp: 97.2 °F (36.2 °C)   SpO2: 95%   Pt meets discharge criteria per Nelson score.     In: 1410 [P.O.:360; I.V.:1050]  Out: -     Restroom use offered before discharge.  Yes    Pain assessment:  none  Pain Level: 0    Pt and S.O./family states \"ready to go home\". Pt alert and oriented x4. IV removed. Denies N/V or pain. Dressing to left arm c/d/I. Sling and ice pack in place.  Pt tolerating po intake. Discharge instructions given to pt and  with pt permission. Pt and  verbalized understanding of all instructions. Left with all belongings, 1 prescriptions, and discharge instructions.       Patient discharged to home/self care. Patient discharged via wheel chair by transporter to waiting family/S.O.       7/11/2025 10:03 AM

## 2025-07-11 NOTE — FLOWSHEET NOTE
PACU Transfer to [facility]    Pt meets criteria to transfer to next phase of care per SOHAM SCORE and ASPAN standards    Pain upon d/c from PACU: controlled / tolerable  Soham Phase I: Soham Score: 10  Post-op Nausea & Vomiting: no nausea and no vomiting  Last Vitals:   Vitals:    07/11/25 0920   BP: 133/75   Pulse: 81   Resp: 13   Temp: 97.8 °F (36.6 °C)   SpO2: 92%       In: 1410 [P.O.:360; I.V.:1050]  Out: -       Pt taken to [facility] #6 by Attila  Family/identified trustworthy individual(s) updated on POC and change in pt's location.

## 2025-07-11 NOTE — H&P
I have reviewed the history and physical and examined the patient and find no relevant changes.  Patient with prior history of left axillary lymph node dissection. Discussed risks of lymphedema and potential unique complications with surgery, including use of tourniquet. The patient is understanding of risks and will proceed with use of tourniquet  I have reviewed with the patient and/or family the risks, benefits, and alternatives to the procedure.    Bonifacio Cole MD  7/11/2025

## 2025-07-11 NOTE — FLOWSHEET NOTE
ADMISSION TO PACU     Patient: Radha DUMONT Ruelas    Procedure(s):  LEFT WRIST ARTHROSCOPY, TRIANGULAR FIBROCARTILAGE COMPLEX DEBRIDEMENT    Level of consciousness: drowsy but awake  Nursing Assessment: dressing to LUE. Fingers warm, good cap refill. Able to minimally wiggle fingers. Denies tingling. NICOLASA radial pulse d/t dressing  - Numbness/decreased sensation and a \"heavy\" feeling in Lue dt preop block  - PIV in RAC infiltrated pre op and bandaid remains in place. Site remains edematous but color is WNL and is warm. +2 radial pulse.     Nelson PACU #1: Nelson Score: 9  1st PACU Vitals:   Vitals:    07/11/25 0904   BP: 133/75   Pulse: 82   Resp: 27   Temp: 97.4 °F (36.3 °C)   SpO2: 92%     Pain upon arrival to PACU: denies  No results for input(s): \"POCGLU\" in the last 72 hours.      Intake/Output Summary (Last 24 hours) at 7/11/2025 0911  Last data filed at 7/11/2025 0856  Gross per 24 hour   Intake 700 ml   Output --   Net 700 ml

## 2025-07-11 NOTE — ADDENDUM NOTE
Addendum  created 07/11/25 1420 by Sam Stratton MD    Child order released for a procedure order, Clinical Note Signed, Intraprocedure Blocks edited, Intraprocedure Meds edited, SmartForm saved

## 2025-07-11 NOTE — ANESTHESIA PRE PROCEDURE
Department of Anesthesiology  Preprocedure Note       Name:  Radha Ruelas   Age:  55 y.o.  :  1970                                          MRN:  [id number]         Date:  2025      Surgeon: Surgeon(s):  Bonifacio Cole MD    Procedure: Procedure(s):  LEFT WRIST ARTHROSCOPY, POSSIBLE TRIANGULAR FIBROCARTILAGE COMPLEX DEBRIDEMENT VERSUS REPAIR, POSSIBLE ULNAR WAFER PROCEDURE    Medications prior to admission:   Prior to Admission medications    Medication Sig Start Date End Date Taking? Authorizing Provider   lisinopril-hydroCHLOROthiazide (PRINZIDE;ZESTORETIC) 10-12.5 MG per tablet Take 1 tablet by mouth daily 25  Yes Dakotah Sanchez MD   Multiple Vitamins-Minerals (THERAPEUTIC MULTIVITAMIN-MINERALS) tablet Take 1 tablet by mouth daily   Yes Provider, MD Dionicio   vitamin D (CHOLECALCIFEROL) 50 MCG (2000) TABS tablet TAKE 1 TABLET BY MOUTH ONE TIME A DAY 4/3/25  Yes Dakotah Sanchez MD   exemestane (AROMASIN) 25 MG tablet Take 1 tablet by mouth daily   Yes Provider, MD Dionicio   naproxen (NAPROSYN) 250 MG tablet Take 1 tablet by mouth daily as needed for Pain   Yes Provider, MD Dionicio   rosuvastatin (CRESTOR) 5 MG tablet Take 1 tablet by mouth nightly 7/10/25   Joleen Sterling APRN - CNP   Tirzepatide-Weight Management (ZEPBOUND) 15 MG/0.5ML SOAJ Inject 15 mg into the skin once a week 10/1/24   Joleen Sterling APRN - CNP       Current medications:    Current Facility-Administered Medications   Medication Dose Route Frequency Provider Last Rate Last Admin   • ceFAZolin (ANCEF) 2,000 mg in sterile water 20 mL IV syringe  2,000 mg IntraVENous Once Bonifacio Cole MD       • lidocaine PF 1 % injection 1 mL  1 mL IntraDERmal Once PRN Sam Stratton MD       • lactated ringers infusion   IntraVENous Continuous Sam Stratton MD       • sodium chloride flush 0.9 % injection 5-40 mL  5-40 mL IntraVENous 2 times per day Sam Stratton MD       • sodium chloride

## 2025-07-11 NOTE — OP NOTE
[facility]  Procedure Note  [facility] Addis Ruelas  : 1970    DOS:   2025    Pre-operative Diagnosis:left Wrist TFC Tear    Post-operative Diagnosis: same + central TFCC tear and partial scapholunate ligament tear    Procedure: left Wrist Arthroscopy with TFC debridement of central TFCC tear and debridement of scapholunate ligament partial tear    Surgeon: Bonifacio Cole MD    Assistant: Mercy SA     Anesthesia:  General, regional    Tourniquet time: 43 min  Estimated blood loss:  5ml    Complications:  None immediate apparent     INDICATIONS FOR PROCEDURE: The patient has clinical evidence of symptomatic pain in the ulnar wrist and has failed appropriate conservative management.  Clinical and imaging findings suggest a likely TFC tear as well as partial scapholunate ligament tear . The patient understands the relevant risks, benefits, limitations, chance of recurrence and healing process of the procedure.     PROCEDURE: The patient was brought to the operating room and anesthetized with general anesthesia.  The identified and marked extremity was then prepped and draped in a standard fashion.  A well-padded tourniquet was applied to the upper arm.  The arm was gently exsanguinated and the tourniquet elevated to 250 mmHg.     The arm was then placed in the traction tower apparatus and 8 pounds of traction applied to to the index and long fingers. The bony and tendinous landmarks were drawn and the radiocarpal joint injected with sterile lactated ringers solution. The 3-4 portal was then made and used to visualize the radiocarpal joint. Articular surfaces and the scapholunate ligament were visualized and documented with printed images.     Attention was then directed to the ulnar side of the wrist. A 6-R portal was made and a full-radius shaver introduced to provide better visualization. Findings included fraying and tearing TFCC with central tear.

## 2025-08-05 ENCOUNTER — HOSPITAL ENCOUNTER (OUTPATIENT)
Dept: WOMENS IMAGING | Age: 55
Discharge: HOME | End: 2025-08-05
Payer: COMMERCIAL

## 2025-08-05 VITALS — BODY MASS INDEX: 33.21 KG/M2 | HEIGHT: 70 IN | WEIGHT: 232 LBS

## 2025-08-05 DIAGNOSIS — Z12.31 VISIT FOR SCREENING MAMMOGRAM: ICD-10-CM

## 2025-08-05 PROCEDURE — 77063 BREAST TOMOSYNTHESIS BI: CPT

## (undated) DEVICE — COVER TRNSDUC W6XL96IN POLY TELESCOPICALLY FLD W/ ATTCH FRM

## (undated) DEVICE — BANDAGE COMPR W2INXL5YD TAN BRTH SELF ADH WRP W/ HND TEAR

## (undated) DEVICE — BLADE SHV L7CM DIA3MM SM HUB DISECT AGG COOLCUT

## (undated) DEVICE — APPLICATOR MEDICATED 26 CC SOLUTION HI LT ORNG CHLORAPREP

## (undated) DEVICE — TUBING PMP L6FT CONT WAVE EXTN

## (undated) DEVICE — GLOVE ORANGE PI 7   MSG9070

## (undated) DEVICE — BLADE,CARBON-STEEL,11,STRL,DISPOSABLE,TB: Brand: MEDLINE

## (undated) DEVICE — GARMENT,MEDLINE,DVT,INT,CALF,MED, GEN2: Brand: MEDLINE

## (undated) DEVICE — SPONGE GZ W4XL4IN COT 12 PLY TYP VII WVN C FLD DSGN STERILE

## (undated) DEVICE — MAJOR SET UP: Brand: MEDLINE INDUSTRIES, INC.

## (undated) DEVICE — PADDING CAST W4INXL4YD HIGHLY ABSRB THAN COT EZ APPL

## (undated) DEVICE — COUNTER NDL 40 COUNT HLD 70 NUM FOAM BLK SGL MAG W BLDE REMV

## (undated) DEVICE — UNDERGLOVE SURG SZ 8 FNGR THK0.21MIL GRN LTX BEAD CUF

## (undated) DEVICE — SUTURE N ABSRB L 18 IN SZ 4-0 NDL L 19 MM NYL MONOFILAMENT

## (undated) DEVICE — MINOR SET UP PK

## (undated) DEVICE — DRAPE,T,LAPARO,TRANS,STERILE: Brand: MEDLINE

## (undated) DEVICE — BANDAGE COMPR W4INXL15FT BGE E SGL LAYERED CLP CLSR

## (undated) DEVICE — SYRINGE MED 20ML STD CLR PLAS LUERLOCK TIP N CTRL DISP

## (undated) DEVICE — SKIN MARKER REGULAR TIP WITH RULER CAP AND LABELS: Brand: DEVON

## (undated) DEVICE — SYRINGE MED 10ML LUERLOCK TIP W/O SFTY DISP

## (undated) DEVICE — ZIMMER® STERILE DISPOSABLE TOURNIQUET CUFF WITH PLC, DUAL PORT, SINGLE BLADDER, 18 IN. (46 CM)

## (undated) DEVICE — SPONGE GZ W4XL4IN COT 12 PLY TYP VII WVN C FLD DSGN

## (undated) DEVICE — PROBE SET W/ DRP

## (undated) DEVICE — ADHESIVE SKIN CLOSURE TOP 36 CC HI VISC DERMBND MINI

## (undated) DEVICE — GOWN,SIRUS,POLYRNF,BRTHSLV,XL,30/CS: Brand: MEDLINE

## (undated) DEVICE — SOLUTION IRRIG 1000ML 0.9% SOD CHL USP POUR PLAS BTL

## (undated) DEVICE — ATTACHMENT SMK EVAC FOR ES PNCL ACCUVAC

## (undated) DEVICE — TUBING, SUCTION, 1/4" X 12', STRAIGHT: Brand: MEDLINE

## (undated) DEVICE — SPONGE LAP W18XL18IN WHT COT 4 PLY FLD STRUNG RADPQ DISP ST 2 PER PACK

## (undated) DEVICE — SOLUTION IV IRRIG POUR BRL 0.9% SODIUM CHL 2F7124

## (undated) DEVICE — BANDAGE COBAN 4 IN COMPR W4INXL5YD FOAM COHESIVE QUIK STK SELF ADH SFT

## (undated) DEVICE — DIGIT TRAP FINGER GRASPING DEVICE: Brand: DIGIT TRAP

## (undated) DEVICE — INTENDED FOR TISSUE SEPARATION, AND OTHER PROCEDURES THAT REQUIRE A SHARP SURGICAL BLADE TO PUNCTURE OR CUT.: Brand: BARD-PARKER ® STAINLESS STEEL BLADES

## (undated) DEVICE — 3M™ IOBAN™ 2 ANTIMICROBIAL INCISE DRAPE 6640EZ: Brand: IOBAN™ 2

## (undated) DEVICE — BLADE,CARBON-STEEL,15,STRL,DISPOSABLE,TB: Brand: MEDLINE

## (undated) DEVICE — PADDING UNDERCAST W4INXL4YD 100% COT CRIMPED FINISH WBRL II

## (undated) DEVICE — SUTURE VCRL SZ 4-0 L18IN ABSRB UD L19MM PS-2 3/8 CIR PRIM J496H

## (undated) DEVICE — BLADE ES ELASTOMERIC COAT INSUL DURABLE BEND UPTO 90DEG

## (undated) DEVICE — BLADE SHV L7CM DIA3MM SABRE SM HUB COOLCUT

## (undated) DEVICE — SOLUTION IV IRRIG 250ML ST LF 0.9% SODIUM 2F7122

## (undated) DEVICE — WRAP COHESIVE W2INXL5YD TAN SELF ADH BNDG HND NON STERILE TEAR CARING

## (undated) DEVICE — UNDERGLOVE SURG SZ 8 BLU LTX FREE SYN POLYISOPRENE POLYMER

## (undated) DEVICE — LIQUIBAND RAPID ADHESIVE 36/CS 0.8ML: Brand: MEDLINE

## (undated) DEVICE — DRAPE HND W114XL142IN BLU POLYPR W O PCH FEN CRD AND TB HLDR

## (undated) DEVICE — GLOVE SURG SZ 75 CRM LTX FREE POLYISOPRENE POLYMER BEAD ANTI

## (undated) DEVICE — SHEET,DRAPE,53X77,STERILE: Brand: MEDLINE

## (undated) DEVICE — GAUZE FLUFF 2 PLY: Brand: DEROYAL

## (undated) DEVICE — SNARE ENDOSCP W10MMXL240CM SHTH DIA24MM RND INSUL STIFF

## (undated) DEVICE — DRESSING PETRO W3XL3IN OIL EMUL N ADH GZ KNIT IMPREG CELOS

## (undated) DEVICE — GOWN SIRUS NONREIN XL W/TWL: Brand: MEDLINE INDUSTRIES, INC.

## (undated) DEVICE — COVER LT HNDL BLU PLAS

## (undated) DEVICE — COVER,TABLE,HEAVY DUTY,77"X90",STRL: Brand: MEDLINE

## (undated) DEVICE — SHEARS ENDOSCP L9CM CRV HARM FOCS +

## (undated) DEVICE — SPECIMEN ORIENTATION CHARMS, SIX DISTINCTLY SHAPED STERILE 10MM CHARMS: Brand: MARGINMAP

## (undated) DEVICE — SKIN AFFIX SURG ADHESIVE 72/CS 0.55ML: Brand: MEDLINE

## (undated) DEVICE — SYRINGE,EAR/ULCER, 2 OZ, STERILE: Brand: MEDLINE

## (undated) DEVICE — ADHESIVE SKIN CLSR 0.7ML TOP DERMBND ADV

## (undated) DEVICE — TUBING PMP L16FT MAIN DISP FOR AR-6400 AR-6475 Â€“ ORDER MULTIPLES OF 10 EACH

## (undated) DEVICE — GLOVE ORANGE PI 7 1/2   MSG9075

## (undated) DEVICE — GLOVE SURG SZ 65 CRM LTX FREE POLYISOPRENE POLYMER BEAD ANTI

## (undated) DEVICE — ELECTRODE PT RET AD L9FT HI MOIST COND ADH HYDRGEL CORDED

## (undated) DEVICE — HAND: Brand: MEDLINE INDUSTRIES, INC.

## (undated) DEVICE — GLOVE ORTHO 7 1/2   MSG9475

## (undated) DEVICE — SUTURE MCRYL + SZ 4-0 L18IN ABSRB UD L19MM PS-2 3/8 CIR MCP496G

## (undated) DEVICE — SLING ARM L L17 1/2IN D8.5IN COT POLY DLX W/ SHLDR PD

## (undated) DEVICE — MINOR SET UP: Brand: MEDLINE INDUSTRIES, INC.

## (undated) DEVICE — GAUZE,SPONGE,4"X4",16PLY,XRAY,STRL,LF: Brand: MEDLINE

## (undated) DEVICE — SUTURE VCRL + SZ 3-0 L27IN ABSRB UD L26MM SH 1/2 CIR VCP416H

## (undated) DEVICE — BANDAGE COMPR M W4INXL10YD WHT BGE VELC E MTRX HK AND LOOP

## (undated) DEVICE — PENCIL ES L3M BTTN SWCH S STL HEX LOK BLDE ELECTRD HOLSTER

## (undated) DEVICE — BANDAGE COMPR W4INXL12FT E DISP ESMARCH EVEN

## (undated) DEVICE — COVER US PRB W13XL244CM SURGICAL INTRAOPERATIVE W RUBBERBAND

## (undated) DEVICE — GLOVE SURG SZ 8 L12IN FNGR THK79MIL GRN LTX FREE

## (undated) DEVICE — BLADE SHV L7CM DIA2MM SABRE SM HUB COOLCUT

## (undated) DEVICE — Device

## (undated) DEVICE — TOWEL,STOP FLAG GOLD N-W: Brand: MEDLINE

## (undated) DEVICE — SUTURE CHROMIC GUT SZ 4-0 L27IN ABSRB BRN FS-2 L19MM 3/8 635H

## (undated) DEVICE — STRIP,CLOSURE,WOUND,MEDI-STRIP,1/2X4: Brand: MEDLINE

## (undated) DEVICE — MERCY HEALTH WEST TURNOVER: Brand: MEDLINE INDUSTRIES, INC.

## (undated) DEVICE — CANISTER, RIGID, 1200CC: Brand: MEDLINE INDUSTRIES, INC.

## (undated) DEVICE — SYRINGE IRRIG 60ML SFT PLIABLE BLB EZ TO GRP 1 HND USE W/

## (undated) DEVICE — SUTURE VCRL SZ 3-0 L27IN ABSRB UD L26MM SH 1/2 CIR J416H